# Patient Record
Sex: FEMALE | Race: WHITE | NOT HISPANIC OR LATINO | Employment: OTHER | URBAN - NONMETROPOLITAN AREA
[De-identification: names, ages, dates, MRNs, and addresses within clinical notes are randomized per-mention and may not be internally consistent; named-entity substitution may affect disease eponyms.]

---

## 2018-10-17 ENCOUNTER — TRANSCRIBE ORDERS (OUTPATIENT)
Dept: ADMINISTRATIVE | Facility: HOSPITAL | Age: 53
End: 2018-10-17

## 2018-10-17 ENCOUNTER — LAB REQUISITION (OUTPATIENT)
Dept: LAB | Facility: HOSPITAL | Age: 53
End: 2018-10-17

## 2018-10-17 ENCOUNTER — APPOINTMENT (OUTPATIENT)
Dept: LAB | Facility: HOSPITAL | Age: 53
End: 2018-10-17
Attending: OBSTETRICS & GYNECOLOGY

## 2018-10-17 DIAGNOSIS — Z00.00 ENCOUNTER FOR GENERAL ADULT MEDICAL EXAMINATION WITHOUT ABNORMAL FINDINGS: ICD-10-CM

## 2018-10-17 DIAGNOSIS — Z78.0 POST-MENOPAUSAL: Primary | ICD-10-CM

## 2018-10-17 DIAGNOSIS — N83.209 CYST OF OVARY, UNSPECIFIED LATERALITY: ICD-10-CM

## 2018-10-17 LAB
ESTRADIOL SERPL HS-MCNC: 63.3 PG/ML
FSH SERPL-ACNC: 2.5 MIU/ML
LH SERPL-ACNC: 1.26 MIU/ML

## 2018-10-17 PROCEDURE — 81500 ONCO (OVAR) TWO PROTEINS: CPT | Performed by: OBSTETRICS & GYNECOLOGY

## 2018-10-17 PROCEDURE — 36415 COLL VENOUS BLD VENIPUNCTURE: CPT

## 2018-10-17 PROCEDURE — 82670 ASSAY OF TOTAL ESTRADIOL: CPT | Performed by: OBSTETRICS & GYNECOLOGY

## 2018-10-17 PROCEDURE — 83002 ASSAY OF GONADOTROPIN (LH): CPT | Performed by: OBSTETRICS & GYNECOLOGY

## 2018-10-17 PROCEDURE — 86304 IMMUNOASSAY TUMOR CA 125: CPT | Performed by: OBSTETRICS & GYNECOLOGY

## 2018-10-17 PROCEDURE — 88305 TISSUE EXAM BY PATHOLOGIST: CPT | Performed by: OBSTETRICS & GYNECOLOGY

## 2018-10-17 PROCEDURE — 83001 ASSAY OF GONADOTROPIN (FSH): CPT | Performed by: OBSTETRICS & GYNECOLOGY

## 2018-10-17 PROCEDURE — 86305 HUMAN EPIDIDYMIS PROTEIN 4: CPT | Performed by: OBSTETRICS & GYNECOLOGY

## 2018-10-17 PROCEDURE — 86301 IMMUNOASSAY TUMOR CA 19-9: CPT | Performed by: OBSTETRICS & GYNECOLOGY

## 2018-10-18 LAB
CANCER AG125 SERPL-ACNC: 18.4 U/ML (ref 0–38.1)
CANCER AG19-9 SERPL-ACNC: 28 U/ML (ref 0–35)
CONV COMMENT: ABNORMAL
HE4 SERPL-SCNC: 62.5 PMOL/L (ref 0–105.2)
POSTMENOPAUSAL INTERP: LOW: NORMAL
POSTMENOPAUSAL ROMA: 1.6
PREMENOPAUSAL INTERP: HIGH: NORMAL
PREMENOPAUSAL ROMA: 1.22

## 2018-10-19 LAB
CYTO UR: NORMAL
LAB AP CASE REPORT: NORMAL
LAB AP CLINICAL INFORMATION: NORMAL
PATH REPORT.FINAL DX SPEC: NORMAL
PATH REPORT.GROSS SPEC: NORMAL

## 2018-10-22 ENCOUNTER — TRANSCRIBE ORDERS (OUTPATIENT)
Dept: ADMINISTRATIVE | Facility: HOSPITAL | Age: 53
End: 2018-10-22

## 2018-10-22 DIAGNOSIS — C56.2 MALIGNANT NEOPLASM OF LEFT OVARY (HCC): Primary | ICD-10-CM

## 2018-10-24 ENCOUNTER — HOSPITAL ENCOUNTER (OUTPATIENT)
Dept: ULTRASOUND IMAGING | Facility: HOSPITAL | Age: 53
Discharge: HOME | End: 2018-10-24
Attending: OBSTETRICS & GYNECOLOGY | Admitting: OBSTETRICS & GYNECOLOGY

## 2018-10-24 DIAGNOSIS — C56.2 MALIGNANT NEOPLASM OF LEFT OVARY (HCC): ICD-10-CM

## 2018-10-24 PROCEDURE — 76830 TRANSVAGINAL US NON-OB: CPT

## 2019-06-23 ENCOUNTER — HOSPITAL ENCOUNTER (OUTPATIENT)
Facility: HOSPITAL | Age: 54
Setting detail: OBSERVATION
Discharge: HOME | End: 2019-06-24
Attending: INTERNAL MEDICINE | Admitting: INTERNAL MEDICINE

## 2019-06-23 ENCOUNTER — APPOINTMENT (OUTPATIENT)
Dept: GENERAL RADIOLOGY | Facility: HOSPITAL | Age: 54
End: 2019-06-23

## 2019-06-23 DIAGNOSIS — R07.9 CHEST PAIN, UNSPECIFIED TYPE: Primary | ICD-10-CM

## 2019-06-23 DIAGNOSIS — E87.6 HYPOKALEMIA: ICD-10-CM

## 2019-06-23 DIAGNOSIS — D72.829 LEUKOCYTOSIS, UNSPECIFIED TYPE: ICD-10-CM

## 2019-06-23 LAB
ALBUMIN SERPL-MCNC: 4.5 G/DL (ref 3.5–5)
ALBUMIN/GLOB SERPL: 1.3 G/DL (ref 1.1–2.5)
ALP SERPL-CCNC: 108 U/L (ref 24–120)
ALT SERPL W P-5'-P-CCNC: 28 U/L (ref 0–54)
ANION GAP SERPL CALCULATED.3IONS-SCNC: 14 MMOL/L (ref 4–13)
AST SERPL-CCNC: 30 U/L (ref 7–45)
BASOPHILS # BLD MANUAL: 0.1 10*3/MM3 (ref 0–0.2)
BASOPHILS NFR BLD AUTO: 0.8 % (ref 0–2)
BILIRUB SERPL-MCNC: 1 MG/DL (ref 0.1–1)
BUN BLD-MCNC: 9 MG/DL (ref 5–21)
BUN/CREAT SERPL: 13 (ref 7–25)
CALCIUM SPEC-SCNC: 9.7 MG/DL (ref 8.4–10.4)
CHLORIDE SERPL-SCNC: 98 MMOL/L (ref 98–110)
CO2 SERPL-SCNC: 30 MMOL/L (ref 24–31)
CREAT BLD-MCNC: 0.69 MG/DL (ref 0.5–1.4)
DEPRECATED RDW RBC AUTO: 40.3 FL (ref 40–54)
EOSINOPHIL # BLD MANUAL: 0.2 10*3/MM3 (ref 0–0.7)
EOSINOPHIL NFR BLD MANUAL: 1.6 % (ref 0–4)
ERYTHROCYTE [DISTWIDTH] IN BLOOD BY AUTOMATED COUNT: 13.3 % (ref 12–15)
GFR SERPL CREATININE-BSD FRML MDRD: 89 ML/MIN/1.73
GLOBULIN UR ELPH-MCNC: 3.5 GM/DL
GLUCOSE BLD-MCNC: 126 MG/DL (ref 70–100)
HCT VFR BLD AUTO: 37.8 % (ref 37–47)
HGB BLD-MCNC: 12.8 G/DL (ref 12–16)
HOLD SPECIMEN: NORMAL
HOLD SPECIMEN: NORMAL
LYMPHOCYTES # BLD MANUAL: 4.01 10*3/MM3 (ref 0.72–4.86)
LYMPHOCYTES NFR BLD MANUAL: 32.8 % (ref 15–45)
LYMPHOCYTES NFR BLD MANUAL: 6.4 % (ref 4–12)
MAGNESIUM SERPL-MCNC: 1.7 MG/DL (ref 1.4–2.2)
MCH RBC QN AUTO: 28.4 PG (ref 28–32)
MCHC RBC AUTO-ENTMCNC: 33.9 G/DL (ref 33–36)
MCV RBC AUTO: 84 FL (ref 82–98)
MONOCYTES # BLD AUTO: 0.78 10*3/MM3 (ref 0.19–1.3)
NEUTROPHILS # BLD AUTO: 6.95 10*3/MM3 (ref 1.87–8.4)
NEUTROPHILS NFR BLD MANUAL: 56.8 % (ref 39–78)
NT-PROBNP SERPL-MCNC: 17.4 PG/ML (ref 0–900)
PLAT MORPH BLD: NORMAL
PLATELET # BLD AUTO: 372 10*3/MM3 (ref 130–400)
PMV BLD AUTO: 10.5 FL (ref 6–12)
POTASSIUM BLD-SCNC: 3.4 MMOL/L (ref 3.5–5.3)
PROT SERPL-MCNC: 8 G/DL (ref 6.3–8.7)
RBC # BLD AUTO: 4.5 10*6/MM3 (ref 4.2–5.4)
RBC MORPH BLD: NORMAL
SODIUM BLD-SCNC: 142 MMOL/L (ref 135–145)
TROPONIN I SERPL-MCNC: <0.012 NG/ML (ref 0–0.03)
VARIANT LYMPHS NFR BLD MANUAL: 1.6 % (ref 0–5)
WBC MORPH BLD: NORMAL
WBC NRBC COR # BLD: 12.24 10*3/MM3 (ref 4.8–10.8)
WHOLE BLOOD HOLD SPECIMEN: NORMAL
WHOLE BLOOD HOLD SPECIMEN: NORMAL

## 2019-06-23 PROCEDURE — 84443 ASSAY THYROID STIM HORMONE: CPT | Performed by: INTERNAL MEDICINE

## 2019-06-23 PROCEDURE — 93005 ELECTROCARDIOGRAM TRACING: CPT | Performed by: EMERGENCY MEDICINE

## 2019-06-23 PROCEDURE — 93005 ELECTROCARDIOGRAM TRACING: CPT | Performed by: INTERNAL MEDICINE

## 2019-06-23 PROCEDURE — 85025 COMPLETE CBC W/AUTO DIFF WBC: CPT | Performed by: PHYSICIAN ASSISTANT

## 2019-06-23 PROCEDURE — 93010 ELECTROCARDIOGRAM REPORT: CPT | Performed by: INTERNAL MEDICINE

## 2019-06-23 PROCEDURE — G0378 HOSPITAL OBSERVATION PER HR: HCPCS

## 2019-06-23 PROCEDURE — 84484 ASSAY OF TROPONIN QUANT: CPT | Performed by: EMERGENCY MEDICINE

## 2019-06-23 PROCEDURE — 85007 BL SMEAR W/DIFF WBC COUNT: CPT | Performed by: PHYSICIAN ASSISTANT

## 2019-06-23 PROCEDURE — 83880 ASSAY OF NATRIURETIC PEPTIDE: CPT | Performed by: PHYSICIAN ASSISTANT

## 2019-06-23 PROCEDURE — 83735 ASSAY OF MAGNESIUM: CPT | Performed by: PHYSICIAN ASSISTANT

## 2019-06-23 PROCEDURE — 99284 EMERGENCY DEPT VISIT MOD MDM: CPT

## 2019-06-23 PROCEDURE — 71046 X-RAY EXAM CHEST 2 VIEWS: CPT

## 2019-06-23 PROCEDURE — 80053 COMPREHEN METABOLIC PANEL: CPT | Performed by: PHYSICIAN ASSISTANT

## 2019-06-23 PROCEDURE — 84439 ASSAY OF FREE THYROXINE: CPT | Performed by: INTERNAL MEDICINE

## 2019-06-23 RX ORDER — CARVEDILOL 3.12 MG/1
3.12 TABLET ORAL 2 TIMES DAILY WITH MEALS
COMMUNITY
End: 2023-01-11 | Stop reason: SDUPTHER

## 2019-06-23 RX ORDER — POTASSIUM CHLORIDE 750 MG/1
20 CAPSULE, EXTENDED RELEASE ORAL DAILY
Status: DISCONTINUED | OUTPATIENT
Start: 2019-06-23 | End: 2019-06-24 | Stop reason: HOSPADM

## 2019-06-23 RX ORDER — LEVOTHYROXINE SODIUM 175 UG/1
175 TABLET ORAL DAILY
COMMUNITY
End: 2022-09-30 | Stop reason: CLARIF

## 2019-06-23 RX ORDER — FUROSEMIDE 40 MG/1
40 TABLET ORAL DAILY
COMMUNITY

## 2019-06-23 RX ORDER — ACETAMINOPHEN 500 MG
1000 TABLET ORAL ONCE
Status: COMPLETED | OUTPATIENT
Start: 2019-06-23 | End: 2019-06-23

## 2019-06-23 RX ORDER — BUTALBITAL, ACETAMINOPHEN AND CAFFEINE 50; 325; 40 MG/1; MG/1; MG/1
1 TABLET ORAL EVERY 4 HOURS PRN
COMMUNITY
End: 2020-05-26

## 2019-06-23 RX ORDER — ALBUTEROL SULFATE 90 UG/1
2 AEROSOL, METERED RESPIRATORY (INHALATION) EVERY 4 HOURS PRN
COMMUNITY
End: 2023-01-09 | Stop reason: SDUPTHER

## 2019-06-23 RX ORDER — LISINOPRIL AND HYDROCHLOROTHIAZIDE 20; 12.5 MG/1; MG/1
1 TABLET ORAL DAILY
COMMUNITY
End: 2023-01-11 | Stop reason: SDUPTHER

## 2019-06-23 RX ORDER — ATORVASTATIN CALCIUM 20 MG/1
20 TABLET, FILM COATED ORAL DAILY
COMMUNITY
End: 2023-01-11 | Stop reason: SDUPTHER

## 2019-06-23 RX ORDER — TRAZODONE HYDROCHLORIDE 50 MG/1
50 TABLET ORAL NIGHTLY
COMMUNITY
End: 2020-05-26

## 2019-06-23 RX ORDER — CETIRIZINE HYDROCHLORIDE 10 MG/1
10 TABLET ORAL DAILY
COMMUNITY
End: 2022-09-30 | Stop reason: SDUPTHER

## 2019-06-23 RX ORDER — POTASSIUM CHLORIDE 750 MG/1
20 CAPSULE, EXTENDED RELEASE ORAL DAILY
Status: DISCONTINUED | OUTPATIENT
Start: 2019-06-24 | End: 2019-06-23 | Stop reason: SDUPTHER

## 2019-06-23 RX ADMIN — ACETAMINOPHEN 1000 MG: 500 TABLET, FILM COATED ORAL at 22:02

## 2019-06-23 RX ADMIN — POTASSIUM CHLORIDE 20 MEQ: 750 CAPSULE, EXTENDED RELEASE ORAL at 23:18

## 2019-06-24 ENCOUNTER — APPOINTMENT (OUTPATIENT)
Dept: CARDIOLOGY | Facility: HOSPITAL | Age: 54
End: 2019-06-24

## 2019-06-24 VITALS
TEMPERATURE: 97.4 F | OXYGEN SATURATION: 94 % | DIASTOLIC BLOOD PRESSURE: 62 MMHG | BODY MASS INDEX: 39.68 KG/M2 | HEIGHT: 72 IN | HEART RATE: 90 BPM | RESPIRATION RATE: 20 BRPM | WEIGHT: 293 LBS | SYSTOLIC BLOOD PRESSURE: 114 MMHG

## 2019-06-24 PROBLEM — E78.5 HYPERLIPIDEMIA: Chronic | Status: ACTIVE | Noted: 2019-06-24

## 2019-06-24 PROBLEM — I10 HYPERTENSION: Chronic | Status: ACTIVE | Noted: 2019-06-24

## 2019-06-24 PROBLEM — E66.01 OBESITY, CLASS III, BMI 40-49.9 (MORBID OBESITY): Status: ACTIVE | Noted: 2019-06-24

## 2019-06-24 PROBLEM — E11.9 DIABETES MELLITUS (HCC): Chronic | Status: ACTIVE | Noted: 2019-06-24

## 2019-06-24 PROBLEM — J44.9 COPD (CHRONIC OBSTRUCTIVE PULMONARY DISEASE): Chronic | Status: ACTIVE | Noted: 2019-06-24

## 2019-06-24 PROBLEM — G43.011 INTRACTABLE MIGRAINE WITHOUT AURA AND WITH STATUS MIGRAINOSUS: Chronic | Status: ACTIVE | Noted: 2019-06-24

## 2019-06-24 PROBLEM — G43.909 MIGRAINE: Status: ACTIVE | Noted: 2019-06-24

## 2019-06-24 LAB
ANION GAP SERPL CALCULATED.3IONS-SCNC: 11 MMOL/L (ref 4–13)
ARTICHOKE IGE QN: 98 MG/DL (ref 0–99)
BH CV STRESS BP STAGE 1: NORMAL
BH CV STRESS BP STAGE 2: NORMAL
BH CV STRESS BP STAGE 3: NORMAL
BH CV STRESS DOB - ATROPINE STAGE 3: 0.3
BH CV STRESS DOSE DOBUTAMINE STAGE 1: 10
BH CV STRESS DOSE DOBUTAMINE STAGE 2: 20
BH CV STRESS DOSE DOBUTAMINE STAGE 3: 30
BH CV STRESS DURATION MIN STAGE 1: 3
BH CV STRESS DURATION MIN STAGE 2: 3
BH CV STRESS DURATION MIN STAGE 3: 3
BH CV STRESS DURATION SEC STAGE 1: 0
BH CV STRESS DURATION SEC STAGE 2: 0
BH CV STRESS DURATION SEC STAGE 3: 23
BH CV STRESS HR STAGE 1: 63
BH CV STRESS HR STAGE 2: 139
BH CV STRESS HR STAGE 3: 153
BH CV STRESS PROTOCOL 1: NORMAL
BH CV STRESS RECOVERY BP: NORMAL MMHG
BH CV STRESS RECOVERY HR: 101 BPM
BH CV STRESS STAGE 1: 1
BH CV STRESS STAGE 2: 2
BH CV STRESS STAGE 3: 3
BUN BLD-MCNC: 11 MG/DL (ref 5–21)
BUN/CREAT SERPL: 16.9 (ref 7–25)
CALCIUM SPEC-SCNC: 9.6 MG/DL (ref 8.4–10.4)
CHLORIDE SERPL-SCNC: 99 MMOL/L (ref 98–110)
CHOLEST SERPL-MCNC: 153 MG/DL (ref 130–200)
CO2 SERPL-SCNC: 29 MMOL/L (ref 24–31)
CREAT BLD-MCNC: 0.65 MG/DL (ref 0.5–1.4)
GFR SERPL CREATININE-BSD FRML MDRD: 95 ML/MIN/1.73
GLUCOSE BLD-MCNC: 104 MG/DL (ref 70–100)
GLUCOSE BLDC GLUCOMTR-MCNC: 121 MG/DL (ref 70–130)
HBA1C MFR BLD: 6 %
HDLC SERPL-MCNC: 32 MG/DL
LDLC/HDLC SERPL: 2.73 {RATIO}
MAXIMAL PREDICTED HEART RATE: 166 BPM
PERCENT MAX PREDICTED HR: 92.17 %
POTASSIUM BLD-SCNC: 3.9 MMOL/L (ref 3.5–5.3)
SODIUM BLD-SCNC: 139 MMOL/L (ref 135–145)
STRESS BASELINE BP: NORMAL MMHG
STRESS BASELINE HR: 76 BPM
STRESS PERCENT HR: 108 %
STRESS POST EXERCISE DUR MIN: 9 MIN
STRESS POST EXERCISE DUR SEC: 23 SEC
STRESS POST PEAK BP: NORMAL MMHG
STRESS POST PEAK HR: 153 BPM
STRESS TARGET HR: 141 BPM
T4 FREE SERPL-MCNC: 1.78 NG/DL (ref 0.78–2.19)
TRIGL SERPL-MCNC: 168 MG/DL (ref 0–149)
TROPONIN I SERPL-MCNC: <0.012 NG/ML (ref 0–0.03)
TROPONIN I SERPL-MCNC: <0.012 NG/ML (ref 0–0.03)
TSH SERPL DL<=0.05 MIU/L-ACNC: <0.02 MIU/ML (ref 0.47–4.68)

## 2019-06-24 PROCEDURE — 96361 HYDRATE IV INFUSION ADD-ON: CPT

## 2019-06-24 PROCEDURE — 83036 HEMOGLOBIN GLYCOSYLATED A1C: CPT | Performed by: INTERNAL MEDICINE

## 2019-06-24 PROCEDURE — 93018 CV STRESS TEST I&R ONLY: CPT | Performed by: INTERNAL MEDICINE

## 2019-06-24 PROCEDURE — 80048 BASIC METABOLIC PNL TOTAL CA: CPT | Performed by: INTERNAL MEDICINE

## 2019-06-24 PROCEDURE — 25010000002 PERFLUTREN 6.52 MG/ML SUSPENSION: Performed by: INTERNAL MEDICINE

## 2019-06-24 PROCEDURE — 80061 LIPID PANEL: CPT | Performed by: INTERNAL MEDICINE

## 2019-06-24 PROCEDURE — 93350 STRESS TTE ONLY: CPT | Performed by: INTERNAL MEDICINE

## 2019-06-24 PROCEDURE — 96372 THER/PROPH/DIAG INJ SC/IM: CPT

## 2019-06-24 PROCEDURE — 84484 ASSAY OF TROPONIN QUANT: CPT | Performed by: INTERNAL MEDICINE

## 2019-06-24 PROCEDURE — G0378 HOSPITAL OBSERVATION PER HR: HCPCS

## 2019-06-24 PROCEDURE — 93017 CV STRESS TEST TRACING ONLY: CPT

## 2019-06-24 PROCEDURE — 25010000003 DOBUTAMINE PER 250 MG: Performed by: INTERNAL MEDICINE

## 2019-06-24 PROCEDURE — 93010 ELECTROCARDIOGRAM REPORT: CPT | Performed by: INTERNAL MEDICINE

## 2019-06-24 PROCEDURE — 93005 ELECTROCARDIOGRAM TRACING: CPT | Performed by: INTERNAL MEDICINE

## 2019-06-24 PROCEDURE — 82962 GLUCOSE BLOOD TEST: CPT

## 2019-06-24 PROCEDURE — 25810000003 SODIUM CHLORIDE 0.9 % WITH KCL 20 MEQ 20-0.9 MEQ/L-% SOLUTION: Performed by: INTERNAL MEDICINE

## 2019-06-24 PROCEDURE — 93352 ADMIN ECG CONTRAST AGENT: CPT | Performed by: INTERNAL MEDICINE

## 2019-06-24 PROCEDURE — 96360 HYDRATION IV INFUSION INIT: CPT

## 2019-06-24 PROCEDURE — 93350 STRESS TTE ONLY: CPT

## 2019-06-24 PROCEDURE — 25010000002 ENOXAPARIN PER 10 MG: Performed by: INTERNAL MEDICINE

## 2019-06-24 RX ORDER — ONDANSETRON 4 MG/1
4 TABLET, FILM COATED ORAL EVERY 6 HOURS PRN
Status: DISCONTINUED | OUTPATIENT
Start: 2019-06-24 | End: 2019-06-24 | Stop reason: HOSPADM

## 2019-06-24 RX ORDER — ATORVASTATIN CALCIUM 10 MG/1
20 TABLET, FILM COATED ORAL DAILY
Status: DISCONTINUED | OUTPATIENT
Start: 2019-06-24 | End: 2019-06-24 | Stop reason: HOSPADM

## 2019-06-24 RX ORDER — CHOLECALCIFEROL (VITAMIN D3) 125 MCG
500 CAPSULE ORAL DAILY
COMMUNITY
End: 2022-09-30

## 2019-06-24 RX ORDER — ONDANSETRON 2 MG/ML
4 INJECTION INTRAMUSCULAR; INTRAVENOUS EVERY 6 HOURS PRN
Status: DISCONTINUED | OUTPATIENT
Start: 2019-06-24 | End: 2019-06-24 | Stop reason: HOSPADM

## 2019-06-24 RX ORDER — NICOTINE POLACRILEX 4 MG
15 LOZENGE BUCCAL
Status: DISCONTINUED | OUTPATIENT
Start: 2019-06-24 | End: 2019-06-24 | Stop reason: HOSPADM

## 2019-06-24 RX ORDER — ASPIRIN 81 MG/1
81 TABLET ORAL DAILY
Status: DISCONTINUED | OUTPATIENT
Start: 2019-06-25 | End: 2019-06-24 | Stop reason: HOSPADM

## 2019-06-24 RX ORDER — MELATONIN
2000 DAILY
COMMUNITY
End: 2023-01-11 | Stop reason: SDUPTHER

## 2019-06-24 RX ORDER — LEVOTHYROXINE SODIUM 175 UG/1
175 TABLET ORAL
Status: DISCONTINUED | OUTPATIENT
Start: 2019-06-24 | End: 2019-06-24

## 2019-06-24 RX ORDER — SODIUM CHLORIDE AND POTASSIUM CHLORIDE 150; 900 MG/100ML; MG/100ML
100 INJECTION, SOLUTION INTRAVENOUS CONTINUOUS
Status: DISCONTINUED | OUTPATIENT
Start: 2019-06-24 | End: 2019-06-24 | Stop reason: HOSPADM

## 2019-06-24 RX ORDER — ASCORBIC ACID 500 MG
500 TABLET ORAL DAILY
COMMUNITY
End: 2022-09-30

## 2019-06-24 RX ORDER — BUTALBITAL, ACETAMINOPHEN AND CAFFEINE 50; 325; 40 MG/1; MG/1; MG/1
1 TABLET ORAL EVERY 4 HOURS PRN
Status: DISCONTINUED | OUTPATIENT
Start: 2019-06-24 | End: 2019-06-24 | Stop reason: HOSPADM

## 2019-06-24 RX ORDER — ALBUTEROL SULFATE 2.5 MG/3ML
2.5 SOLUTION RESPIRATORY (INHALATION) EVERY 4 HOURS PRN
Status: DISCONTINUED | OUTPATIENT
Start: 2019-06-24 | End: 2019-06-24 | Stop reason: HOSPADM

## 2019-06-24 RX ORDER — FLUTICASONE PROPIONATE 50 MCG
2 SPRAY, SUSPENSION (ML) NASAL DAILY
COMMUNITY
End: 2023-03-27 | Stop reason: SDUPTHER

## 2019-06-24 RX ORDER — SODIUM CHLORIDE 0.9 % (FLUSH) 0.9 %
3 SYRINGE (ML) INJECTION EVERY 12 HOURS SCHEDULED
Status: DISCONTINUED | OUTPATIENT
Start: 2019-06-24 | End: 2019-06-24 | Stop reason: HOSPADM

## 2019-06-24 RX ORDER — MELATONIN
2000 DAILY
Status: DISCONTINUED | OUTPATIENT
Start: 2019-06-24 | End: 2019-06-24 | Stop reason: HOSPADM

## 2019-06-24 RX ORDER — NITROGLYCERIN 20 MG/100ML
10-50 INJECTION INTRAVENOUS
Status: DISCONTINUED | OUTPATIENT
Start: 2019-06-24 | End: 2019-06-24

## 2019-06-24 RX ORDER — SODIUM CHLORIDE 0.9 % (FLUSH) 0.9 %
3-10 SYRINGE (ML) INJECTION AS NEEDED
Status: DISCONTINUED | OUTPATIENT
Start: 2019-06-24 | End: 2019-06-24 | Stop reason: HOSPADM

## 2019-06-24 RX ORDER — TRAZODONE HYDROCHLORIDE 50 MG/1
50 TABLET ORAL NIGHTLY
Status: DISCONTINUED | OUTPATIENT
Start: 2019-06-24 | End: 2019-06-24 | Stop reason: HOSPADM

## 2019-06-24 RX ORDER — CROMOLYN SODIUM 40 MG/ML
1 SOLUTION/ DROPS OPHTHALMIC DAILY
COMMUNITY

## 2019-06-24 RX ORDER — DEXTROSE MONOHYDRATE 25 G/50ML
25 INJECTION, SOLUTION INTRAVENOUS
Status: DISCONTINUED | OUTPATIENT
Start: 2019-06-24 | End: 2019-06-24 | Stop reason: HOSPADM

## 2019-06-24 RX ORDER — DOBUTAMINE HYDROCHLORIDE 100 MG/100ML
10-50 INJECTION INTRAVENOUS CONTINUOUS
Status: DISCONTINUED | OUTPATIENT
Start: 2019-06-24 | End: 2019-06-24 | Stop reason: HOSPADM

## 2019-06-24 RX ORDER — CETIRIZINE HYDROCHLORIDE 10 MG/1
10 TABLET ORAL DAILY
Status: DISCONTINUED | OUTPATIENT
Start: 2019-06-24 | End: 2019-06-24 | Stop reason: HOSPADM

## 2019-06-24 RX ORDER — LEVOTHYROXINE SODIUM 0.15 MG/1
150 TABLET ORAL
Status: DISCONTINUED | OUTPATIENT
Start: 2019-06-25 | End: 2019-06-24 | Stop reason: HOSPADM

## 2019-06-24 RX ORDER — FUROSEMIDE 40 MG/1
40 TABLET ORAL DAILY
Status: DISCONTINUED | OUTPATIENT
Start: 2019-06-24 | End: 2019-06-24 | Stop reason: HOSPADM

## 2019-06-24 RX ORDER — POTASSIUM CHLORIDE 750 MG/1
40 CAPSULE, EXTENDED RELEASE ORAL ONCE
Status: COMPLETED | OUTPATIENT
Start: 2019-06-24 | End: 2019-06-24

## 2019-06-24 RX ORDER — ASPIRIN 81 MG/1
81 TABLET, CHEWABLE ORAL ONCE
Status: COMPLETED | OUTPATIENT
Start: 2019-06-24 | End: 2019-06-24

## 2019-06-24 RX ORDER — CARVEDILOL 3.12 MG/1
3.12 TABLET ORAL 2 TIMES DAILY WITH MEALS
Status: DISCONTINUED | OUTPATIENT
Start: 2019-06-24 | End: 2019-06-24 | Stop reason: HOSPADM

## 2019-06-24 RX ADMIN — SODIUM CHLORIDE, PRESERVATIVE FREE 3 ML: 5 INJECTION INTRAVENOUS at 01:36

## 2019-06-24 RX ADMIN — CETIRIZINE HYDROCHLORIDE 10 MG: 10 TABLET, FILM COATED ORAL at 10:00

## 2019-06-24 RX ADMIN — Medication 10 MCG/KG/MIN: at 14:47

## 2019-06-24 RX ADMIN — LEVOTHYROXINE SODIUM 175 MCG: 175 TABLET ORAL at 05:30

## 2019-06-24 RX ADMIN — LISINOPRIL: 10 TABLET ORAL at 10:01

## 2019-06-24 RX ADMIN — PERFLUTREN 8.48 MG: 6.52 INJECTION, SUSPENSION INTRAVENOUS at 14:46

## 2019-06-24 RX ADMIN — ATROPINE SULFATE 0.3 MG: 0.1 INJECTION PARENTERAL at 15:13

## 2019-06-24 RX ADMIN — CARVEDILOL 3.12 MG: 3.12 TABLET, FILM COATED ORAL at 10:00

## 2019-06-24 RX ADMIN — FUROSEMIDE 40 MG: 40 TABLET ORAL at 10:00

## 2019-06-24 RX ADMIN — ASPIRIN 81 MG 81 MG: 81 TABLET ORAL at 01:42

## 2019-06-24 RX ADMIN — CHOLECALCIFEROL (VITAMIN D3) 25 MCG (1,000 UNIT) TABLET 2000 UNITS: TABLET at 10:00

## 2019-06-24 RX ADMIN — POTASSIUM CHLORIDE 40 MEQ: 750 CAPSULE, EXTENDED RELEASE ORAL at 01:45

## 2019-06-24 RX ADMIN — ENOXAPARIN SODIUM 40 MG: 40 INJECTION SUBCUTANEOUS at 09:58

## 2019-06-24 RX ADMIN — TRAZODONE HYDROCHLORIDE 50 MG: 50 TABLET ORAL at 01:38

## 2019-06-24 RX ADMIN — SODIUM CHLORIDE, PRESERVATIVE FREE 3 ML: 5 INJECTION INTRAVENOUS at 09:59

## 2019-06-24 RX ADMIN — ATORVASTATIN CALCIUM 20 MG: 10 TABLET, FILM COATED ORAL at 10:01

## 2019-06-24 RX ADMIN — POTASSIUM CHLORIDE AND SODIUM CHLORIDE 100 ML/HR: 900; 150 INJECTION, SOLUTION INTRAVENOUS at 01:36

## 2019-06-24 NOTE — DISCHARGE SUMMARY
[facility] Medicine Services  DISCHARGE SUMMARY       Date of Admission: 6/23/2019  Date of Discharge:  6/24/2019  Primary Care Physician: Louisa Meneses APRN    Presenting Problem/History of Present Illness:  Chest pain, unspecified type [R07.9]     Final Discharge Diagnoses:  Active Hospital Problems    Diagnosis   • Diabetes mellitus (CMS/[facility])   • Hyperlipidemia   • Hypertension   • COPD (chronic obstructive pulmonary disease) (CMS/[facility])   • Migraine   • Obesity, Class III, BMI 40-49.9 (morbid obesity) (CMS/[facility])   • Chest pain     Cardiac workup negative         Procedures Performed:   Imaging Results (last 7 days)     Procedure Component Value Units Date/Time    XR Chest 2 View [[id number]] Collected:  06/24/19 0659     Updated:  06/24/19 0704    Narrative:       EXAMINATION: Chest 2 views 06/23/2019     HISTORY: Chest pain.     FINDINGS: Upright frontal and lateral projection of the chest  demonstrate no evidence of acute cardiopulmonary disease. There is no  effusion or free air present. There is spondylosis the mid and lower  thoracic spine. Calcific tendinosis is noted of the supraspinatus tendon  of the right shoulder.       Impression:       . No acute disease.  This report was finalized on 06/24/2019 07:01 by Dr. Andrei Regalado MD.        Interpretation Summary of stress testing    · Low risk for ischemia.  · All left ventricular wall segments demonstrate appropriate increase in contractility with dobutamine infusion.          Pertinent Test Results:   Lab Results (last 48 hours)     Procedure Component Value Units Date/Time    Troponin [[id number]]  (Normal) Collected:  06/24/19 0907    Specimen:  Blood Updated:  06/24/19 0940     Troponin I <0.012 ng/mL     Hemoglobin A1c [[id number]] Collected:  06/24/19 0328    Specimen:  Blood Updated:  06/24/19 0822     Hemoglobin A1C 6.0 %     Narrative:       Less than 6.0           Non-Diabetic Range  6.0-7.0                  ADA Therapeutic Target  Greater than 7.0        Action Suggested    Troponin [[id number]]  (Normal) Collected:  06/24/19 0328    Specimen:  Blood Updated:  06/24/19 0459     Troponin I <0.012 ng/mL     Lipid Panel [[id number]]  (Abnormal) Collected:  06/24/19 0328    Specimen:  Blood Updated:  06/24/19 0458     Total Cholesterol 153 mg/dL      Triglycerides 168 mg/dL      HDL Cholesterol 32 mg/dL      LDL Cholesterol  98 mg/dL      LDL/HDL Ratio 2.73    Basic Metabolic Panel [[id number]]  (Abnormal) Collected:  06/24/19 0328    Specimen:  Blood Updated:  06/24/19 0446     Glucose 104 mg/dL      BUN 11 mg/dL      Creatinine 0.65 mg/dL      Sodium 139 mmol/L      Potassium 3.9 mmol/L      Chloride 99 mmol/L      CO2 29.0 mmol/L      Calcium 9.6 mg/dL      eGFR Non African Amer 95 mL/min/1.73      BUN/Creatinine Ratio 16.9     Anion Gap 11.0 mmol/L     Narrative:       GFR Normal >60  Chronic Kidney Disease <60  Kidney Failure <15    POC Glucose Once [[id number]]  (Normal) Collected:  06/24/19 0201    Specimen:  Blood Updated:  06/24/19 0215     Glucose 121 mg/dL      Comment: : [id number] Jere LisaMeter ID: [id number]       TSH [[id number]]  (Abnormal) Collected:  06/23/19 2126    Specimen:  Blood Updated:  06/24/19 0141     TSH <0.020 mIU/mL     T4, Free [[id number]]  (Normal) Collected:  06/23/19 2126    Specimen:  Blood Updated:  06/24/19 0127     Free T4 1.78 ng/dL     [place] Draw [[id number]] Collected:  06/23/19 2126    Specimen:  Blood Updated:  06/23/19 2230    Narrative:       The following orders were created for panel order [place] Draw.  Procedure                               Abnormality         Status                     ---------                               -----------         ------                     Light Blue Top[[id number]]                                   Final result               Green Top (Gel)[[id number]]                                  Final result               Lavender Top[[id number]]                                      Final result               Red Top[[id number]]                                          Final result                 Please view results for these tests on the individual orders.    Light Blue Top [[id number]] Collected:  06/23/19 2126    Specimen:  Blood Updated:  06/23/19 2230     Extra Tube hold for add-on     Comment: Auto resulted       Green Top (Gel) [[id number]] Collected:  06/23/19 2126    Specimen:  Blood Updated:  06/23/19 2230     Extra Tube Hold for add-ons.     Comment: Auto resulted.       Lavender Top [[id number]] Collected:  06/23/19 2126    Specimen:  Blood Updated:  06/23/19 2230     Extra Tube hold for add-on     Comment: Auto resulted       Red Top [[id number]] Collected:  06/23/19 2126    Specimen:  Blood Updated:  06/23/19 2230     Extra Tube Hold for add-ons.     Comment: Auto resulted.       CBC & Differential [[id number]] Collected:  06/23/19 2126    Specimen:  Blood Updated:  06/23/19 2228    Narrative:       The following orders were created for panel order CBC & Differential.  Procedure                               Abnormality         Status                     ---------                               -----------         ------                     CBC Auto Differential[[id number]]        Abnormal            Final result                 Please view results for these tests on the individual orders.    CBC Auto Differential [[id number]]  (Abnormal) Collected:  06/23/19 2126    Specimen:  Blood Updated:  06/23/19 2228     WBC 12.24 10*3/mm3      RBC 4.50 10*6/mm3      Hemoglobin 12.8 g/dL      Hematocrit 37.8 %      MCV 84.0 fL      MCH 28.4 pg      MCHC 33.9 g/dL      RDW 13.3 %      RDW-SD 40.3 fl      MPV 10.5 fL      Platelets 372 10*3/mm3     Manual Differential [[id number]]  (Normal) Collected:  06/23/19 2126    Specimen:  Blood Updated:  06/23/19 2228     Neutrophil % 56.8 %      Lymphocyte % 32.8 %      Monocyte % 6.4 %      Eosinophil % 1.6 %      Basophil %  0.8 %      Atypical Lymphocyte % 1.6 %      Neutrophils Absolute 6.95 10*3/mm3      Lymphocytes Absolute 4.01 10*3/mm3      Monocytes Absolute 0.78 10*3/mm3      Eosinophils Absolute 0.20 10*3/mm3      Basophils Absolute 0.10 10*3/mm3      RBC Morphology Normal     WBC Morphology Normal     Platelet Morphology Normal    Magnesium [[id number]]  (Normal) Collected:  06/23/19 2126    Specimen:  Blood Updated:  06/23/19 2223     Magnesium 1.7 mg/dL     BNP [[id number]]  (Normal) Collected:  06/23/19 2126    Specimen:  Blood Updated:  06/23/19 2216     proBNP 17.4 pg/mL     Comprehensive Metabolic Panel [[id number]]  (Abnormal) Collected:  06/23/19 2126    Specimen:  Blood Updated:  06/23/19 2212     Glucose 126 mg/dL      BUN 9 mg/dL      Creatinine 0.69 mg/dL      Sodium 142 mmol/L      Potassium 3.4 mmol/L      Chloride 98 mmol/L      CO2 30.0 mmol/L      Calcium 9.7 mg/dL      Total Protein 8.0 g/dL      Albumin 4.50 g/dL      ALT (SGPT) 28 U/L      AST (SGOT) 30 U/L      Alkaline Phosphatase 108 U/L      Total Bilirubin 1.0 mg/dL      eGFR Non African Amer 89 mL/min/1.73      Globulin 3.5 gm/dL      A/G Ratio 1.3 g/dL      BUN/Creatinine Ratio 13.0     Anion Gap 14.0 mmol/L     Narrative:       GFR Normal >60  Chronic Kidney Disease <60  Kidney Failure <15    Troponin [[id number]]  (Normal) Collected:  06/23/19 2126    Specimen:  Blood Updated:  06/23/19 2156     Troponin I <0.012 ng/mL           Chief Complaint on Day of Discharge: No current chest pain    Hospital Course:  The patient is a 53 y.o. female who presented to [facility] with a chief complaint of chest pain.  Patient reports that she recently had an echocardiogram performed on an outpatient basis by a Cardiologist out of [place] named Dr. Hahn.  This was performed giving some ongoing symptoms that she has been experiencing including shortness of breath and occasional dyspnea on exertion.  We were able to get a faxed report of her  echocardiogram results from this provider's office, revealing preserved ejection fractions, and no other acute LV or RV abnormalities were seen; no valvular abnormalities were identified as well.    Patient was placed in observation, cardiac enzymes were obtained which were negative, patient was maintained on continuous telemetry, and EKGs were also performed.  No acute findings were noted on EKG.  Patient was taken for dobutamine stress echocardiogram today, and the results of the study were read as low risk for ischemia with no evidence of any wall motion abnormalities.    I would like patient to follow-up closely with her primary care provider within the next week.  She is experiencing no chest pain at this time, and we discussed that the etiology of her chest discomfort has not been definitively established at this time.  She was reassured that her cardiac work-up has looked very good, including EKG, cardiac enzymes, echocardiogram, and stress testing.  Again, both LV and RV revealed no acute abnormalities.  She reports no pleuritic discomfort.  We discussed that musculoskeletal, GI causes, etc. can all mimic chest pain as well.  For that reason, patient is agreeable with close outpatient follow-up with her primary care provider.    Her TSH level was found to be low at less than 0.02 with a free T4 of 1.78 which is in the normal range.  She currently is on 175 mcg of Synthroid, and this dose can likely be decreased given her TSH finding.  Patient will follow-up with her primary care provider regarding this issue.    Repeat potassium level is stable at 3.9.  Her hemoglobin A1c is 6.0.  She will resume her outpatient diabetic medication.  Lifestyle modification including diet, exercise, and weight loss will be important for her moving forward.    All in all, patient is doing well this afternoon, experiencing no chest pain symptoms at this time, vital signs remained stable, and it is okay for us to transition to  "the outpatient setting for primary care follow-up within the next week.        Condition on Discharge:  Medically stable    Physical Exam on Discharge:  /62 (BP Location: Right arm, Patient Position: Lying)   Pulse 90   Temp 97.4 °F (36.3 °C) (Oral)   Resp 20   Ht 182.9 cm (72.01\")   Wt (!) 144 kg (317 lb 7.4 oz)   SpO2 94%   BMI 43.05 kg/m²   Physical Exam  No acute distress, resting comfortably in bed, requiring no supplemental oxygen, regular rate and rhythm with normal S1 and S2, no significant murmurs heard, no wheezing, rhonchi, rales, no pain with deep inspiration, obesity noted, alert and oriented, good historian, and pleasant to talk with.    Discharge Disposition:  Home or Self Care    Discharge Medications:     Discharge Medications      Continue These Medications      Instructions Start Date   albuterol sulfate  (90 Base) MCG/ACT inhaler  Commonly known as:  PROVENTIL HFA;VENTOLIN HFA;PROAIR HFA   2 puffs, Inhalation, Every 4 Hours PRN      ANORO ELLIPTA 62.5-25 MCG/INH aerosol powder  inhaler  Generic drug:  umeclidinium-vilanterol   1 puff, Inhalation, Daily - RT      atorvastatin 20 MG tablet  Commonly known as:  LIPITOR   20 mg, Oral, Daily      butalbital-acetaminophen-caffeine -40 MG per tablet  Commonly known as:  FIORICET, ESGIC   1 tablet, Oral, Every 4 Hours PRN      carvedilol 3.125 MG tablet  Commonly known as:  COREG   3.125 mg, Oral, 2 Times Daily With Meals      cetirizine 10 MG tablet  Commonly known as:  zyrTEC   10 mg, Oral, Daily      cholecalciferol 1000 units tablet  Commonly known as:  VITAMIN D3   2,000 Units, Oral, Daily      cromolyn 4 % ophthalmic solution  Commonly known as:  OPTICROM   1 drop, Both Eyes, Daily      fluticasone 50 MCG/ACT nasal spray  Commonly known as:  FLONASE   2 sprays, Nasal, Daily      furosemide 40 MG tablet  Commonly known as:  LASIX   40 mg, Oral, Daily      levothyroxine 175 MCG tablet  Commonly known as:  SYNTHROID, " LEVOTHROID   175 mcg, Oral, Daily      Liraglutide 18 MG/3ML solution pen-injector injection  Commonly known as:  VICTOZA   1.2 mg, Subcutaneous, Daily      lisinopril-hydrochlorothiazide 20-12.5 MG per tablet  Commonly known as:  PRINZIDE,ZESTORETIC   1 tablet, Oral, Daily      metFORMIN 1000 MG tablet  Commonly known as:  GLUCOPHAGE   1,000 mg, Oral, 2 Times Daily With Meals      traZODone 50 MG tablet  Commonly known as:  DESYREL   50 mg, Oral, Nightly      vitamin B-12 500 MCG tablet  Commonly known as:  CYANOCOBALAMIN   500 mcg, Oral, Daily      vitamin C 500 MG tablet  Commonly known as:  ASCORBIC ACID   500 mg, Oral, Daily             Discharge Diet: diabetic    Activity at Discharge: as tolerated      Follow-up Appointments:   Follow-up with primary care provider in 1 week for posthospitalization assessment regarding the above-mentioned issues.      Marco Bueno MD  06/24/19  5:01 PM    Time: 25 min

## 2019-06-24 NOTE — PLAN OF CARE
Problem: Patient Care Overview  Goal: Plan of Care Review  Outcome: Ongoing (interventions implemented as appropriate)   06/24/19 0632   Coping/Psychosocial   Plan of Care Reviewed With patient   Plan of Care Review   Progress no change   OTHER   Outcome Summary VSS, c/o chronic headache that has been going on for months and medication does not work, no c/o chest pain, Sinus 79-90 on telemetry NPO for stress echo today       Problem: Pain, Chronic (Adult)  Goal: Identify Related Risk Factors and Signs and Symptoms  Outcome: Ongoing (interventions implemented as appropriate)    Goal: Acceptable Pain/Comfort Level and Functional Ability  Outcome: Ongoing (interventions implemented as appropriate)      Problem: Cardiac: ACS (Acute Coronary Syndrome) (Adult)  Goal: Signs and Symptoms of Listed Potential Problems Will be Absent, Minimized or Managed (Cardiac: ACS)  Outcome: Ongoing (interventions implemented as appropriate)

## 2019-06-24 NOTE — H&P
[facility]  HISTORY AND PHYSICAL    Date of Admission: 6/23/2019  Primary Care Physician: Louisa Meneses APRN    Subjective     Chief Complaint: Chest pain    History of Present Illness  Patient is a 53-year-old white female past medical history of type 2 diabetes hypertension hypothyroidism hyperlipidemia and CHF.  She has a strong family history of coronary artery disease congestive heart failure both parents.  She presents the emergency room with about a one-month history of chest pain.  She states it been going on off and on for about a month progressively getting worse.  She has been having an outpatient work-up that included so far an echo 2D which she does not know the results of yet.  She is also scheduled to have an outpatient stress test on Tuesday.  Earlier today and last night she started having more severe chest pain.  She describes it as a roller coaster ride and that it is up and down to consist very quickly and more intense.  Substernal anterior pain.  It comes and it does not last any more than about 20 minutes and then goes.  She is also been having some swelling in her lower extremities at times.  She called 911 EMS gave her aspirin and a nitroglycerin and took her to [facility] the closest hospital.  Apparently the nitro she states did help some but it did not completely get rid of the pain.  She was transferred here for cardiology evaluation.  She is also noted to have an elevated white count without a source.  She also had a low potassium of 3.2 it is 3.4 here.  Her magnesium is normal her cardiac markers and EKG's have been unremarkable.  She is being admitted for further evaluation and management.  She also of note has had a migraine headache for greater than a month which is also being worked up.  She also has a history of obstructive sleep apnea but is not using BiPAP.        Review of Systems   14 point review of systems negative  except for as per HPI    Otherwise complete ROS reviewed and negative except as mentioned in the HPI.    Past Medical History:   Past Medical History:   Diagnosis Date   • Asthma    • CHF (congestive heart failure) (CMS/Formerly [facility])    • COPD (chronic obstructive pulmonary disease) (CMS/Formerly [facility])    • Diabetes mellitus (CMS/[facility]ly [facility])    • Disease of thyroid gland    • Elevated cholesterol    • Hyperlipidemia    • Hypertension      Past Surgical History:  Past Surgical History:   Procedure Laterality Date   • CHOLECYSTECTOMY     • CHOLECYSTECTOMY     • HYSTERECTOMY     • KIDNEY STONE SURGERY     • TIBIA FRACTURE SURGERY     • TUBAL ABDOMINAL LIGATION       Social History:  reports that she has never smoked. She does not have any smokeless tobacco history on file. She reports that she does not drink alcohol or use drugs.    Family History:  Positive for cardiovascular disease and CHF in both parents    Allergies:  Allergies   Allergen Reactions   • Penicillins Rash     Medications:  Prior to Admission medications    Medication Sig Start Date End Date Taking? Authorizing Provider   albuterol sulfate  (90 Base) MCG/ACT inhaler Inhale 2 puffs Every 4 (Four) Hours As Needed for Wheezing.   Yes Bill Tran MD   atorvastatin (LIPITOR) 20 MG tablet Take 20 mg by mouth Daily.   Yes Bill Tran MD   butalbital-acetaminophen-caffeine (FIORICET, ESGIC) -40 MG per tablet Take 1 tablet by mouth Every 4 (Four) Hours As Needed for Headache.   Yes Bill Tran MD   carvedilol (COREG) 3.125 MG tablet Take 3.125 mg by mouth 2 (Two) Times a Day With Meals.   Yes Bill Tran MD   cetirizine (zyrTEC) 10 MG tablet Take 10 mg by mouth Daily.   Yes Bill Tran MD   cholecalciferol (VITAMIN D3) 1000 units tablet Take 2,000 Units by mouth Daily.   Yes Bill Tran MD   furosemide (LASIX) 40 MG tablet Take 40 mg by mouth Daily.   Yes Bill Tran MD   levothyroxine (SYNTHROID,  "LEVOTHROID) 175 MCG tablet Take 175 mcg by mouth Daily.   Yes ProviderBill MD   Liraglutide (VICTOZA) 18 MG/3ML solution pen-injector injection Inject 1.2 mg under the skin into the appropriate area as directed Daily.   Yes Bill Tran MD   lisinopril-hydrochlorothiazide (PRINZIDE,ZESTORETIC) 20-12.5 MG per tablet Take 1 tablet by mouth Daily.   Yes Bill Tran MD   metFORMIN (GLUCOPHAGE) 1000 MG tablet Take 1,000 mg by mouth 2 (Two) Times a Day With Meals.   Yes Bill Tran MD   traZODone (DESYREL) 50 MG tablet Take 50 mg by mouth Every Night.   Yes Bill Tran MD     Objective     Vital Signs: /65 (BP Location: Left arm, Patient Position: Lying)   Pulse 88   Temp 98.4 °F (36.9 °C) (Oral)   Resp 20   Ht 182.9 cm (72\")   Wt (!) 144 kg (317 lb 5 oz)   SpO2 95%   BMI 43.04 kg/m²   Physical Exam  Gen.:  Well-nourished well-developed obese white female in no acute distress  HEENT: Atraumatic, normocephalic.  Pupils equal, round, and reactive to light. Extraocular movements intact.  Sclerae anicteric.  TM's negative.  Mucous membranes moist.  Neck is supple without lymphadenopathy.  No JVD is noted.  No carotid bruits are auscultated.  Oropharynx is without erythema or exudate.   Chest: Clear to auscultation and percussion.  CV: Regular rate and rhythm.  Normal S1-S2.  No gallops, murmurs. or rubs.  Abdomen: Soft, non tender, non distended.  Active bowel sounds,  No hepatosplenomegaly.  No masses.  No hernias.  Extremities: No clubbing, edema, or cyanosis.  Capillary refill is normal.  Pulses are 2+ and symmetric at radial and dorsalis pedis.  Posterior tibial pulses are intact and 2+ palpable.  Neuro: Patient is awake, alert, and oriented ×3.  Cranial nerves II through XII are grossly intact.  Motor is 5 out of 5 bilaterally.  DTR's are 2+ and symmetric bilaterally. Sensory exam is non focal  Skin: Warm, dry, and intact.  No evidence of breakdown.  Sensorium: " Normal thought and affect    Nursing notes and vital signs reviewed        Results Reviewed:  Lab Results (last 24 hours)     Procedure Component Value Units Date/Time    [place] Draw [[id number]] Collected:  06/23/19 2126    Specimen:  Blood Updated:  06/23/19 2230    Narrative:       The following orders were created for panel order [place] Draw.  Procedure                               Abnormality         Status                     ---------                               -----------         ------                     Light Blue Top[[id number]]                                   Final result               Green Top (Gel)[[id number]]                                  Final result               Lavender Top[[id number]]                                     Final result               Red Top[[id number]]                                          Final result                 Please view results for these tests on the individual orders.    Light Blue Top [[id number]] Collected:  06/23/19 2126    Specimen:  Blood Updated:  06/23/19 2230     Extra Tube hold for add-on     Comment: Auto resulted       Green Top (Gel) [[id number]] Collected:  06/23/19 2126    Specimen:  Blood Updated:  06/23/19 2230     Extra Tube Hold for add-ons.     Comment: Auto resulted.       Lavender Top [[id number]] Collected:  06/23/19 2126    Specimen:  Blood Updated:  06/23/19 2230     Extra Tube hold for add-on     Comment: Auto resulted       Red Top [[id number]] Collected:  06/23/19 2126    Specimen:  Blood Updated:  06/23/19 2230     Extra Tube Hold for add-ons.     Comment: Auto resulted.       CBC & Differential [[id number]] Collected:  06/23/19 2126    Specimen:  Blood Updated:  06/23/19 2228    Narrative:       The following orders were created for panel order CBC & Differential.  Procedure                               Abnormality         Status                     ---------                               -----------         ------                      CBC Auto Differential[[id number]]        Abnormal            Final result                 Please view results for these tests on the individual orders.    CBC Auto Differential [[id number]]  (Abnormal) Collected:  06/23/19 2126    Specimen:  Blood Updated:  06/23/19 2228     WBC 12.24 10*3/mm3      RBC 4.50 10*6/mm3      Hemoglobin 12.8 g/dL      Hematocrit 37.8 %      MCV 84.0 fL      MCH 28.4 pg      MCHC 33.9 g/dL      RDW 13.3 %      RDW-SD 40.3 fl      MPV 10.5 fL      Platelets 372 10*3/mm3     Manual Differential [[id number]]  (Normal) Collected:  06/23/19 2126    Specimen:  Blood Updated:  06/23/19 2228     Neutrophil % 56.8 %      Lymphocyte % 32.8 %      Monocyte % 6.4 %      Eosinophil % 1.6 %      Basophil % 0.8 %      Atypical Lymphocyte % 1.6 %      Neutrophils Absolute 6.95 10*3/mm3      Lymphocytes Absolute 4.01 10*3/mm3      Monocytes Absolute 0.78 10*3/mm3      Eosinophils Absolute 0.20 10*3/mm3      Basophils Absolute 0.10 10*3/mm3      RBC Morphology Normal     WBC Morphology Normal     Platelet Morphology Normal    Magnesium [[id number]]  (Normal) Collected:  06/23/19 2126    Specimen:  Blood Updated:  06/23/19 2223     Magnesium 1.7 mg/dL     BNP [[id number]]  (Normal) Collected:  06/23/19 2126    Specimen:  Blood Updated:  06/23/19 2216     proBNP 17.4 pg/mL     Comprehensive Metabolic Panel [[id number]]  (Abnormal) Collected:  06/23/19 2126    Specimen:  Blood Updated:  06/23/19 2212     Glucose 126 mg/dL      BUN 9 mg/dL      Creatinine 0.69 mg/dL      Sodium 142 mmol/L      Potassium 3.4 mmol/L      Chloride 98 mmol/L      CO2 30.0 mmol/L      Calcium 9.7 mg/dL      Total Protein 8.0 g/dL      Albumin 4.50 g/dL      ALT (SGPT) 28 U/L      AST (SGOT) 30 U/L      Alkaline Phosphatase 108 U/L      Total Bilirubin 1.0 mg/dL      eGFR Non African Amer 89 mL/min/1.73      Globulin 3.5 gm/dL      A/G Ratio 1.3 g/dL      BUN/Creatinine Ratio 13.0     Anion Gap 14.0 mmol/L     Narrative:       GFR  Normal >60  Chronic Kidney Disease <60  Kidney Failure <15    Troponin [[id number]]  (Normal) Collected:  06/23/19 2126    Specimen:  Blood Updated:  06/23/19 2156     Troponin I <0.012 ng/mL         Imaging Results (last 24 hours)     Procedure Component Value Units Date/Time    XR Chest 2 View [[id number]] Updated:  06/23/19 2206        I have personally reviewed and interpreted the radiology studies and ECG obtained at time of admission.     Assessment / Plan     Assessment:   Active Hospital Problems    Diagnosis   • Diabetes mellitus (CMS/HCC)   • Hyperlipidemia   • Hypertension   • COPD (chronic obstructive pulmonary disease) (CMS/HCC)   • Intractable migraine without aura and with status migrainosus   • Chest pain   1.  Chest pain with multiple risk factors plan is to admit patient continue ruling out for myocardial infarction with serial enzymes and EKG's.  Will monitor on telemetry.   Aspirin is been initiated will be continued.  Continue statin therapy.  Will place on a nitro drip since she is still having some pain despite being given nitro before but it did help.  If EKG's and cardiac markers are negative will obtain stress echo dobutamine stress echo in the morning.  If any of these are abnormal will consult cardiology.  2.  Hyperlipidemia continue statin therapy check lipid panel in a.m.  3.  Type 2 diabetes questionable control we do not have it toes on formulary we will hold it continue Metformin Accu-Chek sliding scale insulin as needed check hemoglobin A1c.  4.  Hypertension stable continue current medications monitor BP.  5.  Obstructive sleep apnea and probable obesity hypoventilation syndrome we will do continuous pulse oximetry her O2 sats in the low  90's in the ER.    6.  Hypokalemia which may be the cause of her pain will replace potassium monitor.  Patient seen prior to midnight      Code Status: Full     I discussed the patient's findings and my recommendations with the patient her cousin is  her healthcare power surrogate should she not be able to speak for herself.    Estimated length of stay 1 day    Liang Pinto MD   06/24/19   12:55 AM

## 2019-06-24 NOTE — ED PROVIDER NOTES
"Subjective   This is a 53-year-old  female with past medical history of CHF, diabetes, hyperlipidemia, hypertension, obesity presenting to the ER with chest pain over the last 3 months occurring intermittently.  She describes left anterior chest pain more significance over the last 1 day with pressure-like sensations lasting no more than 20 minutes at a time.  She does have outpatient nuclear stress testing as an outpatient on Tuesday in [place].  She received 324 of baby aspirin and 1 nitroglycerin which has completely resolved her symptoms.  She comes from [facility].  Her first set of cardiac enzymes there were negative and she had a sinus tachycardic EKG otherwise unremarkable.  She does have a white count of 14,000 with a chest x-ray that shows mild probable left base atelectasis or less likely infiltrate.  Clinically correlation as needed.  She denies any fevers, chills, nausea, vomiting.  She quotes states I am a heart attack waiting to happen\".  There is significant labs at prior hospital show a potassium of 3.2.  The patient states that she did have a cardiac echocardiogram at Dr. jordan's office from [place] but she has not not been informed of the results.        History provided by:  Patient   used: No        Review of Systems   Constitutional: Negative for chills and fever.   HENT: Negative for congestion and sore throat.    Respiratory: Negative for cough and shortness of breath.    Cardiovascular: Positive for chest pain. Negative for palpitations.   Gastrointestinal: Negative for abdominal pain, nausea and vomiting.   Genitourinary: Negative for dysuria and hematuria.   Musculoskeletal: Negative for arthralgias and neck pain.   Skin: Negative for rash and wound.   Neurological: Negative for dizziness, weakness and headaches.   Hematological: Negative for adenopathy. Does not bruise/bleed easily.       Past Medical History:   Diagnosis Date   • CHF " (congestive heart failure) (CMS/Formerly [facility])    • Diabetes mellitus (CMS/Formerly [facility])    • Hyperlipidemia    • Hypertension        Allergies   Allergen Reactions   • Penicillins Rash       Past Surgical History:   Procedure Laterality Date   • CHOLECYSTECTOMY     • HYSTERECTOMY     • TUBAL ABDOMINAL LIGATION         History reviewed. No pertinent family history.    Social History     Socioeconomic History   • Marital status:      Spouse name: Not on file   • Number of children: Not on file   • Years of education: Not on file   • Highest education level: Not on file   Tobacco Use   • Smoking status: Never Smoker   Substance and Sexual Activity   • Alcohol use: No     Frequency: Never   • Drug use: No       Lab Results (last 24 hours)     Procedure Component Value Units Date/Time    Troponin [[id number]]  (Normal) Collected:  06/23/19 2126    Specimen:  Blood Updated:  06/23/19 2156     Troponin I <0.012 ng/mL     BNP [[id number]]  (Normal) Collected:  06/23/19 2126    Specimen:  Blood Updated:  06/23/19 2216     proBNP 17.4 pg/mL     CBC & Differential [[id number]] Collected:  06/23/19 2126    Specimen:  Blood Updated:  06/23/19 2228    Narrative:       The following orders were created for panel order CBC & Differential.  Procedure                               Abnormality         Status                     ---------                               -----------         ------                     CBC Auto Differential[[id number]]        Abnormal            Final result                 Please view results for these tests on the individual orders.    Comprehensive Metabolic Panel [[id number]]  (Abnormal) Collected:  06/23/19 2126    Specimen:  Blood Updated:  06/23/19 2212     Glucose 126 mg/dL      BUN 9 mg/dL      Creatinine 0.69 mg/dL      Sodium 142 mmol/L      Potassium 3.4 mmol/L      Chloride 98 mmol/L      CO2 30.0 mmol/L      Calcium 9.7 mg/dL      Total Protein 8.0 g/dL      Albumin 4.50 g/dL      ALT (SGPT) 28 U/L      AST  (SGOT) 30 U/L      Alkaline Phosphatase 108 U/L      Total Bilirubin 1.0 mg/dL      eGFR Non African Amer 89 mL/min/1.73      Globulin 3.5 gm/dL      A/G Ratio 1.3 g/dL      BUN/Creatinine Ratio 13.0     Anion Gap 14.0 mmol/L     Narrative:       GFR Normal >60  Chronic Kidney Disease <60  Kidney Failure <15    CBC Auto Differential [[id number]]  (Abnormal) Collected:  06/23/19 2126    Specimen:  Blood Updated:  06/23/19 2228     WBC 12.24 10*3/mm3      RBC 4.50 10*6/mm3      Hemoglobin 12.8 g/dL      Hematocrit 37.8 %      MCV 84.0 fL      MCH 28.4 pg      MCHC 33.9 g/dL      RDW 13.3 %      RDW-SD 40.3 fl      MPV 10.5 fL      Platelets 372 10*3/mm3     Manual Differential [[id number]]  (Normal) Collected:  06/23/19 2126    Specimen:  Blood Updated:  06/23/19 2228     Neutrophil % 56.8 %      Lymphocyte % 32.8 %      Monocyte % 6.4 %      Eosinophil % 1.6 %      Basophil % 0.8 %      Atypical Lymphocyte % 1.6 %      Neutrophils Absolute 6.95 10*3/mm3      Lymphocytes Absolute 4.01 10*3/mm3      Monocytes Absolute 0.78 10*3/mm3      Eosinophils Absolute 0.20 10*3/mm3      Basophils Absolute 0.10 10*3/mm3      RBC Morphology Normal     WBC Morphology Normal     Platelet Morphology Normal    Magnesium [[id number]]  (Normal) Collected:  06/23/19 2126    Specimen:  Blood Updated:  06/23/19 2223     Magnesium 1.7 mg/dL           Objective   Physical Exam   Constitutional: She is oriented to person, place, and time. She appears well-developed and well-nourished. No distress.   HENT:   Head: Normocephalic and atraumatic.   Right Ear: External ear normal.   Left Ear: External ear normal.   Mouth/Throat: Oropharynx is clear and moist.   Eyes: Conjunctivae and EOM are normal. Pupils are equal, round, and reactive to light.   Neck: Normal range of motion.   Cardiovascular: Normal rate, regular rhythm, normal heart sounds and intact distal pulses. Exam reveals no friction rub.   No murmur heard.  Pulmonary/Chest: Effort normal  "and breath sounds normal. No respiratory distress. She has no wheezes. She has no rales. She exhibits no tenderness.   Abdominal: Soft. Bowel sounds are normal. She exhibits no distension and no mass. There is no tenderness. There is no rebound and no guarding.   Musculoskeletal: Normal range of motion.   Neurological: She is alert and oriented to person, place, and time.   Skin: Skin is warm and dry. Capillary refill takes less than 2 seconds. She is not diaphoretic.   Psychiatric: She has a normal mood and affect. Her behavior is normal.   Nursing note and vitals reviewed.      Procedures         XR Chest 2 View    (Results Pending)       /66 (BP Location: Right arm, Patient Position: Sitting)   Pulse 95   Temp 98.2 °F (36.8 °C) (Oral)   Resp 16   Ht 182.9 cm (72\")   Wt (!) 145 kg (320 lb)   SpO2 98%   BMI 43.40 kg/m²     ED Course         Medications   potassium chloride (MICRO-K) CR capsule 20 mEq (not administered)   acetaminophen (TYLENOL) tablet 1,000 mg (1,000 mg Oral Given 6/23/19 2202)           HEART Score (for prediction of 6-week risk of major adverse cardiac event) reviewed and/or performed as part of the patient evaluation and treatment planning process.  The result associated with this review/performance is: 4      MDM  Number of Diagnoses or Management Options  Chest pain, unspecified type:   Hypokalemia:   Leukocytosis, unspecified type:   Diagnosis management comments: This is a 53-year-old  female with past medical history significant for obesity, hypertension, hyperlipidemia, diabetes mellitus, heart failure who arrived to our emergency department as a transfer from [facility] with chest pain.  Transferred here as they do not have cardiology services.  She states she has worsening intermittent chest pain over the last 3 to 4 months significant worsening last 24 hours.  He does not state this is associated with any type of exertion.  Heart score is a 4 due to " comorbidities, age, moderate suspicion.  Patient does have an outpatient stress test scheduled for Tuesday and [place].  I did offer the patient to wait until then however she is requesting to be admitted at this time.  Dr. Jordan also saw this patient and he took transfer report.  Discussed with Dr. Pinto who accepts admission.  She does have mild leukocytosis and hypokalemia.  Chest x-ray appears to be clear.  Hypokalemia was repleted.       Amount and/or Complexity of Data Reviewed  Clinical lab tests: reviewed and ordered  Tests in the radiology section of CPT®: reviewed and ordered  Tests in the medicine section of CPT®: reviewed  Review and summarize past medical records: yes  Discuss the patient with other providers: yes  Independent visualization of images, tracings, or specimens: yes    Risk of Complications, Morbidity, and/or Mortality  Presenting problems: moderate  Diagnostic procedures: moderate  Management options: moderate    Patient Progress  Patient progress: improved      Final diagnoses:   Chest pain, unspecified type   Hypokalemia   Leukocytosis, unspecified type          Sloan Molina PA-C  06/23/19 2624

## 2019-06-24 NOTE — PROGRESS NOTES
"Pharmacy Dosing Service  Anticoagulant  Enoxaparin    Assessment/Action/Plan:  Lovenox 40 mg SQ every 24 hours for VTE prophylaxis has been adjusted to 40 mg SQ every 12 hours for patient with BMI > 40. Pharmacy will continue to monitor renal function and adjust dose accordingly.       Subjective:  Janette Chadwick is a 53 y.o. female on Enoxaparin 40 mg SQ every 12 hours for indication of VTE prophylaxis.  Objective:  [Ht: 182.9 cm (72\"); Wt: (!) 144 kg (317 lb 5 oz); BMI: Body mass index is 43.04 kg/m².]  Estimated Creatinine Clearance: 148.6 mL/min (by C-G formula based on SCr of 0.69 mg/dL). No results found for: INR, PROTIME   Lab Results   Component Value Date    HGB 12.8 06/23/2019      Lab Results   Component Value Date     06/23/2019       James Sandoval, PharmD  06/24/19 1:07 AM     "

## 2019-06-24 NOTE — ED NOTES
Spoke with 4B nurse. The room assigned is not ready yet. Engineering is having to come hang blinds.      Ariadna Yanez RN  06/23/19 5081

## 2019-06-25 NOTE — PROGRESS NOTES
Case Management Discharge Note    Final Note: REC'D CALL WHILE ON CALL (20:00) THAT PT'S RIDE FELL THROUGH AND SHE HAS NO OTHER OPTIONS FOR TRANSPORT HOME.  SET UP TAXI VOUCHER WITH [employer] TO TRANSPORT PT. HOME IN [place] (APPROX. $180).      Destination      No service has been selected for the patient.      Durable Medical Equipment      No service has been selected for the patient.      Dialysis/Infusion      No service has been selected for the patient.      Home Medical Care      No service has been selected for the patient.      Therapy      No service has been selected for the patient.      Community Resources      No service has been selected for the patient.             Final Discharge Disposition Code: 01 - home or self-care

## 2019-10-09 RX ORDER — ATORVASTATIN CALCIUM 20 MG/1
20 TABLET, FILM COATED ORAL DAILY
COMMUNITY

## 2019-10-09 RX ORDER — FUROSEMIDE 40 MG/1
40 TABLET ORAL DAILY
COMMUNITY

## 2019-10-09 RX ORDER — CARVEDILOL 3.12 MG/1
3.12 TABLET ORAL 2 TIMES DAILY WITH MEALS
COMMUNITY

## 2019-10-09 RX ORDER — TRAZODONE HYDROCHLORIDE 50 MG/1
50 TABLET ORAL NIGHTLY
COMMUNITY
End: 2020-01-14

## 2019-10-09 RX ORDER — LEVOTHYROXINE SODIUM 175 UG/1
175 TABLET ORAL DAILY
COMMUNITY
End: 2020-01-14 | Stop reason: DRUGHIGH

## 2019-10-09 RX ORDER — BUTALBITAL, ACETAMINOPHEN AND CAFFEINE 50; 325; 40 MG/1; MG/1; MG/1
1 TABLET ORAL EVERY 4 HOURS PRN
COMMUNITY
End: 2020-01-14

## 2019-10-09 RX ORDER — CETIRIZINE HYDROCHLORIDE 10 MG/1
10 TABLET ORAL DAILY
COMMUNITY

## 2019-10-09 RX ORDER — ALBUTEROL SULFATE 90 UG/1
2 AEROSOL, METERED RESPIRATORY (INHALATION) EVERY 4 HOURS PRN
COMMUNITY

## 2019-10-09 RX ORDER — LISINOPRIL AND HYDROCHLOROTHIAZIDE 20; 12.5 MG/1; MG/1
1 TABLET ORAL DAILY
COMMUNITY

## 2019-11-04 ENCOUNTER — TELEPHONE (OUTPATIENT)
Dept: CARDIOLOGY | Age: 54
End: 2019-11-04

## 2020-01-10 ENCOUNTER — TELEPHONE (OUTPATIENT)
Dept: CARDIOLOGY | Age: 55
End: 2020-01-10

## 2020-01-10 NOTE — TELEPHONE ENCOUNTER
Tried to call patient about her medical records-voice mail not set up-tried to call her contact Juancho Gallagher to have her call me and had to L/M to have patient return my call. [phone] [facility]

## 2020-01-13 ENCOUNTER — TELEPHONE (OUTPATIENT)
Dept: CARDIOLOGY | Age: 55
End: 2020-01-13

## 2020-01-14 ENCOUNTER — OFFICE VISIT (OUTPATIENT)
Dept: CARDIOLOGY | Age: 55
End: 2020-01-14
Payer: MEDICAID

## 2020-01-14 ENCOUNTER — HOSPITAL ENCOUNTER (OUTPATIENT)
Dept: NON INVASIVE DIAGNOSTICS | Age: 55
Discharge: HOME | End: 2020-01-14
Payer: MEDICAID

## 2020-01-14 VITALS
SYSTOLIC BLOOD PRESSURE: 106 MMHG | HEIGHT: 72 IN | BODY MASS INDEX: 39.68 KG/M2 | DIASTOLIC BLOOD PRESSURE: 60 MMHG | HEART RATE: 96 BPM | WEIGHT: 293 LBS

## 2020-01-14 DIAGNOSIS — R06.02 SOB (SHORTNESS OF BREATH): ICD-10-CM

## 2020-01-14 DIAGNOSIS — Z86.79 HISTORY OF CHF (CONGESTIVE HEART FAILURE): ICD-10-CM

## 2020-01-14 DIAGNOSIS — I10 ESSENTIAL HYPERTENSION: ICD-10-CM

## 2020-01-14 PROBLEM — E78.2 MIXED HYPERLIPIDEMIA: Status: ACTIVE | Noted: 2020-01-14

## 2020-01-14 LAB
ANION GAP SERPL CALCULATED.3IONS-SCNC: 16 MMOL/L (ref 7–19)
BUN BLDV-MCNC: 19 MG/DL (ref 6–20)
CALCIUM SERPL-MCNC: 10.1 MG/DL (ref 8.6–10)
CHLORIDE BLD-SCNC: 94 MMOL/L (ref 98–111)
CO2: 29 MMOL/L (ref 22–29)
CREAT SERPL-MCNC: 0.7 MG/DL (ref 0.5–0.9)
GFR NON-AFRICAN AMERICAN: >60
GLUCOSE BLD-MCNC: 99 MG/DL (ref 74–109)
HCT VFR BLD CALC: 44.5 % (ref 37–47)
HEMOGLOBIN: 14.2 G/DL (ref 12–16)
LV EF: 58 %
LVEF MODALITY: NORMAL
MCH RBC QN AUTO: 27.8 PG (ref 27–31)
MCHC RBC AUTO-ENTMCNC: 31.9 G/DL (ref 33–37)
MCV RBC AUTO: 87.3 FL (ref 81–99)
PDW BLD-RTO: 13.8 % (ref 11.5–14.5)
PLATELET # BLD: 400 K/UL (ref 130–400)
PMV BLD AUTO: 10.8 FL (ref 9.4–12.3)
POTASSIUM SERPL-SCNC: 4.1 MMOL/L (ref 3.5–5)
PRO-BNP: 13 PG/ML (ref 0–900)
RBC # BLD: 5.1 M/UL (ref 4.2–5.4)
SODIUM BLD-SCNC: 139 MMOL/L (ref 136–145)
WBC # BLD: 12.5 K/UL (ref 4.8–10.8)

## 2020-01-14 PROCEDURE — 99203 OFFICE O/P NEW LOW 30 MIN: CPT | Performed by: NURSE PRACTITIONER

## 2020-01-14 PROCEDURE — 93306 TTE W/DOPPLER COMPLETE: CPT

## 2020-01-14 PROCEDURE — 93000 ELECTROCARDIOGRAM COMPLETE: CPT | Performed by: NURSE PRACTITIONER

## 2020-01-14 RX ORDER — FOLIC ACID 0.8 MG
TABLET ORAL DAILY
COMMUNITY

## 2020-01-14 RX ORDER — ASCORBIC ACID 500 MG
500 TABLET ORAL DAILY
COMMUNITY

## 2020-01-14 RX ORDER — OMEPRAZOLE 40 MG/1
40 CAPSULE, DELAYED RELEASE ORAL DAILY
COMMUNITY

## 2020-01-14 RX ORDER — LEVOTHYROXINE SODIUM 0.15 MG/1
150 TABLET ORAL EVERY MORNING
COMMUNITY

## 2020-01-14 RX ORDER — FLUTICASONE PROPIONATE 50 MCG
2 SPRAY, SUSPENSION (ML) NASAL NIGHTLY
COMMUNITY

## 2020-01-14 RX ORDER — NAPROXEN 500 MG/1
500 TABLET ORAL 2 TIMES DAILY PRN
COMMUNITY

## 2020-01-14 RX ORDER — GLUCOSAM/CHON-MSM1/C/MANG/BOSW 500-416.6
1 TABLET ORAL DAILY
COMMUNITY

## 2020-01-14 RX ORDER — LEVOTHYROXINE SODIUM 0.15 MG/1
150 TABLET ORAL DAILY
Qty: 30 TABLET | Refills: 5 | Status: SHIPPED | OUTPATIENT
Start: 2020-01-14 | End: 2020-06-15

## 2020-01-14 RX ORDER — DOCUSATE SODIUM 100 MG/1
100 CAPSULE, LIQUID FILLED ORAL DAILY PRN
COMMUNITY

## 2020-01-14 RX ORDER — METFORMIN HYDROCHLORIDE 500 MG/1
2000 TABLET, EXTENDED RELEASE ORAL DAILY
COMMUNITY
End: 2020-01-14 | Stop reason: DRUGHIGH

## 2020-01-14 RX ORDER — LINACLOTIDE 72 UG/1
72 CAPSULE, GELATIN COATED ORAL
COMMUNITY
Start: 2020-01-06

## 2020-01-14 RX ORDER — POTASSIUM CHLORIDE 750 MG/1
10 CAPSULE, EXTENDED RELEASE ORAL DAILY
COMMUNITY
End: 2020-06-15

## 2020-01-14 RX ORDER — LANOLIN ALCOHOL/MO/W.PET/CERES
1000 CREAM (GRAM) TOPICAL DAILY
COMMUNITY

## 2020-01-14 RX ORDER — METFORMIN HYDROCHLORIDE 500 MG/1
1000 TABLET, EXTENDED RELEASE ORAL 2 TIMES DAILY
Status: SHIPPED | COMMUNITY
Start: 2020-01-14 | End: 2020-01-14

## 2020-01-14 RX ORDER — CROMOLYN SODIUM 40 MG/ML
1 SOLUTION/ DROPS OPHTHALMIC 4 TIMES DAILY
COMMUNITY

## 2020-01-14 RX ORDER — LORATADINE 10 MG/1
10 TABLET ORAL DAILY
COMMUNITY
End: 2020-06-15

## 2020-01-14 RX ORDER — POTASSIUM CHLORIDE 750 MG/1
TABLET, FILM COATED, EXTENDED RELEASE ORAL
COMMUNITY

## 2020-01-14 RX ORDER — CHOLECALCIFEROL (VITAMIN D3) 50 MCG
2000 TABLET ORAL DAILY
COMMUNITY
Start: 2019-12-19

## 2020-01-14 RX ORDER — BUTALBITAL, ACETAMINOPHEN AND CAFFEINE 300; 40; 50 MG/1; MG/1; MG/1
1 CAPSULE ORAL EVERY 4 HOURS PRN
COMMUNITY
End: 2020-01-14

## 2020-01-14 RX ORDER — GABAPENTIN 800 MG/1
800 TABLET ORAL 3 TIMES DAILY
COMMUNITY
End: 2020-01-14

## 2020-01-14 RX ORDER — AMITRIPTYLINE HYDROCHLORIDE 100 MG/1
100 TABLET, FILM COATED ORAL NIGHTLY
COMMUNITY

## 2020-01-14 SDOH — HEALTH STABILITY: MENTAL HEALTH: HOW OFTEN DO YOU HAVE A DRINK CONTAINING ALCOHOL?: NEVER

## 2020-01-14 NOTE — PROGRESS NOTES
disease     with heart failure    Hypokalemia     Hypomagnesemia     Hyponatremia     Hypothyroidism     Insomnia     Kidney stone     Leg cramping     Mass     6.5 x 2.4 complex mass left adnexal region    Palpitations     Pneumonia     Recurrent UTI     Sepsis (HCC)     SOB (shortness of breath) on exertion     Thyroid disease     Vitamin D deficiency      Past Surgical History:   Procedure Laterality Date     SECTION      x2    CHOLECYSTECTOMY      HYSTERECTOMY      KIDNEY STONE SURGERY      TIBIA FRACTURE SURGERY      TUBAL LIGATION       Family History   Problem Relation Age of Onset    Kidney Disease Mother     Heart Disease Mother     Diabetes Mother     Hypertension Mother     Heart Disease Father      Social History     Tobacco Use    Smoking status: Never Smoker    Smokeless tobacco: Never Used   Substance Use Topics    Alcohol use: Never     Frequency: Never      Current Outpatient Medications   Medication Sig Dispense Refill    fluticasone (FLONASE) 50 MCG/ACT nasal spray 2 sprays by Each Nostril route nightly      blood glucose test strips (TRUE METRIX BLOOD GLUCOSE TEST) strip 1 each daily      TRUEPLUS LANCETS 28G MISC 1 each by Does not apply route daily      umeclidinium-vilanterol (ANORO ELLIPTA) 62.5-25 MCG/INH AEPB inhaler Inhale 1 puff into the lungs daily Indications: powder breath activated      levomilnacipran (FETZIMA) 40 MG CP24 extended release capsule Take 40 mg by mouth daily      cromolyn (OPTICROM) 4 % ophthalmic solution 1 drop 4 times daily      Magnesium 500 MG CAPS Take by mouth daily      omeprazole (PRILOSEC) 40 MG delayed release capsule Take 40 mg by mouth daily      vitamin B-12 (CYANOCOBALAMIN) 1000 MCG tablet Take 1,000 mcg by mouth daily      amitriptyline (ELAVIL) 100 MG tablet Take 100 mg by mouth nightly      vitamin C (ASCORBIC ACID) 500 MG tablet Take 500 mg by mouth daily      loratadine (CLARITIN) 10 MG tablet Take 10 ptosis. No discharge, edema or subconjunctival hemorrhage. Neck - Symmetrical without apparent mass or lymphadenopathy. Respiratory - Normal respiratory effort without use of accessory muscles. Ausculatation reveals vesicular breath sounds without crackles, wheezes, rub or rhonchi. Cardiovascular - No jugular venous distention. Auscultation reveals regular rate and rhythm. No audible clicks, gallop or rub. No murmur. No lower extremity varicosities. No carotid bruits. Abdominal -  No visible distention, mass or pulsations. Extremities - No clubbing or cyanosis. No statis dermatitis or ulcers. No edema. Musculoskeletal -   No Osler's nodes. No kyphosis or scoliosis. Gait is even and regular without limp or shuffle. Ambulates without assistance. Skin -  Warm and dry; no rash or pallor. No new surgical wound. Neurological - No focal neurological deficits. Thought processes coherent. No apparent tremor. Oriented to person, place and time. Psychiatric -  Appropriate affect and mood. Assessment:     Diagnosis Orders   1. SOB (shortness of breath)  ECHO Complete 2D W Doppler W Color    Basic Metabolic Panel    Brain Natriuretic Peptide    CBC   2. Essential hypertension  EKG 12 lead    Basic Metabolic Panel    Brain Natriuretic Peptide    CBC   3. Mixed hyperlipidemia     4. History of CHF (congestive heart failure)  ECHO Complete 2D W Doppler W Color    Basic Metabolic Panel    Brain Natriuretic Peptide     Data reviewed:  1/15/20 echo   Summary   Structurally normal.   Normal mitral valve leaflet mobility. No evidence of mitral regurgitation. Aortic valve appears to be tricuspid. Structurally normal aortic valve. No significant aortic regurgitation or stenosis is noted. Tricuspid valve is structurally normal.   No evidence of tricuspid regurgitation. Normal left ventricular size with preserved LV function and an estimated   ejection fraction of approximately 55-60%.    No regional wall motion abnormalities identified. Grade I diastolic dysfunction. No evidence of left ventricular mass or thrombus noted. Signature    ----------------------------------------------------------------   Electronically signed by Mikey Mccartney MD(Interpreting   physician) on 01/15/2020 09:18 AM    EKG reviewed:  NSR 96 bpm;  No acute ischemic changes or ectopy. 6/24/19 [facility]  Result Narrative   · Low risk for ischemia. · All left ventricular wall segments demonstrate appropriate increase in   contractility with dobutamine infusion. ARMENTA with minimal activity - check 2D echo today and labs:  CBC, BNP and BMP. Reports hx of CHF. Multifactorial - partially related to obesity. May need to consider cath in the future. HTN - good BP control on current regimen. Hyperlipidemia - followed by PCP on Lipitor. Patient is compliant with medication regimen. BP Readings from Last 3 Encounters:   01/14/20 106/60    Pulse Readings from Last 3 Encounters:   01/14/20 96        Wt Readings from Last 3 Encounters:   01/14/20 (!) 332 lb (150.6 kg)     Plan  Previous cardiac history and records reviewed. Continue current medical management. Check 2D echo. Labs today - CBC, BMP and BNP. Continue other current medications as directed. Continue to follow up with primary care provider for non cardiac medical problems. Call the office with any problems, questions or concerns at [phone]. Cardiology follow up: 2 months. Educational included in patient instructions. Heart health.      Bruno Ramos, APRN

## 2020-01-14 NOTE — PATIENT INSTRUCTIONS
having heart disease. You can do lots of things to keep your heart healthy. It may not be easy, but you can change your diet, exercise more, and quit smoking. These steps really work to lower your chance of heart disease. Follow-up care is a key part of your treatment and safety. Be sure to make and go to all appointments, and call your doctor if you are having problems. It's also a good idea to know your test results and keep a list of the medicines you take. How can you care for yourself at home? Diet    · Use less salt when you cook and eat. This helps lower your blood pressure. Taste food before salting. Add only a little salt when you think you need it. With time, your taste buds will adjust to less salt.     · Eat fewer snack items, fast foods, canned soups, and other high-salt, high-fat, processed foods.     · Read food labels and try to avoid saturated and trans fats. They increase your risk of heart disease by raising cholesterol levels.     · Limit the amount of solid fat-butter, margarine, and shortening-you eat. Use olive, peanut, or canola oil when you cook. Bake, broil, and steam foods instead of frying them.     · Eat a variety of fruit and vegetables every day. Dark green, deep orange, red, or yellow fruits and vegetables are especially good for you. Examples include spinach, carrots, peaches, and berries.     · Foods high in fiber can reduce your cholesterol and provide important vitamins and minerals. High-fiber foods include whole-grain cereals and breads, oatmeal, beans, brown rice, citrus fruits, and apples.     · Eat lean proteins. Heart-healthy proteins include seafood, lean meats and poultry, eggs, beans, peas, nuts, seeds, and soy products.     · Limit drinks and foods with added sugar. These include candy, desserts, and soda pop.    Lifestyle changes    · If your doctor recommends it, get more exercise. Walking is a good choice. Bit by bit, increase the amount you walk every day.  Try for at least 30 minutes on most days of the week. You also may want to swim, bike, or do other activities.     · Do not smoke. If you need help quitting, talk to your doctor about stop-smoking programs and medicines. These can increase your chances of quitting for good. Quitting smoking may be the most important step you can take to protect your heart. It is never too late to quit. You will get health benefits right away.     · Limit alcohol to 2 drinks a day for men and 1 drink a day for women. Too much alcohol can cause health problems. Medicines    · Take your medicines exactly as prescribed. Call your doctor if you think you are having a problem with your medicine.     · If your doctor recommends aspirin, take the amount directed each day. Make sure you take aspirin and not another kind of pain reliever, such as acetaminophen (Tylenol). If you take ibuprofen (such as Advil or Motrin) for other problems, take aspirin at least 2 hours before taking ibuprofen. When should you call for help? Call 911 if you have symptoms of a heart attack. These may include:    · Chest pain or pressure, or a strange feeling in the chest.     · Sweating.     · Shortness of breath.     · Pain, pressure, or a strange feeling in the back, neck, jaw, or upper belly or in one or both shoulders or arms.     · Lightheadedness or sudden weakness.     · A fast or irregular heartbeat.    After you call  911, the  may tell you to chew 1 adult-strength or 2 to 4 low-dose aspirin. Wait for an ambulance. Do not try to drive yourself.   Watch closely for changes in your health, and be sure to contact your doctor if you have any problems. Where can you learn more? Go to https://Inven[employer]ensemellyBeiBei.[employer]. org and sign in to your "[employer]" account. Enter U151 in the [employer] box to learn more about \"A Healthy Heart: Care Instructions. \"     If you do not have an account, please click on the \"Sign Up Now\" link.   Current as of: April 9, 2019  Content Version: 12.3  © 2190-4796 Healthwise, Incorporated. Care instructions adapted under license by [facility]). If you have questions about a medical condition or this instruction, always ask your healthcare professional. [address] any warranty or liability for your use of this information.

## 2020-03-12 ENCOUNTER — OFFICE VISIT (OUTPATIENT)
Dept: CARDIOLOGY | Age: 55
End: 2020-03-12
Payer: MEDICAID

## 2020-03-12 VITALS
HEART RATE: 101 BPM | RESPIRATION RATE: 20 BRPM | HEIGHT: 72 IN | WEIGHT: 293 LBS | DIASTOLIC BLOOD PRESSURE: 64 MMHG | OXYGEN SATURATION: 96 % | BODY MASS INDEX: 39.68 KG/M2 | SYSTOLIC BLOOD PRESSURE: 118 MMHG

## 2020-03-12 PROBLEM — E66.01 CLASS 3 SEVERE OBESITY DUE TO EXCESS CALORIES WITHOUT SERIOUS COMORBIDITY WITH BODY MASS INDEX (BMI) OF 40.0 TO 44.9 IN ADULT (HCC): Status: ACTIVE | Noted: 2020-03-12

## 2020-03-12 PROBLEM — Z86.69 HISTORY OF SLEEP APNEA: Status: ACTIVE | Noted: 2020-03-12

## 2020-03-12 PROBLEM — I51.89 GRADE I DIASTOLIC DYSFUNCTION: Status: ACTIVE | Noted: 2020-03-12

## 2020-03-12 PROBLEM — Z86.39 HISTORY OF DIABETES MELLITUS: Status: ACTIVE | Noted: 2020-03-12

## 2020-03-12 PROCEDURE — 99214 OFFICE O/P EST MOD 30 MIN: CPT | Performed by: NURSE PRACTITIONER

## 2020-03-12 NOTE — PATIENT INSTRUCTIONS
life is one of the most rewarding gifts you can give yourself and those you love. Simply put, daily physical activity increases your length and quality of life. Strive to exercise 15 minutes most days of the week. 5)  Eat better - A healthy diet is one of your best weapons for fighting cardiovascular disease. When you eat a heart healthy diet, you improve your chances for feeling good and staying healthy for life. 6)  Lose weight - when you shed extra fat an unnecessary pounds, you reduce the burden on your hear, lungs, blood vessels and skeleton. You give yourself the gift of active living, you lower your blood pressure and help yourself feel better. 7) Stop smoking - cigarette smokers have a higher risk of developing cardiovascular disease. If  You smoke, quitting is the best thing you can do for your health. Check American Heart Association on line for more information on Life's Simple 7 and tips for healthy living. Patient Education        A Healthy Heart: Care Instructions  Your Care Instructions    Heart disease occurs when a substance called plaque builds up in the vessels that supply oxygen-rich blood to your heart. This can narrow the blood vessels and reduce blood flow. A heart attack happens when blood flow is completely blocked. A high-fat diet, smoking, and other factors increase the risk of heart disease. Your doctor has found that you have a chance of having heart disease. You can do lots of things to keep your heart healthy. It may not be easy, but you can change your diet, exercise more, and quit smoking. These steps really work to lower your chance of heart disease. Follow-up care is a key part of your treatment and safety. Be sure to make and go to all appointments, and call your doctor if you are having problems. It's also a good idea to know your test results and keep a list of the medicines you take. How can you care for yourself at home?   Diet    · Use less salt when you cook and eat. This helps lower your blood pressure. Taste food before salting. Add only a little salt when you think you need it. With time, your taste buds will adjust to less salt.     · Eat fewer snack items, fast foods, canned soups, and other high-salt, high-fat, processed foods.     · Read food labels and try to avoid saturated and trans fats. They increase your risk of heart disease by raising cholesterol levels.     · Limit the amount of solid fat-butter, margarine, and shortening-you eat. Use olive, peanut, or canola oil when you cook. Bake, broil, and steam foods instead of frying them.     · Eat a variety of fruit and vegetables every day. Dark green, deep orange, red, or yellow fruits and vegetables are especially good for you. Examples include spinach, carrots, peaches, and berries.     · Foods high in fiber can reduce your cholesterol and provide important vitamins and minerals. High-fiber foods include whole-grain cereals and breads, oatmeal, beans, brown rice, citrus fruits, and apples.     · Eat lean proteins. Heart-healthy proteins include seafood, lean meats and poultry, eggs, beans, peas, nuts, seeds, and soy products.     · Limit drinks and foods with added sugar. These include candy, desserts, and soda pop.    Lifestyle changes    · If your doctor recommends it, get more exercise. Walking is a good choice. Bit by bit, increase the amount you walk every day. Try for at least 30 minutes on most days of the week. You also may want to swim, bike, or do other activities.     · Do not smoke. If you need help quitting, talk to your doctor about stop-smoking programs and medicines. These can increase your chances of quitting for good. Quitting smoking may be the most important step you can take to protect your heart. It is never too late to quit. You will get health benefits right away.     · Limit alcohol to 2 drinks a day for men and 1 drink a day for women. Too much alcohol can cause health problems. Medicines    · Take your medicines exactly as prescribed. Call your doctor if you think you are having a problem with your medicine.     · If your doctor recommends aspirin, take the amount directed each day. Make sure you take aspirin and not another kind of pain reliever, such as acetaminophen (Tylenol). If you take ibuprofen (such as Advil or Motrin) for other problems, take aspirin at least 2 hours before taking ibuprofen. When should you call for help? Call 911 if you have symptoms of a heart attack. These may include:    · Chest pain or pressure, or a strange feeling in the chest.     · Sweating.     · Shortness of breath.     · Pain, pressure, or a strange feeling in the back, neck, jaw, or upper belly or in one or both shoulders or arms.     · Lightheadedness or sudden weakness.     · A fast or irregular heartbeat.    After you call  911, the  may tell you to chew 1 adult-strength or 2 to 4 low-dose aspirin. Wait for an ambulance. Do not try to drive yourself.   Watch closely for changes in your health, and be sure to contact your doctor if you have any problems. Where can you learn more? Go to https://[employer]. org and sign in to your [employer] account. Enter X166 in the [facility] box to learn more about \"A Healthy Heart: Care Instructions. \"     If you do not have an account, please click on the \"Sign Up Now\" link. Current as of: April 9, 2019  Content Version: 12.3  © 2117-4804 Healthwise, Incorporated. Care instructions adapted under license by [facility] [employer] [facility] If you have questions about a medical condition or this instruction, always ask your healthcare professional. [address] any warranty or liability for your use of this information.

## 2020-03-12 NOTE — PROGRESS NOTES
[facility], [place]. [address] [facility]  [phone] office  [phone] fax      OFFICE VISIT:  3/12/2020    Laurence Prader - : 1965  Reason For Visit:  Gina Rossi is a 53 y.o. female who is here for Follow-up (2 mth f/u. Pt states she is still SOB as always but no other cardiac symptoms. )    History:   Diagnosis Orders   1. Grade I diastolic dysfunction     2. Essential hypertension     3. Mixed hyperlipidemia     4. History of diabetes mellitus     5. Class 3 severe obesity due to excess calories without serious comorbidity with body mass index (BMI) of 40.0 to 44.9 in adult ([facility] [address]     6. History of sleep apnea       The patient presents today as a new patient follow up after original encounter on 20. The patient was referred for ARMENTA. The patient reports ARMENTA past 2-3 years which has not worsened. Not a new symptom or worsening. Has sleep apnea - but cant use CPAP. Sees pulmonologist Priyank Kebede. No chest pain. The patient had a stress test this past  which was negative for ischemia. A recent 2D echo showed normal EF with grade I diastolic dysfunction. The patient denies symptoms to suggest myocardial ischemia, heart failure or arrhythmia. BP is well controlled on current regimen. The patient's PCP monitors cholesterol. Family hx of CAD in parents. Patient trying to limit sodium intake. Indira Boehringer denies exertional chest pain,  orthopnea, paroxysmal nocturnal dyspnea, syncope, presyncope, sensed arrhythmia, edema and fatigue. The patient denies numbness or weakness to suggest cerebrovascular accident or transient ischemic attack. + stable ARMENTA past few years.     Laurence Prader has the following history as recorded in [facility]:  Patient Active Problem List   Diagnosis Code    Mixed hyperlipidemia E78.2    Essential hypertension I10    History of CHF (congestive heart failure) Z86.79 by mouth daily      cromolyn (OPTICROM) 4 % ophthalmic solution 1 drop 4 times daily      Magnesium 500 MG CAPS Take by mouth daily      omeprazole (PRILOSEC) 40 MG delayed release capsule Take 40 mg by mouth daily      vitamin B-12 (CYANOCOBALAMIN) 1000 MCG tablet Take 1,000 mcg by mouth daily      amitriptyline (ELAVIL) 100 MG tablet Take 100 mg by mouth nightly      vitamin C (ASCORBIC ACID) 500 MG tablet Take 500 mg by mouth daily      loratadine (CLARITIN) 10 MG tablet Take 10 mg by mouth daily      docusate sodium (COLACE) 100 MG capsule Take 100 mg by mouth daily as needed for Constipation      levothyroxine (SYNTHROID) 150 MCG tablet Take 150 mcg by mouth every morning      potassium chloride (MICRO-K) 10 MEQ extended release capsule Take 10 mEq by mouth daily      naproxen (NAPROSYN) 500 MG tablet Take 500 mg by mouth 2 times daily as needed for Pain      potassium chloride (KLOR-CON) 10 MEQ extended release tablet potassium   daily      Cholecalciferol (VITAMIN D) 50 MCG (2000 UT) TABS tablet 2,000 Units daily       LINZESS 72 MCG CAPS capsule 72 mcg every morning (before breakfast)       levothyroxine (SYNTHROID) 150 MCG tablet Take 1 tablet by mouth daily 30 tablet 5    albuterol sulfate  (90 Base) MCG/ACT inhaler Inhale 2 puffs into the lungs every 4 hours as needed for Wheezing      atorvastatin (LIPITOR) 20 MG tablet Take 20 mg by mouth daily      carvedilol (COREG) 3.125 MG tablet Take 3.125 mg by mouth 2 times daily (with meals)      cetirizine (ZYRTEC) 10 MG tablet Take 10 mg by mouth daily      vitamin D (CHOLECALCIFEROL) 1000 units TABS tablet Take 2,000 Units by mouth daily      furosemide (LASIX) 40 MG tablet Take 40 mg by mouth daily      Liraglutide (VICTOZA) 18 MG/3ML SOPN SC injection Inject 1.8 mg into the skin daily       lisinopril-hydrochlorothiazide (PRINZIDE;ZESTORETIC) 20-12.5 MG per tablet Take 1 tablet by mouth daily      metFORMIN (GLUCOPHAGE) 1000 MG tablet Take 1,000 mg by mouth 2 times daily (with meals)       No current facility-administered medications for this visit. Allergies: Penicillins and Tape [adhesive tape]    Review of Systems  Constitutional - no appetite change, or unexpected weight change. No fever, chills or diaphoresis. No significant change in activity level or new onset of fatigue. HEENT - no significant rhinorrhea or epistaxis. No tinnitus or significant hearing loss. Eyes - no sudden vision change or amaurosis. No corneal arcus, xantholasma, subconjunctival hemorrhage or discharge. Respiratory - no significant wheezing, stridor, apnea or cough. + stable ARMENTA past few years. Cardiovascular - no exertional chest pain to suggest myocardial ischemia. No orthopnea or PND. No sensation of sustained arrythmia. No occurrence of slow heart rate. No palpitations. No claudication. Gastrointestinal - no abdominal swelling or pain. No blood in stool. No severe constipation, diarrhea, nausea, or vomiting. Genitourinary - no dysuria, frequency, or urgency. No flank pain or hematuria. Musculoskeletal - no back pain or myalgia. No problems with gait. Extremities - no clubbing, cyanosis or extremity edema. Skin - no color change or rash. No pallor. No new surgical incision. Neurologic - no speech difficulty, facial asymmetry or lateralizing weakness. No seizures, presyncope or syncope. No significant dizziness. Hematologic - no easy bruising or excessive bleeding. Psychiatric - no severe anxiety or insomnia. No confusion. All other review of systems are negative. Objective  Vital Signs - /64   Pulse 101   Resp 20   Ht 6' 1\" (1.854 m)   Wt (!) 338 lb (153.3 kg)   SpO2 96%   BMI 44.59 kg/m²   General - [facility] alert, cooperative, and pleasant. Well groomed. No acute distress. Body habitus - Body mass index is 44.59 kg/m². HEENT - Head is normocephalic. No circumoral cyanosis.   Dentition is problems. Call the office with any problems, questions or concerns at [phone]. Cardiology follow up: 3 months Dr. Lauren Granda. Educational included in patient instructions. Heart health.      CELIA Elder

## 2020-05-07 ENCOUNTER — TELEPHONE (OUTPATIENT)
Dept: VASCULAR SURGERY | Facility: CLINIC | Age: 55
End: 2020-05-07

## 2020-05-22 ENCOUNTER — TELEPHONE (OUTPATIENT)
Dept: VASCULAR SURGERY | Facility: CLINIC | Age: 55
End: 2020-05-22

## 2020-05-22 NOTE — TELEPHONE ENCOUNTER
Spoke with Ms Chadwick reminding her of her appointment for Tuesday, May 26th, 2020 at 915 am with Dr Fuller. Ms Chadwick confirmed she would be here.

## 2020-05-26 ENCOUNTER — OFFICE VISIT (OUTPATIENT)
Dept: VASCULAR SURGERY | Facility: CLINIC | Age: 55
End: 2020-05-26

## 2020-05-26 VITALS
BODY MASS INDEX: 39.68 KG/M2 | DIASTOLIC BLOOD PRESSURE: 86 MMHG | HEIGHT: 72 IN | WEIGHT: 293 LBS | OXYGEN SATURATION: 98 % | HEART RATE: 100 BPM | SYSTOLIC BLOOD PRESSURE: 132 MMHG

## 2020-05-26 DIAGNOSIS — E78.5 HYPERLIPIDEMIA, UNSPECIFIED HYPERLIPIDEMIA TYPE: Chronic | ICD-10-CM

## 2020-05-26 DIAGNOSIS — I10 ESSENTIAL HYPERTENSION: Chronic | ICD-10-CM

## 2020-05-26 DIAGNOSIS — I73.9 PAD (PERIPHERAL ARTERY DISEASE) (HCC): Primary | ICD-10-CM

## 2020-05-26 PROCEDURE — 99204 OFFICE O/P NEW MOD 45 MIN: CPT | Performed by: NURSE PRACTITIONER

## 2020-05-26 RX ORDER — OMEPRAZOLE 40 MG/1
40 CAPSULE, DELAYED RELEASE ORAL DAILY
COMMUNITY
End: 2022-09-30

## 2020-05-26 RX ORDER — NAPROXEN 500 MG/1
500 TABLET ORAL AS NEEDED
COMMUNITY

## 2020-05-26 RX ORDER — LINACLOTIDE 72 UG/1
72 CAPSULE, GELATIN COATED ORAL DAILY
COMMUNITY
Start: 2020-05-07 | End: 2022-11-30 | Stop reason: SDUPTHER

## 2020-05-26 RX ORDER — BACLOFEN 20 MG
500 TABLET ORAL DAILY
COMMUNITY
Start: 2020-04-27 | End: 2022-11-01 | Stop reason: SDUPTHER

## 2020-05-26 RX ORDER — POTASSIUM CHLORIDE 750 MG/1
10 TABLET, FILM COATED, EXTENDED RELEASE ORAL DAILY
COMMUNITY
End: 2022-11-03 | Stop reason: SDUPTHER

## 2020-05-26 RX ORDER — GLUCOSAM/CHON-MSM1/C/MANG/BOSW 500-416.6
TABLET ORAL
COMMUNITY
Start: 2020-05-07 | End: 2022-11-30 | Stop reason: SDUPTHER

## 2020-05-26 NOTE — PROGRESS NOTES
05/26/2020      Louisa Meneses APRN  [address] [place] [address]    Janette Chadwick  1965    Chief Complaint   Patient presents with   • Establish Care     Referred over by Louisa CHACON for Work-up of Lower Extremity. Patient denies any stroke like symptoms.    • other     Patient states she doesnt have any circulation in her feet. Patient states her feet are very cold all the time, and feels like she is standing on pins and needles.        Dear INES Stevens:      HPI  I had the pleasure of seeing your patient Janette Chadwick in the office today.  Thank you kindly for this consultation.  As you recall, Janette Chadwick is a 54 y.o.  female who you are currently following for routine health maintenance.  She states she does not have any circulation in her feet.  She states her feet are cold all the time.  She reports they feel like she is standing on pins-and-needles.  She does have some complaints of cramping at times not especially specific to while walking.  She states she cannot walk a long distance but this is mostly due to breathing.  She does have swelling to bilateral lower extremities.  She denies previously wearing compression stockings.  She is diabetic.  She is maintained on Lipitor.    Past Medical History:   Diagnosis Date   • Asthma    • Carpal tunnel syndrome 2020   • CHF (congestive heart failure) (CMS/[facility])    • COPD (chronic obstructive pulmonary disease) (CMS/HCC)    • Diabetes mellitus (CMS/HCC)    • Disease of thyroid gland    • Elevated cholesterol    • Hyperlipidemia    • Hypertension        Past Surgical History:   Procedure Laterality Date   • CHOLECYSTECTOMY     • CHOLECYSTECTOMY     • HYSTERECTOMY     • KIDNEY STONE SURGERY     • TIBIA FRACTURE SURGERY     • TUBAL ABDOMINAL LIGATION         History reviewed. No pertinent family history.    Social History     Socioeconomic History   • Marital status:      Spouse name: Not on file   • Number of children: Not on file    • Years of education: Not on file   • Highest education level: Not on file   Tobacco Use   • Smoking status: Never Smoker   Substance and Sexual Activity   • Alcohol use: No     Frequency: Never   • Drug use: No       Allergies   Allergen Reactions   • Penicillins Rash         Current Outpatient Medications:   •  albuterol sulfate  (90 Base) MCG/ACT inhaler, Inhale 2 puffs Every 4 (Four) Hours As Needed for Wheezing., Disp: , Rfl:   •  atorvastatin (LIPITOR) 20 MG tablet, Take 20 mg by mouth Daily., Disp: , Rfl:   •  carvedilol (COREG) 3.125 MG tablet, Take 3.125 mg by mouth 2 (Two) Times a Day With Meals., Disp: , Rfl:   •  cetirizine (zyrTEC) 10 MG tablet, Take 10 mg by mouth Daily., Disp: , Rfl:   •  cholecalciferol (VITAMIN D3) 1000 units tablet, Take 2,000 Units by mouth Daily., Disp: , Rfl:   •  cromolyn (OPTICROM) 4 % ophthalmic solution, Administer 1 drop to both eyes Daily., Disp: , Rfl:   •  fluticasone (FLONASE) 50 MCG/ACT nasal spray, 2 sprays into the nostril(s) as directed by provider Daily., Disp: , Rfl:   •  furosemide (LASIX) 40 MG tablet, Take 40 mg by mouth Daily., Disp: , Rfl:   •  levothyroxine (SYNTHROID, LEVOTHROID) 175 MCG tablet, Take 175 mcg by mouth Daily., Disp: , Rfl:   •  LINZESS 72 MCG capsule capsule, Take 72 mcg by mouth Daily., Disp: , Rfl:   •  Liraglutide (VICTOZA) 18 MG/3ML solution pen-injector injection, Inject 1.2 mg under the skin into the appropriate area as directed Daily., Disp: , Rfl:   •  lisinopril-hydrochlorothiazide (PRINZIDE,ZESTORETIC) 20-12.5 MG per tablet, Take 1 tablet by mouth Daily., Disp: , Rfl:   •  Magnesium Oxide 500 MG tablet, Take 500 mg by mouth Daily., Disp: , Rfl:   •  metFORMIN (GLUCOPHAGE) 1000 MG tablet, Take 1,000 mg by mouth 2 (Two) Times a Day With Meals., Disp: , Rfl:   •  naproxen (NAPROSYN) 500 MG tablet, Take 500 mg by mouth As Needed., Disp: , Rfl:   •  omeprazole (priLOSEC) 40 MG capsule, Take 40 mg by mouth Daily., Disp: , Rfl:  "  •  potassium chloride (K-DUR) 10 MEQ CR tablet, Take 10 mEq by mouth Daily., Disp: , Rfl:   •  TRUEplus Lancets 28G misc, , Disp: , Rfl:   •  umeclidinium-vilanterol (ANORO ELLIPTA) 62.5-25 MCG/INH aerosol powder  inhaler, Inhale 1 puff Daily., Disp: , Rfl:   •  vitamin B-12 (CYANOCOBALAMIN) 500 MCG tablet, Take 500 mcg by mouth Daily., Disp: , Rfl:   •  vitamin C (ASCORBIC ACID) 500 MG tablet, Take 500 mg by mouth Daily., Disp: , Rfl:     Review of Systems   HENT: Negative.    Eyes: Negative.    Respiratory: Negative.    Cardiovascular: Positive for leg swelling (Bilateral lower extremities, right greater than left).        Leg cramping   Gastrointestinal: Negative.    Endocrine: Negative.    Genitourinary: Negative.    Musculoskeletal: Negative.    Skin: Negative.  Negative for color change and wound.   Allergic/Immunologic: Negative.    Neurological: Positive for weakness.        Feels pins-and-needles to her feet   Hematological: Negative.    Psychiatric/Behavioral: Negative.        /86 (BP Location: Right arm, Patient Position: Sitting, Cuff Size: Adult)   Pulse 100   Ht 182.9 cm (72\")   Wt (!) 155 kg (342 lb)   SpO2 98%   BMI 46.38 kg/m²   Physical Exam   Constitutional: She is oriented to person, place, and time. She appears well-developed and well-nourished. No distress.   Obese   HENT:   Head: Normocephalic and atraumatic.   Mouth/Throat: Oropharynx is clear and moist.   Eyes: Pupils are equal, round, and reactive to light. No scleral icterus.   Neck: Normal range of motion. Neck supple. No JVD present. Carotid bruit is not present. No thyromegaly present.   Cardiovascular: Normal rate, regular rhythm, S2 normal and normal heart sounds. Exam reveals no gallop and no friction rub.   No murmur heard.  Pulses:       Dorsalis pedis pulses are 2+ on the right side, and 2+ on the left side.        Posterior tibial pulses are 0 on the right side, and 0 on the left side.   Strong biphasic Doppler PT " bilaterally   Pulmonary/Chest: Effort normal and breath sounds normal.   Abdominal: Soft. Normal aorta and bowel sounds are normal. There is no hepatosplenomegaly.   Obese   Musculoskeletal: Normal range of motion.   Neurological: She is alert and oriented to person, place, and time. No cranial nerve deficit.   Skin: Skin is warm and dry. She is not diaphoretic.   Psychiatric: She has a normal mood and affect. Her behavior is normal. Judgment and thought content normal.   Nursing note and vitals reviewed.      No results found.    Patient Active Problem List   Diagnosis   • Chest pain   • Diabetes mellitus (CMS/[facility])   • Hyperlipidemia   • Hypertension   • COPD (chronic obstructive pulmonary disease) (CMS/[facility])   • Intractable migraine without aura and with status migrainosus   • Migraine   • Obesity, Class III, BMI 40-49.9 (morbid obesity) (CMS/[facility])         ICD-10-CM ICD-9-CM   1. PAD (peripheral artery disease) (CMS/[facility]) I73.9 443.9   2. Hyperlipidemia, unspecified hyperlipidemia type E78.5 272.4   3. Essential hypertension I10 401.9           Plan: After thoroughly evaluating Janette Chadwick, I believe the best course of action is to proceed with ABIs.  By exam her vascular status appears to be intact.  It sounds like most of her symptoms are related to neuropathy however she has previously taken Neurontin she states for migraines and did not notice this helped.  Her feet feel nice and warm and palpable DP and biphasic Doppler signals.  She does have swelling to bilateral lower extremities.  I did give her a prescription for compression stockings in the 20 to 30 mmHg and instructed her on how to wear these on a daily basis.  I would like her to keep her legs elevated when she is not on them.  I think that most of her problems likely stem from a low back problem.  I did discuss vascular risk factors as they pertain to the progression of vascular disease including controlling her hypertension, hyperlipidemia, and  diabetes mellitus.  These risk factors appear stable on her current medication regimen.  Body mass index is 46.38 kg/m². Patient's Body mass index is 46.38 kg/m². BMI is above normal parameters. Recommendations include: educational material and referral to primary care. The patient can continue taking their current medication regimen as previously planned.  This was all discussed in full with complete understanding.    Thank you for allowing me to participate in the care of your patient.  Please do not hesitate with any questions or concerns.  I will keep you aware of any further encounters with Janette Chadwick.        Sincerely yours,         INES Chaves

## 2020-05-26 NOTE — PATIENT INSTRUCTIONS

## 2020-06-08 ENCOUNTER — TELEPHONE (OUTPATIENT)
Dept: VASCULAR SURGERY | Facility: CLINIC | Age: 55
End: 2020-06-08

## 2020-06-08 NOTE — TELEPHONE ENCOUNTER
Spoke with Mrs Chadwick reminding her of her appointments for Tuesday, June 9th, 2020. Rem[facility] and follow up afterwards at 1045 am with Dr Fuller. Mrs Chadwick confirmed she would be here.

## 2020-06-09 ENCOUNTER — OFFICE VISIT (OUTPATIENT)
Dept: VASCULAR SURGERY | Facility: CLINIC | Age: 55
End: 2020-06-09

## 2020-06-09 ENCOUNTER — HOSPITAL ENCOUNTER (OUTPATIENT)
Dept: ULTRASOUND IMAGING | Facility: HOSPITAL | Age: 55
Discharge: HOME | End: 2020-06-09
Admitting: NURSE PRACTITIONER

## 2020-06-09 VITALS
HEIGHT: 72 IN | BODY MASS INDEX: 39.68 KG/M2 | SYSTOLIC BLOOD PRESSURE: 136 MMHG | DIASTOLIC BLOOD PRESSURE: 90 MMHG | OXYGEN SATURATION: 97 % | WEIGHT: 293 LBS | HEART RATE: 107 BPM

## 2020-06-09 DIAGNOSIS — E66.01 OBESITY, CLASS III, BMI 40-49.9 (MORBID OBESITY) (HCC): ICD-10-CM

## 2020-06-09 DIAGNOSIS — E78.2 MIXED HYPERLIPIDEMIA: Chronic | ICD-10-CM

## 2020-06-09 DIAGNOSIS — I73.9 PAD (PERIPHERAL ARTERY DISEASE) (HCC): Primary | ICD-10-CM

## 2020-06-09 DIAGNOSIS — I73.9 PAD (PERIPHERAL ARTERY DISEASE) (HCC): ICD-10-CM

## 2020-06-09 DIAGNOSIS — I10 ESSENTIAL HYPERTENSION: Chronic | ICD-10-CM

## 2020-06-09 DIAGNOSIS — G62.9 NEUROPATHY: ICD-10-CM

## 2020-06-09 PROCEDURE — 93923 UPR/LXTR ART STDY 3+ LVLS: CPT

## 2020-06-09 PROCEDURE — 93923 UPR/LXTR ART STDY 3+ LVLS: CPT | Performed by: SURGERY

## 2020-06-09 PROCEDURE — 99214 OFFICE O/P EST MOD 30 MIN: CPT | Performed by: SURGERY

## 2020-06-09 NOTE — PROGRESS NOTES
"6/9/2020       Louisa Meneses, APRN  [address] [facility] [address]    Janette Chadwick  1965    Chief Complaint   Patient presents with   • Follow-up     2 wk f/u with ABIs.         Dear Louisa Meneses, INES       HPI  I had the pleasure of seeing your patient Janette Chadwick in the office today.    As you recall, Janette Chadwick is a 54 y.o.  female who you are currently following for routine health maintenance.  She states she does not have any circulation in her feet.  She states her feet are cold all the time.  She reports they feel like she is standing on pins-and-needles.  She does have some complaints of cramping at times not especially specific to while walking.  She states she cannot walk a long distance but this is mostly due to breathing.  She does have swelling to bilateral lower extremities.  She denies previously wearing compression stockings.  She is diabetic.  She is maintained on Lipitor.  She did have noninvasive testing performed today,which I did review in office.     Review of Systems   HENT: Negative.    Eyes: Negative.    Respiratory: Negative.    Cardiovascular: Positive for leg swelling (Bilateral lower extremities, right greater than left).        Leg cramping   Gastrointestinal: Negative.    Endocrine: Negative.    Genitourinary: Negative.    Musculoskeletal: Negative.    Skin: Negative.  Negative for color change and wound.   Allergic/Immunologic: Negative.    Neurological: Positive for weakness.        Feels pins-and-needles to her feet   Hematological: Negative.    Psychiatric/Behavioral: Negative.        /90   Pulse 107   Ht 182.9 cm (72\")   Wt (!) 155 kg (342 lb)   SpO2 97%   BMI 46.38 kg/m²   Physical Exam   Constitutional: She is oriented to person, place, and time. She appears well-developed and well-nourished. No distress.   Obese   HENT:   Head: Normocephalic and atraumatic.   Mouth/Throat: Oropharynx is clear and moist.   Eyes: Pupils are equal, round, and reactive " to light. No scleral icterus.   Neck: Normal range of motion. Neck supple. No JVD present. Carotid bruit is not present. No thyromegaly present.   Cardiovascular: Normal rate, regular rhythm, S2 normal and normal heart sounds. Exam reveals no gallop and no friction rub.   No murmur heard.  Pulses:       Dorsalis pedis pulses are 2+ on the right side, and 2+ on the left side.        Posterior tibial pulses are 0 on the right side, and 0 on the left side.   Strong biphasic Doppler PT bilaterally   Pulmonary/Chest: Effort normal and breath sounds normal.   Abdominal: Soft. Normal aorta and bowel sounds are normal. There is no hepatosplenomegaly.   Obese   Musculoskeletal: Normal range of motion.   Neurological: She is alert and oriented to person, place, and time. No cranial nerve deficit.   Skin: Skin is warm and dry. She is not diaphoretic.   Psychiatric: She has a normal mood and affect. Her behavior is normal. Judgment and thought content normal.   Nursing note and vitals reviewed.    Diagnostic data:  Noninvasive testing including ABIs show right MARCO ANTONIO 1.11 and left MARCO ANTONIO 1.23      Patient Active Problem List   Diagnosis   • Chest pain   • Diabetes mellitus (CMS/[facility])   • Hyperlipidemia   • Hypertension   • COPD (chronic obstructive pulmonary disease) (CMS/[facility])   • Intractable migraine without aura and with status migrainosus   • Migraine   • Obesity, Class III, BMI 40-49.9 (morbid obesity) (CMS/[facility])         ICD-10-CM ICD-9-CM   1. PAD (peripheral artery disease) (CMS/[facility]) I73.9 443.9   2. Mixed hyperlipidemia E78.2 272.2   3. Essential hypertension I10 401.9   4. Obesity, Class III, BMI 40-49.9 (morbid obesity) (CMS/[facility]) E66.01 278.01   5. Neuropathy G62.9 355.9           Plan: After thoroughly evaluating Janette Chadwick, I believe the best course of action is to remain conservative and a vascular surgery standpoint.  I carefully reviewed her ABIs which are within normal limits.  Most of her symptomatology appears to  be from a peripheral neuropathy.  She does have diabetes as well as low back problems. She has previously taken Neurontin she states for migraines and did not notice this helped.  Her feet feel nice and warm and palpable DP and biphasic Doppler signals present.  She does have swelling to bilateral lower extremities.  I encouraged her to wear compression stockings in the 20 to 30 mmHg and instructed her on how to wear these on a daily basis.  I would like her to keep her legs elevated when she is not on them.  I think that most of her problems likely stem from a low back problem.  I did discuss vascular risk factors as they pertain to the progression of vascular disease including controlling her hypertension, hyperlipidemia, and diabetes mellitus.  These risk factors appear stable on her current medication regimen.  Body mass index is 46.38 kg/m². Patient's Body mass index is 46.38 kg/m². BMI is above normal parameters. Recommendations include: educational material and referral to primary care. The patient can continue taking their current medication regimen as previously planned.  This was all discussed in full with complete understanding.  She can follow-up on a as needed basis.    Thank you for allowing me to participate in the care of your patient.  Please do not hesitate with any questions or concerns.  I will keep you aware of any further encounters with Janette Chadwick.        Sincerely yours,         Blane Fuller, DO

## 2020-06-15 ENCOUNTER — OFFICE VISIT (OUTPATIENT)
Dept: CARDIOLOGY | Age: 55
End: 2020-06-15
Payer: MEDICAID

## 2020-06-15 VITALS
HEIGHT: 72 IN | SYSTOLIC BLOOD PRESSURE: 110 MMHG | HEART RATE: 64 BPM | BODY MASS INDEX: 39.68 KG/M2 | DIASTOLIC BLOOD PRESSURE: 70 MMHG | WEIGHT: 293 LBS

## 2020-06-15 PROBLEM — R06.09 DYSPNEA ON EFFORT: Status: ACTIVE | Noted: 2020-06-15

## 2020-06-15 PROCEDURE — 99214 OFFICE O/P EST MOD 30 MIN: CPT | Performed by: INTERNAL MEDICINE

## 2020-06-15 ASSESSMENT — ENCOUNTER SYMPTOMS
EYES NEGATIVE: 1
RESPIRATORY NEGATIVE: 1
SHORTNESS OF BREATH: 0
NAUSEA: 0
VOMITING: 0
GASTROINTESTINAL NEGATIVE: 1
DIARRHEA: 0

## 2020-06-15 NOTE — PROGRESS NOTES
Occupational History    Not on file   Social Needs    Financial resource strain: Not on file    Food insecurity     Worry: Not on file     Inability: Not on file    Transportation needs     Medical: Not on file     Non-medical: Not on file   Tobacco Use    Smoking status: Never Smoker    Smokeless tobacco: Never Used   Substance and Sexual Activity    Alcohol use: Never     Frequency: Never    Drug use: Not on file    Sexual activity: Not on file   Lifestyle    Physical activity     Days per week: Not on file     Minutes per session: Not on file    Stress: Not on file   Relationships    Social connections     Talks on phone: Not on file     Gets together: Not on file     Attends Confucianism service: Not on file     Active member of club or organization: Not on file     Attends meetings of clubs or organizations: Not on file     Relationship status: Not on file    Intimate partner violence     Fear of current or ex partner: Not on file     Emotionally abused: Not on file     Physically abused: Not on file     Forced sexual activity: Not on file   Other Topics Concern    Not on file   Social History Narrative    Not on file       Physical Examination:  /70   Pulse 64   Ht 6' (1.829 m)   Wt (!) 346 lb (156.9 kg)   BMI 46.93 kg/m²   Physical Exam  Vitals signs reviewed. Constitutional:       Appearance: She is well-developed. Neck:      Vascular: No carotid bruit or JVD. Cardiovascular:      Rate and Rhythm: Normal rate and regular rhythm. Heart sounds: Normal heart sounds. No murmur. No friction rub. No gallop. Pulmonary:      Effort: Pulmonary effort is normal. No respiratory distress. Breath sounds: Normal breath sounds. No wheezing or rales. Abdominal:      General: There is no distension. Tenderness: There is no abdominal tenderness. Lymphadenopathy:      Cervical: No cervical adenopathy. Skin:     General: Skin is warm and dry.              ASSESSMENT: Diagnosis Orders   1. Mixed hyperlipidemia     2. Essential hypertension     3. Dyspnea on effort  NM MYOCARDIAL SPECT REST EXERCISE OR RX       PLAN:  Orders Placed This Encounter   Procedures    NM MYOCARDIAL SPECT REST EXERCISE OR RX     No orders of the defined types were placed in this encounter. 1. Continue present medications  2. Recommend pharmacologic Lexiscan stress test 2-day protocol suggested  3. Encourage regular exercise if possible  4. Recommend follow-up assessment in 3 months    Return in about 3 months (around 9/15/2020) for return to Dr. Nickolas Clayton only. Brandi Best MD 6/15/2020 1:58 PM CDT    [facility]      Thisdictation was generated by voice recognition computer software. Although all attempts are made to edit the dictation for accuracy, there may be errors in the transcription that are not intended.

## 2020-07-24 ENCOUNTER — HOSPITAL ENCOUNTER (OUTPATIENT)
Dept: NUCLEAR MEDICINE | Age: 55
Discharge: HOME | End: 2020-07-26
Payer: MEDICAID

## 2020-07-24 PROCEDURE — 93017 CV STRESS TEST TRACING ONLY: CPT

## 2020-07-24 PROCEDURE — 3430000000 HC RX DIAGNOSTIC RADIOPHARMACEUTICAL: Performed by: INTERNAL MEDICINE

## 2020-07-24 PROCEDURE — A9500 TC99M SESTAMIBI: HCPCS | Performed by: INTERNAL MEDICINE

## 2020-07-24 PROCEDURE — 6360000002 HC RX W HCPCS: Performed by: INTERNAL MEDICINE

## 2020-07-24 RX ADMIN — TETRAKIS(2-METHOXYISOBUTYLISOCYANIDE)COPPER(I) TETRAFLUOROBORATE 10 MILLICURIE: 1 INJECTION, POWDER, LYOPHILIZED, FOR SOLUTION INTRAVENOUS at 11:29

## 2020-07-24 RX ADMIN — REGADENOSON 0.4 MG: 0.08 INJECTION, SOLUTION INTRAVENOUS at 10:38

## 2020-07-24 RX ADMIN — TETRAKIS(2-METHOXYISOBUTYLISOCYANIDE)COPPER(I) TETRAFLUOROBORATE 30 MILLICURIE: 1 INJECTION, POWDER, LYOPHILIZED, FOR SOLUTION INTRAVENOUS at 11:29

## 2020-07-27 LAB
LV EF: 51 %
LVEF MODALITY: NORMAL

## 2020-07-28 ENCOUNTER — TELEPHONE (OUTPATIENT)
Dept: CARDIOLOGY | Age: 55
End: 2020-07-28

## 2020-07-28 NOTE — TELEPHONE ENCOUNTER
Left voicemail for patient regarding stress test results, need to evaluate if symptomatic and if so schedule for heart cath with Mitchell.

## 2020-07-29 ENCOUNTER — TELEPHONE (OUTPATIENT)
Dept: CARDIOLOGY | Age: 55
End: 2020-07-29

## 2020-08-04 NOTE — TELEPHONE ENCOUNTER
Patient called to be set up for a [facility]. Patient is scheduled for 08/14/20 @ 7:30 with a 6:00 arrival time, patient aware. Patient is aware they need to have COVID testing done prior to procedure and will come Monday before noon to have that testing completed. Patient aware not to eat or drink after midnight and will take morning medications with a small sip of water.

## 2020-08-10 ENCOUNTER — OFFICE VISIT (OUTPATIENT)
Age: 55
End: 2020-08-10

## 2020-08-10 VITALS — TEMPERATURE: 97.7 F | OXYGEN SATURATION: 97 % | HEART RATE: 110 BPM

## 2020-08-12 ENCOUNTER — TELEPHONE (OUTPATIENT)
Age: 55
End: 2020-08-12

## 2020-08-12 LAB — SARS-COV-2, NAA: NOT DETECTED

## 2020-08-13 RX ORDER — ASPIRIN 81 MG/1
81 TABLET ORAL ONCE
Status: CANCELLED | OUTPATIENT
Start: 2020-08-14

## 2020-08-13 RX ORDER — SODIUM CHLORIDE 9 MG/ML
INJECTION, SOLUTION INTRAVENOUS CONTINUOUS
Status: CANCELLED | OUTPATIENT
Start: 2020-08-14

## 2020-08-13 RX ORDER — ONDANSETRON 2 MG/ML
4 INJECTION INTRAMUSCULAR; INTRAVENOUS EVERY 6 HOURS PRN
Status: CANCELLED | OUTPATIENT
Start: 2020-08-14

## 2020-08-14 ENCOUNTER — HOSPITAL ENCOUNTER (OUTPATIENT)
Dept: CARDIAC CATH/INVASIVE PROCEDURES | Age: 55
Discharge: HOME | End: 2020-08-14
Payer: MEDICAID

## 2020-08-17 ENCOUNTER — HOSPITAL ENCOUNTER (OUTPATIENT)
Dept: CARDIAC CATH/INVASIVE PROCEDURES | Age: 55
Discharge: HOME | End: 2020-08-17
Attending: INTERNAL MEDICINE | Admitting: INTERNAL MEDICINE
Payer: MEDICAID

## 2020-08-17 ENCOUNTER — APPOINTMENT (OUTPATIENT)
Dept: GENERAL RADIOLOGY | Age: 55
End: 2020-08-17
Attending: INTERNAL MEDICINE
Payer: MEDICAID

## 2020-08-17 VITALS
BODY MASS INDEX: 39.68 KG/M2 | OXYGEN SATURATION: 97 % | DIASTOLIC BLOOD PRESSURE: 89 MMHG | TEMPERATURE: 97.4 F | HEIGHT: 72 IN | RESPIRATION RATE: 18 BRPM | WEIGHT: 293 LBS | SYSTOLIC BLOOD PRESSURE: 125 MMHG | HEART RATE: 102 BPM

## 2020-08-17 LAB
ALBUMIN SERPL-MCNC: 4.4 G/DL (ref 3.5–5.2)
ALP BLD-CCNC: 113 U/L (ref 35–104)
ALT SERPL-CCNC: 20 U/L (ref 5–33)
ANION GAP SERPL CALCULATED.3IONS-SCNC: 8 MMOL/L (ref 7–19)
AST SERPL-CCNC: 18 U/L (ref 5–32)
BILIRUB SERPL-MCNC: 1 MG/DL (ref 0.2–1.2)
BUN BLDV-MCNC: 17 MG/DL (ref 6–20)
CALCIUM SERPL-MCNC: 9.7 MG/DL (ref 8.6–10)
CHLORIDE BLD-SCNC: 99 MMOL/L (ref 98–111)
CHOLESTEROL, TOTAL: 154 MG/DL (ref 160–199)
CO2: 33 MMOL/L (ref 22–29)
CREAT SERPL-MCNC: 0.8 MG/DL (ref 0.5–0.9)
GFR AFRICAN AMERICAN: >59
GFR NON-AFRICAN AMERICAN: >60
GLUCOSE BLD-MCNC: 186 MG/DL (ref 74–109)
HCT VFR BLD CALC: 43.3 % (ref 37–47)
HDLC SERPL-MCNC: 38 MG/DL (ref 65–121)
HEMOGLOBIN: 13.9 G/DL (ref 12–16)
LDL CHOLESTEROL CALCULATED: 80 MG/DL
MCH RBC QN AUTO: 28.1 PG (ref 27–31)
MCHC RBC AUTO-ENTMCNC: 32.1 G/DL (ref 33–37)
MCV RBC AUTO: 87.7 FL (ref 81–99)
PDW BLD-RTO: 14.3 % (ref 11.5–14.5)
PLATELET # BLD: 349 K/UL (ref 130–400)
PMV BLD AUTO: 10.4 FL (ref 9.4–12.3)
POTASSIUM REFLEX MAGNESIUM: 4.5 MMOL/L (ref 3.5–5)
RBC # BLD: 4.94 M/UL (ref 4.2–5.4)
SODIUM BLD-SCNC: 140 MMOL/L (ref 136–145)
TOTAL PROTEIN: 8.1 G/DL (ref 6.6–8.7)
TRIGL SERPL-MCNC: 181 MG/DL (ref 0–149)
WBC # BLD: 12.1 K/UL (ref 4.8–10.8)

## 2020-08-17 PROCEDURE — 71046 X-RAY EXAM CHEST 2 VIEWS: CPT

## 2020-08-17 PROCEDURE — 80053 COMPREHEN METABOLIC PANEL: CPT

## 2020-08-17 PROCEDURE — 99152 MOD SED SAME PHYS/QHP 5/>YRS: CPT | Performed by: INTERNAL MEDICINE

## 2020-08-17 PROCEDURE — 2500000003 HC RX 250 WO HCPCS

## 2020-08-17 PROCEDURE — 6370000000 HC RX 637 (ALT 250 FOR IP): Performed by: INTERNAL MEDICINE

## 2020-08-17 PROCEDURE — C1894 INTRO/SHEATH, NON-LASER: HCPCS

## 2020-08-17 PROCEDURE — C1769 GUIDE WIRE: HCPCS

## 2020-08-17 PROCEDURE — 36415 COLL VENOUS BLD VENIPUNCTURE: CPT

## 2020-08-17 PROCEDURE — 93458 L HRT ARTERY/VENTRICLE ANGIO: CPT

## 2020-08-17 PROCEDURE — 6360000004 HC RX CONTRAST MEDICATION: Performed by: INTERNAL MEDICINE

## 2020-08-17 PROCEDURE — 80061 LIPID PANEL: CPT

## 2020-08-17 PROCEDURE — 99024 POSTOP FOLLOW-UP VISIT: CPT | Performed by: INTERNAL MEDICINE

## 2020-08-17 PROCEDURE — 2580000003 HC RX 258: Performed by: INTERNAL MEDICINE

## 2020-08-17 PROCEDURE — 6360000002 HC RX W HCPCS

## 2020-08-17 PROCEDURE — 99152 MOD SED SAME PHYS/QHP 5/>YRS: CPT

## 2020-08-17 PROCEDURE — 2709999900 HC NON-CHARGEABLE SUPPLY

## 2020-08-17 PROCEDURE — 85027 COMPLETE CBC AUTOMATED: CPT

## 2020-08-17 PROCEDURE — 93458 L HRT ARTERY/VENTRICLE ANGIO: CPT | Performed by: INTERNAL MEDICINE

## 2020-08-17 RX ORDER — ACETAMINOPHEN 325 MG/1
650 TABLET ORAL EVERY 4 HOURS PRN
Status: DISCONTINUED | OUTPATIENT
Start: 2020-08-17 | End: 2020-08-17 | Stop reason: HOSPADM

## 2020-08-17 RX ORDER — ONDANSETRON 2 MG/ML
4 INJECTION INTRAMUSCULAR; INTRAVENOUS EVERY 6 HOURS PRN
Status: DISCONTINUED | OUTPATIENT
Start: 2020-08-17 | End: 2020-08-17

## 2020-08-17 RX ORDER — SODIUM CHLORIDE 0.9 % (FLUSH) 0.9 %
10 SYRINGE (ML) INJECTION EVERY 12 HOURS SCHEDULED
Status: DISCONTINUED | OUTPATIENT
Start: 2020-08-17 | End: 2020-08-17 | Stop reason: HOSPADM

## 2020-08-17 RX ORDER — HYDRALAZINE HYDROCHLORIDE 20 MG/ML
10 INJECTION INTRAMUSCULAR; INTRAVENOUS EVERY 10 MIN PRN
Status: DISCONTINUED | OUTPATIENT
Start: 2020-08-17 | End: 2020-08-17 | Stop reason: HOSPADM

## 2020-08-17 RX ORDER — SODIUM CHLORIDE 9 MG/ML
INJECTION, SOLUTION INTRAVENOUS CONTINUOUS
Status: DISCONTINUED | OUTPATIENT
Start: 2020-08-17 | End: 2020-08-17 | Stop reason: HOSPADM

## 2020-08-17 RX ORDER — ASPIRIN 81 MG/1
81 TABLET ORAL ONCE
Status: COMPLETED | OUTPATIENT
Start: 2020-08-17 | End: 2020-08-17

## 2020-08-17 RX ORDER — ONDANSETRON 2 MG/ML
4 INJECTION INTRAMUSCULAR; INTRAVENOUS EVERY 6 HOURS PRN
Status: DISCONTINUED | OUTPATIENT
Start: 2020-08-17 | End: 2020-08-17 | Stop reason: HOSPADM

## 2020-08-17 RX ORDER — SODIUM CHLORIDE 0.9 % (FLUSH) 0.9 %
10 SYRINGE (ML) INJECTION PRN
Status: DISCONTINUED | OUTPATIENT
Start: 2020-08-17 | End: 2020-08-17 | Stop reason: HOSPADM

## 2020-08-17 RX ORDER — SODIUM CHLORIDE 9 MG/ML
1000 INJECTION, SOLUTION INTRAVENOUS CONTINUOUS
Status: DISCONTINUED | OUTPATIENT
Start: 2020-08-17 | End: 2020-08-17 | Stop reason: HOSPADM

## 2020-08-17 RX ADMIN — SODIUM CHLORIDE: 9 INJECTION, SOLUTION INTRAVENOUS at 08:53

## 2020-08-17 RX ADMIN — ASPIRIN 81 MG: 81 TABLET, COATED ORAL at 08:53

## 2020-08-17 RX ADMIN — IOPAMIDOL 151 ML: 612 INJECTION, SOLUTION INTRAVENOUS at 10:38

## 2020-08-17 NOTE — PROGRESS NOTES
Patient ambulated in hallway and to bathroom without difficulty. Discharge instructions given and patient verbalized understanding.

## 2020-08-17 NOTE — PROGRESS NOTES
Cardiac catheterization preliminary note right radial artery tolerated well left ventricular function normal coronary angiograms normal

## 2020-08-17 NOTE — H&P
Office Visit     6/15/2020  [facility]   Interventional Cardiology   Mixed hyperlipidemia +2 more   Dx   3 Month Follow-Up     Reason for Visit    Progress Notes     Expand All Collapse All    [facility] Progress Note                              Date:                6/15/2020  Patient:            Raheem Rizvi  Age:                 53 y.o., 1965        Reason for evaluation:            SUBJECTIVE:    Seen today first-time for assessment of shortness of breath history of diastolic congestive heart failure. Gets out of breath to some extent somewhat variable this is actually better than it was a year ago. Does not exercise much her feet killer she states. Wakes up at night several times to go to the bathroom but denies nocturnal dyspnea denies anginal chest pain. History of asthma. Echocardiogram 1/14/2020 normal left ventricular systolic function ejection fraction 55 to 60% impaired left ventricular relaxation pattern. Has never undergone invasive assessment. proBNP level was 13 on 1/14/2020. No recent stress test.     Review of Systems   Constitutional: Negative. Negative for chills, fever and unexpected weight change. HENT: Negative. Eyes: Negative. Respiratory: Negative. Negative for shortness of breath. Cardiovascular: Negative. Negative for chest pain. Gastrointestinal: Negative. Negative for diarrhea, nausea and vomiting. Endocrine: Negative. Genitourinary: Negative. Musculoskeletal: Negative. Skin: Negative. Neurological: Negative. All other systems reviewed and are negative.           OBJECTIVE:     /70   Pulse 64   Ht 6' (1.829 m)   Wt (!) 346 lb (156.9 kg)   BMI 46.93 kg/m²      Labs:   CBC: No results for input(s): WBC, HGB, HCT, PLT in the last 72 hours. BMP:No results for input(s): NA, K, CO2, BUN, CREATININE, LABGLOM, GLUCOSE in the last 72 hours.   BNP: No results for input(s): BNP in the last 72 hours. PT/INR: No results for input(s): PROTIME, INR in the last 72 hours. APTT:No results for input(s): APTT in the last 72 hours. CARDIAC ENZYMES:No results for input(s): CKTOTAL, CKMB, CKMBINDEX, TROPONINI in the last 72 hours.   FASTING LIPID PANEL:No results found for: HDL, LDLDIRECT, LDLCALC, TRIG  LIVER PROFILE:No results for input(s): AST, ALT, LABALBU in the last 72 hours.         Past Medical History        Past Medical History:   Diagnosis Date    Asthma      Asthma      Chest pain     CHF (congestive heart failure) (HCC)      Colon, diverticulosis      COPD (chronic obstructive pulmonary disease) (HCC)      CPAP (continuous positive airway pressure) dependence      Depression      Diabetes mellitus (HCC)      Fatty liver      GERD (gastroesophageal reflux disease)      Hypokalemia      Hypomagnesemia      Hyponatremia      Hypothyroidism      Insomnia      Kidney stone      Leg cramping      Mass       6.5 x 2.4 complex mass left adnexal region    Neuropathy      Palpitations      Pneumonia      Recurrent UTI      Sepsis (HCC)      SOB (shortness of breath) on exertion      Thyroid disease      Vitamin D deficiency          Past Surgical History         Past Surgical History:   Procedure Laterality Date     SECTION         x2    CHOLECYSTECTOMY        HYSTERECTOMY        KIDNEY STONE SURGERY        TIBIA FRACTURE SURGERY        TUBAL LIGATION            Family History         Family History   Problem Relation Age of Onset    Kidney Disease Mother      Heart Disease Mother      Diabetes Mother      Hypertension Mother      Heart Disease Father               Allergies   Allergen Reactions    Penicillins Rash    Tape Myke Dye Tape] Rash     Current Facility-Administered Medications          Current Outpatient Medications   Medication Sig Dispense Refill    fluticasone (FLONASE) 50 MCG/ACT nasal spray 2 sprays by Each Nostril route nightly        blood glucose test strips (TRUE METRIX BLOOD GLUCOSE TEST) strip 1 each daily        TRUEPLUS LANCETS 28G MISC 1 each by Does not apply route daily        umeclidinium-vilanterol (ANORO ELLIPTA) 62.5-25 MCG/INH AEPB inhaler Inhale 1 puff into the lungs daily Indications: powder breath activated        levomilnacipran (FETZIMA) 40 MG CP24 extended release capsule Take 40 mg by mouth daily        cromolyn (OPTICROM) 4 % ophthalmic solution 1 drop 4 times daily        Magnesium 500 MG CAPS Take by mouth daily        omeprazole (PRILOSEC) 40 MG delayed release capsule Take 40 mg by mouth daily        vitamin B-12 (CYANOCOBALAMIN) 1000 MCG tablet Take 1,000 mcg by mouth daily        amitriptyline (ELAVIL) 100 MG tablet Take 100 mg by mouth nightly        vitamin C (ASCORBIC ACID) 500 MG tablet Take 500 mg by mouth daily        docusate sodium (COLACE) 100 MG capsule Take 100 mg by mouth daily as needed for Constipation        levothyroxine (SYNTHROID) 150 MCG tablet Take 150 mcg by mouth every morning        naproxen (NAPROSYN) 500 MG tablet Take 500 mg by mouth 2 times daily as needed for Pain        potassium chloride (KLOR-CON) 10 MEQ extended release tablet potassium   daily        Cholecalciferol (VITAMIN D) 50 MCG (2000 UT) TABS tablet 2,000 Units daily         LINZESS 72 MCG CAPS capsule 72 mcg every morning (before breakfast)         albuterol sulfate  (90 Base) MCG/ACT inhaler Inhale 2 puffs into the lungs every 4 hours as needed for Wheezing        atorvastatin (LIPITOR) 20 MG tablet Take 20 mg by mouth daily        carvedilol (COREG) 3.125 MG tablet Take 3.125 mg by mouth 2 times daily (with meals)        cetirizine (ZYRTEC) 10 MG tablet Take 10 mg by mouth daily        vitamin D (CHOLECALCIFEROL) 1000 units TABS tablet Take 2,000 Units by mouth daily        furosemide (LASIX) 40 MG tablet Take 40 mg by mouth daily        Liraglutide (VICTOZA) 18 MG/3ML SOPN SC injection Inject 1.8 mg into the skin daily         lisinopril-hydrochlorothiazide (PRINZIDE;ZESTORETIC) 20-12.5 MG per tablet Take 1 tablet by mouth daily        metFORMIN (GLUCOPHAGE) 1000 MG tablet Take 1,000 mg by mouth 2 times daily (with meals)          No current facility-administered medications for this visit. Social History               Socioeconomic History    Marital status: Single       Spouse name: Not on file    Number of children: Not on file    Years of education: Not on file    Highest education level: Not on file   Occupational History    Not on file   Social Needs    Financial resource strain: Not on file    Food insecurity       Worry: Not on file       Inability: Not on file    Transportation needs       Medical: Not on file       Non-medical: Not on file   Tobacco Use    Smoking status: Never Smoker    Smokeless tobacco: Never Used   Substance and Sexual Activity    Alcohol use: Never       Frequency: Never    Drug use: Not on file    Sexual activity: Not on file   Lifestyle    Physical activity       Days per week: Not on file       Minutes per session: Not on file    Stress: Not on file   Relationships    Social connections       Talks on phone: Not on file       Gets together: Not on file       Attends Jewish service: Not on file       Active member of club or organization: Not on file       Attends meetings of clubs or organizations: Not on file       Relationship status: Not on file    Intimate partner violence       Fear of current or ex partner: Not on file       Emotionally abused: Not on file       Physically abused: Not on file       Forced sexual activity: Not on file   Other Topics Concern    Not on file   Social History Narrative    Not on file           Physical Examination:  /70   Pulse 64   Ht 6' (1.829 m)   Wt (!) 346 lb (156.9 kg)   BMI 46.93 kg/m²   Physical Exam  Vitals signs reviewed.    Constitutional:       Appearance: She is well-developed. Neck:      Vascular: No carotid bruit or JVD. Cardiovascular:      Rate and Rhythm: Normal rate and regular rhythm. Heart sounds: Normal heart sounds. No murmur. No friction rub. No gallop. Pulmonary:      Effort: Pulmonary effort is normal. No respiratory distress. Breath sounds: Normal breath sounds. No wheezing or rales. Abdominal:      General: There is no distension. Tenderness: There is no abdominal tenderness. Lymphadenopathy:      Cervical: No cervical adenopathy. Skin:     General: Skin is warm and dry.                ASSESSMENT:      Diagnosis Orders   1. Mixed hyperlipidemia      2. Essential hypertension      3. Dyspnea on effort  NM MYOCARDIAL SPECT REST EXERCISE OR RX        PLAN:      Orders Placed This Encounter   Procedures    NM MYOCARDIAL SPECT REST EXERCISE OR RX       Encounter Medications    No orders of the defined types were placed in this encounter.           1. Continue present medications  2. Recommend pharmacologic Lexiscan stress test 2-day protocol suggested  3. Encourage regular exercise if possible  4. Recommend follow-up assessment in 3 months     Return in about 3 months (around 9/15/2020) for return to Dr. Laureano Hood only.        Dorian Chou MD 6/15/2020 1:58 PM CDT     [facility]        Admitted for elective diagnostic cardiac catheterization stress test 7/24/2020 revealed moderate anterior wall ischemia EF 51%. No significant interval history change since above visit. Advised indications alternatives benefits and risk patient agreeable.   I have discussed with the patient regarding indications for the proposed procedure LEFT HEART CATHETERIZATION AND POSSIBLE PERCUTANEOUS INTERVENTION  along with possible alternatives benefits and risks including but not limited to risks of death, myocardial infarction, stroke, contrast induced nephropathy which in some cases may lead to acute kidney failure requiring dialysis, allergic reactions, bleeding requiring blood transfusion,  cardiac arrhthymias, respiratory failure which may require placing the patient on respiratory support such as a ventilator or breathing machine,risk of complications which may require vascular surgery, and if coronary intervention is performed emergency CABG may be required in less than 1% of cases. The patient is awake and alert and understands the issues involved and indicates willingness to proceed as ordered. The patient does not have any contraindications to dual antiplatelet therapy. The patient does not have any known  pending surgical procedures in the next 12 months at this time. The patient is  a reasonable candidate for moderate conscious sedation.     ASA score:  ASA 3 - Patient with moderate systemic disease with functional limitations    Mallampati: I (soft palate, uvula, fauces, tonsillar pillars visible)    Preferred vascular access site will be: right radial artery

## 2020-09-17 ENCOUNTER — OFFICE VISIT (OUTPATIENT)
Dept: CARDIOLOGY | Age: 55
End: 2020-09-17
Payer: MEDICAID

## 2020-09-17 VITALS
HEIGHT: 72 IN | WEIGHT: 293 LBS | BODY MASS INDEX: 39.68 KG/M2 | HEART RATE: 96 BPM | SYSTOLIC BLOOD PRESSURE: 128 MMHG | DIASTOLIC BLOOD PRESSURE: 74 MMHG

## 2020-09-17 PROCEDURE — 99213 OFFICE O/P EST LOW 20 MIN: CPT | Performed by: INTERNAL MEDICINE

## 2020-09-17 ASSESSMENT — ENCOUNTER SYMPTOMS
GASTROINTESTINAL NEGATIVE: 1
NAUSEA: 0
DIARRHEA: 0
SHORTNESS OF BREATH: 0
VOMITING: 0
EYES NEGATIVE: 1
RESPIRATORY NEGATIVE: 1

## 2020-09-17 NOTE — PROGRESS NOTES
[facility] Progress Note                            Date:  9/17/2020  Patient: Lynnette Owens  Age:  53 y.o., 1965      Reason for evaluation:         SUBJECTIVE:    Returns today for follow-up assessment. Underwent cardiac catheterization normal coronary arteries normal left ventricular function. Has mild edema which is controlled on her various medications. Dyspnea about the same no other complaints or issues noted. Echocardiogram was unremarkable except for impaired relaxation whether that normal age-related or secondary to stage I diastolic dysfunction not entirely clear. Review of Systems   Constitutional: Negative. Negative for chills, fever and unexpected weight change. HENT: Negative. Eyes: Negative. Respiratory: Negative. Negative for shortness of breath. Cardiovascular: Negative. Negative for chest pain. Gastrointestinal: Negative. Negative for diarrhea, nausea and vomiting. Endocrine: Negative. Genitourinary: Negative. Musculoskeletal: Negative. Skin: Negative. Neurological: Negative. All other systems reviewed and are negative. OBJECTIVE:     /74   Pulse 96   Ht 6' (1.829 m)   Wt (!) 355 lb (161 kg)   BMI 48.15 kg/m²     Labs:   CBC: No results for input(s): WBC, HGB, HCT, PLT in the last 72 hours. BMP:No results for input(s): NA, K, CO2, BUN, CREATININE, LABGLOM, GLUCOSE in the last 72 hours. BNP: No results for input(s): BNP in the last 72 hours. PT/INR: No results for input(s): PROTIME, INR in the last 72 hours. APTT:No results for input(s): APTT in the last 72 hours. CARDIAC ENZYMES:No results for input(s): CKTOTAL, CKMB, CKMBINDEX, TROPONINI in the last 72 hours. FASTING LIPID PANEL:  Lab Results   Component Value Date    HDL 38 08/17/2020    LDLCALC 80 08/17/2020    TRIG 181 08/17/2020     LIVER PROFILE:No results for input(s): AST, ALT, LABALBU in the last 72 hours.         Past Medical History:   Diagnosis Date    Asthma     Asthma     Chest pain     CHF (congestive heart failure) (HCC)     Colon, diverticulosis     COPD (chronic obstructive pulmonary disease) (HCC)     CPAP (continuous positive airway pressure) dependence     Depression     Diabetes mellitus (HCC)     Fatty liver     GERD (gastroesophageal reflux disease)     Hypokalemia     Hypomagnesemia     Hyponatremia     Hypothyroidism     Insomnia     Kidney stone     Leg cramping     Mass     6.5 x 2.4 complex mass left adnexal region    Neuropathy     Palpitations     Pneumonia     Recurrent UTI     Sepsis (HCC)     SOB (shortness of breath) on exertion     Thyroid disease     Vitamin D deficiency      Past Surgical History:   Procedure Laterality Date     SECTION      x2    CHOLECYSTECTOMY      HYSTERECTOMY      KIDNEY STONE SURGERY      TIBIA FRACTURE SURGERY      TUBAL LIGATION       Family History   Problem Relation Age of Onset    Kidney Disease Mother     Heart Disease Mother     Diabetes Mother     Hypertension Mother     Heart Disease Father      Allergies   Allergen Reactions    Penicillins Rash    Tape Yvonnie Paci Tape] Rash     Current Outpatient Medications   Medication Sig Dispense Refill    metFORMIN (GLUCOPHAGE) 1000 MG tablet Take 1 tablet by mouth 2 times daily (with meals) Hold 48 hours post-cath 60 tablet 3    fluticasone (FLONASE) 50 MCG/ACT nasal spray 2 sprays by Each Nostril route nightly      blood glucose test strips (TRUE METRIX BLOOD GLUCOSE TEST) strip 1 each daily      TRUEPLUS LANCETS 28G MISC 1 each by Does not apply route daily      umeclidinium-vilanterol (ANORO ELLIPTA) 62.5-25 MCG/INH AEPB inhaler Inhale 1 puff into the lungs daily Indications: powder breath activated      levomilnacipran (FETZIMA) 40 MG CP24 extended release capsule Take 40 mg by mouth daily      cromolyn (OPTICROM) 4 % ophthalmic solution 1 drop 4 times daily      Magnesium 500 MG CAPS Take by mouth daily  omeprazole (PRILOSEC) 40 MG delayed release capsule Take 40 mg by mouth daily      vitamin B-12 (CYANOCOBALAMIN) 1000 MCG tablet Take 1,000 mcg by mouth daily      amitriptyline (ELAVIL) 100 MG tablet Take 100 mg by mouth nightly      vitamin C (ASCORBIC ACID) 500 MG tablet Take 500 mg by mouth daily      docusate sodium (COLACE) 100 MG capsule Take 100 mg by mouth daily as needed for Constipation      levothyroxine (SYNTHROID) 150 MCG tablet Take 150 mcg by mouth every morning      naproxen (NAPROSYN) 500 MG tablet Take 500 mg by mouth 2 times daily as needed for Pain      potassium chloride (KLOR-CON) 10 MEQ extended release tablet potassium   daily      Cholecalciferol (VITAMIN D) 50 MCG (2000 UT) TABS tablet 2,000 Units daily       LINZESS 72 MCG CAPS capsule 72 mcg every morning (before breakfast)       albuterol sulfate  (90 Base) MCG/ACT inhaler Inhale 2 puffs into the lungs every 4 hours as needed for Wheezing      atorvastatin (LIPITOR) 20 MG tablet Take 20 mg by mouth daily      carvedilol (COREG) 3.125 MG tablet Take 3.125 mg by mouth 2 times daily (with meals)      cetirizine (ZYRTEC) 10 MG tablet Take 10 mg by mouth daily      furosemide (LASIX) 40 MG tablet Take 40 mg by mouth daily      Liraglutide (VICTOZA) 18 MG/3ML SOPN SC injection Inject 1.8 mg into the skin daily       lisinopril-hydrochlorothiazide (PRINZIDE;ZESTORETIC) 20-12.5 MG per tablet Take 1 tablet by mouth daily       No current facility-administered medications for this visit.       Social History     Socioeconomic History    Marital status: Single     Spouse name: Not on file    Number of children: Not on file    Years of education: Not on file    Highest education level: Not on file   Occupational History    Not on file   Social Needs    Financial resource strain: Not on file    Food insecurity     Worry: Not on file     Inability: Not on file    Transportation needs     Medical: Not on file Non-medical: Not on file   Tobacco Use    Smoking status: Never Smoker    Smokeless tobacco: Never Used   Substance and Sexual Activity    Alcohol use: Never     Frequency: Never    Drug use: Not on file    Sexual activity: Not on file   Lifestyle    Physical activity     Days per week: Not on file     Minutes per session: Not on file    Stress: Not on file   Relationships    Social connections     Talks on phone: Not on file     Gets together: Not on file     Attends Protestant service: Not on file     Active member of club or organization: Not on file     Attends meetings of clubs or organizations: Not on file     Relationship status: Not on file    Intimate partner violence     Fear of current or ex partner: Not on file     Emotionally abused: Not on file     Physically abused: Not on file     Forced sexual activity: Not on file   Other Topics Concern    Not on file   Social History Narrative    Not on file       Physical Examination:  /74   Pulse 96   Ht 6' (1.829 m)   Wt (!) 355 lb (161 kg)   BMI 48.15 kg/m²   Physical Exam  Vitals signs reviewed. Constitutional:       Appearance: She is well-developed. Neck:      Vascular: No carotid bruit or JVD. Cardiovascular:      Rate and Rhythm: Normal rate and regular rhythm. Heart sounds: Normal heart sounds. No murmur. No friction rub. No gallop. Pulmonary:      Effort: Pulmonary effort is normal. No respiratory distress. Breath sounds: Normal breath sounds. No wheezing or rales. Abdominal:      General: There is no distension. Tenderness: There is no abdominal tenderness. Lymphadenopathy:      Cervical: No cervical adenopathy. Skin:     General: Skin is warm and dry. ASSESSMENT:     Diagnosis Orders   1. Mixed hyperlipidemia     2. Essential hypertension         PLAN:  No orders of the defined types were placed in this encounter.     No orders of the defined types were placed in this

## 2021-09-21 ENCOUNTER — TELEPHONE (OUTPATIENT)
Dept: CARDIOLOGY CLINIC | Age: 56
End: 2021-09-21

## 2021-09-21 NOTE — TELEPHONE ENCOUNTER
Sameera Hendrickson requests that office  return their call. The best time to reach her is Anytime. Patient needs to rescheduled her appointment with Dr Marcos Mays call patient. Thank you.

## 2021-10-04 NOTE — TELEPHONE ENCOUNTER
Lucinda Reilly requests that office  return their call. The best time to reach her is Anytime. Patient needs rescheduled her follow up with Dr Gladis Pineda please call @ [phone]    Thank you.

## 2022-02-21 ENCOUNTER — OFFICE VISIT (OUTPATIENT)
Dept: CARDIOLOGY CLINIC | Age: 57
End: 2022-02-21
Payer: MEDICAID

## 2022-02-21 VITALS
BODY MASS INDEX: 39.68 KG/M2 | DIASTOLIC BLOOD PRESSURE: 88 MMHG | HEIGHT: 72 IN | HEART RATE: 109 BPM | WEIGHT: 293 LBS | SYSTOLIC BLOOD PRESSURE: 128 MMHG

## 2022-02-21 DIAGNOSIS — R94.39 ABNORMAL STRESS TEST: ICD-10-CM

## 2022-02-21 DIAGNOSIS — E78.2 MIXED HYPERLIPIDEMIA: ICD-10-CM

## 2022-02-21 DIAGNOSIS — R06.09 DYSPNEA ON EFFORT: ICD-10-CM

## 2022-02-21 DIAGNOSIS — I51.89 GRADE I DIASTOLIC DYSFUNCTION: ICD-10-CM

## 2022-02-21 DIAGNOSIS — I10 ESSENTIAL HYPERTENSION: Primary | ICD-10-CM

## 2022-02-21 PROCEDURE — 99213 OFFICE O/P EST LOW 20 MIN: CPT | Performed by: INTERNAL MEDICINE

## 2022-02-21 PROCEDURE — 93000 ELECTROCARDIOGRAM COMPLETE: CPT | Performed by: INTERNAL MEDICINE

## 2022-02-21 RX ORDER — SEMAGLUTIDE 1.34 MG/ML
1 INJECTION, SOLUTION SUBCUTANEOUS
COMMUNITY

## 2022-02-21 RX ORDER — LEVOTHYROXINE SODIUM 125 UG/1
125 CAPSULE ORAL DAILY
COMMUNITY

## 2022-02-21 ASSESSMENT — ENCOUNTER SYMPTOMS
SHORTNESS OF BREATH: 0
RESPIRATORY NEGATIVE: 1
NAUSEA: 0
GASTROINTESTINAL NEGATIVE: 1
DIARRHEA: 0
EYES NEGATIVE: 1
VOMITING: 0

## 2022-02-21 NOTE — PROGRESS NOTES
[facility] Progress Note                            Date:  2/21/2022  Patient: Areli Tenorio  Age:  61 y.o., 1965      Reason for evaluation:         SUBJECTIVE:    Returns today follow-up assessment hypertension hyperlipidemia obesity. Overall feels tired and fatigued but no specific complaints denies chest pain dyspnea about the same no new complaints or issues reported. Blood pressure 128/88 heart rate 109. Cardiac catheterization 8/17/2020 showed normal left ventricular systolic function normal coronary arteries LVEDP 22 mmHg. Echocardiogram 1/14/2020 normal LV function grade 1 diastolic dysfunction. Review of Systems   Constitutional: Negative. Negative for chills, fever and unexpected weight change. HENT: Negative. Eyes: Negative. Respiratory: Negative. Negative for shortness of breath. Cardiovascular: Negative. Negative for chest pain. Gastrointestinal: Negative. Negative for diarrhea, nausea and vomiting. Endocrine: Negative. Genitourinary: Negative. Musculoskeletal: Negative. Skin: Negative. Neurological: Negative. All other systems reviewed and are negative. OBJECTIVE:     /88   Pulse 109   Ht 6' (1.829 m)   Wt (!) 379 lb (171.9 kg)   BMI 51.40 kg/m²     Labs:   CBC: No results for input(s): WBC, HGB, HCT, PLT in the last 72 hours. BMP:No results for input(s): NA, K, CO2, BUN, CREATININE, LABGLOM, GLUCOSE in the last 72 hours. BNP: No results for input(s): BNP in the last 72 hours. PT/INR: No results for input(s): PROTIME, INR in the last 72 hours. APTT:No results for input(s): APTT in the last 72 hours. CARDIAC ENZYMES:No results for input(s): CKTOTAL, CKMB, CKMBINDEX, TROPONINI in the last 72 hours. FASTING LIPID PANEL:  Lab Results   Component Value Date    HDL 38 08/17/2020    LDLCALC 80 08/17/2020    TRIG 181 08/17/2020     LIVER PROFILE:No results for input(s): AST, ALT, LABALBU in the last 72 hours.         Past Medical History:   Diagnosis Date    Asthma     Asthma     Chest pain     CHF (congestive heart failure) (HCC)     Colon, diverticulosis     COPD (chronic obstructive pulmonary disease) (HCC)     CPAP (continuous positive airway pressure) dependence     Depression     Diabetes mellitus (HCC)     Fatty liver     GERD (gastroesophageal reflux disease)     Hypokalemia     Hypomagnesemia     Hyponatremia     Hypothyroidism     Insomnia     Kidney stone     Leg cramping     Mass     6.5 x 2.4 complex mass left adnexal region    Neuropathy     Palpitations     Pneumonia     Recurrent UTI     Sepsis (HCC)     SOB (shortness of breath) on exertion     Thyroid disease     Vitamin D deficiency      Past Surgical History:   Procedure Laterality Date     SECTION      x2    CHOLECYSTECTOMY      HYSTERECTOMY      KIDNEY STONE SURGERY      TIBIA FRACTURE SURGERY      TUBAL LIGATION       Family History   Problem Relation Age of Onset    Kidney Disease Mother     Heart Disease Mother     Diabetes Mother     Hypertension Mother     Heart Disease Father      Allergies   Allergen Reactions    Penicillins Rash    Tape [place] Tape] Rash     Current Outpatient Medications   Medication Sig Dispense Refill    Levothyroxine Sodium 125 MCG CAPS Take 125 mcg by mouth daily      Semaglutide, 1 MG/DOSE, (OZEMPIC, 1 MG/DOSE,) 2 MG/1.5ML SOPN 1 mg      metFORMIN (GLUCOPHAGE) 1000 MG tablet Take 1 tablet by mouth 2 times daily (with meals) Hold 48 hours post-cath 60 tablet 3    fluticasone (FLONASE) 50 MCG/ACT nasal spray 2 sprays by Each Nostril route nightly      blood glucose test strips (TRUE METRIX BLOOD GLUCOSE TEST) strip 1 each daily      TRUEPLUS LANCETS 28G MISC 1 each by Does not apply route daily      umeclidinium-vilanterol (ANORO ELLIPTA) 62.5-25 MCG/INH AEPB inhaler Inhale 1 puff into the lungs daily Indications: powder breath activated      levomilnacipran (FETZIMA) 40 MG CP24 extended release capsule Take 40 mg by mouth daily      cromolyn (OPTICROM) 4 % ophthalmic solution 1 drop 4 times daily      Magnesium 500 MG CAPS Take by mouth daily      omeprazole (PRILOSEC) 40 MG delayed release capsule Take 40 mg by mouth daily      vitamin B-12 (CYANOCOBALAMIN) 1000 MCG tablet Take 1,000 mcg by mouth daily      amitriptyline (ELAVIL) 100 MG tablet Take 100 mg by mouth nightly      vitamin C (ASCORBIC ACID) 500 MG tablet Take 500 mg by mouth daily      docusate sodium (COLACE) 100 MG capsule Take 100 mg by mouth daily as needed for Constipation      naproxen (NAPROSYN) 500 MG tablet Take 500 mg by mouth 2 times daily as needed for Pain      potassium chloride (KLOR-CON) 10 MEQ extended release tablet potassium   daily      Cholecalciferol (VITAMIN D) 50 MCG (2000 UT) TABS tablet 2,000 Units daily       LINZESS 72 MCG CAPS capsule 72 mcg every morning (before breakfast)       albuterol sulfate  (90 Base) MCG/ACT inhaler Inhale 2 puffs into the lungs every 4 hours as needed for Wheezing      atorvastatin (LIPITOR) 20 MG tablet Take 20 mg by mouth daily      carvedilol (COREG) 3.125 MG tablet Take 3.125 mg by mouth 2 times daily (with meals)      cetirizine (ZYRTEC) 10 MG tablet Take 10 mg by mouth daily      furosemide (LASIX) 40 MG tablet Take 40 mg by mouth daily      lisinopril-hydrochlorothiazide (PRINZIDE;ZESTORETIC) 20-12.5 MG per tablet Take 1 tablet by mouth daily Takes twice a day      levothyroxine (SYNTHROID) 150 MCG tablet Take 150 mcg by mouth every morning (Patient not taking: Reported on 2/21/2022)      Liraglutide (VICTOZA) 18 MG/3ML SOPN SC injection Inject 1.8 mg into the skin daily  (Patient not taking: Reported on 2/21/2022)       No current facility-administered medications for this visit.      Social History     Socioeconomic History    Marital status: Single     Spouse name: Not on file    Number of children: Not on file    Years of education: Not on file    Highest education level: Not on file   Occupational History    Not on file   Tobacco Use    Smoking status: Never Smoker    Smokeless tobacco: Never Used   Substance and Sexual Activity    Alcohol use: Never    Drug use: Not on file    Sexual activity: Not on file   Other Topics Concern    Not on file   Social History Narrative    Not on file     Social Determinants of Health     Financial Resource Strain:     Difficulty of Paying Living Expenses: Not on file   Food Insecurity:     Worried About Running Out of Food in the Last Year: Not on file    Abhi of Food in the Last Year: Not on file   Transportation Needs:     Lack of Transportation (Medical): Not on file    Lack of Transportation (Non-Medical): Not on file   Physical Activity:     Days of Exercise per Week: Not on file    Minutes of Exercise per Session: Not on file   Stress:     Feeling of Stress : Not on file   Social Connections:     Frequency of Communication with Friends and Family: Not on file    Frequency of Social Gatherings with Friends and Family: Not on file    Attends [employer] Services: Not on file    Active Member of [address] [place] [address] or Organizations: Not on file    Attends Club or Organization Meetings: Not on file    Marital Status: Not on file   Intimate Partner Violence:     Fear of Current or Ex-Partner: Not on file    Emotionally Abused: Not on file    Physically Abused: Not on file    Sexually Abused: Not on file   Housing Stability:     Unable to Pay for Housing in the Last Year: Not on file    Number of [place] in the Last Year: Not on file    Unstable Housing in the Last Year: Not on file       Physical Examination:  /88   Pulse 109   Ht 6' (1.829 m)   Wt (!) 379 lb (171.9 kg)   BMI 51.40 kg/m²   Physical Exam  Vitals reviewed. Constitutional:       Appearance: She is well-developed. Neck:      Vascular: No carotid bruit or JVD.    Cardiovascular:      Rate and Rhythm: Normal rate and regular rhythm. Heart sounds: Normal heart sounds. No murmur heard. No friction rub. No gallop. Pulmonary:      Effort: Pulmonary effort is normal. No respiratory distress. Breath sounds: Normal breath sounds. No wheezing or rales. Abdominal:      General: There is no distension. Tenderness: There is no abdominal tenderness. Lymphadenopathy:      Cervical: No cervical adenopathy. Skin:     General: Skin is warm and dry. ASSESSMENT:     Diagnosis Orders   1. Essential hypertension  EKG 12 lead   2. Mixed hyperlipidemia     3. Abnormal stress test     4. Dyspnea on effort     5. Grade I diastolic dysfunction         PLAN:  Orders Placed This Encounter   Procedures    EKG 12 lead     No orders of the defined types were placed in this encounter. 1. Continue present medications  2. Recommend follow-up assessment in 1 year    Return in about 1 year (around 2/21/2023) for return to Dr. Edu Mcgovern only. Carrillo Giraldo MD 2/21/2022 3:29 PM [facility]      Thisdictation was generated by voice recognition computer software. Although all attempts are made to edit the dictation for accuracy, there may be errors in the transcription that are not intended.

## 2022-03-25 ENCOUNTER — TELEPHONE (OUTPATIENT)
Dept: CARDIOLOGY CLINIC | Age: 57
End: 2022-03-25

## 2022-03-25 NOTE — TELEPHONE ENCOUNTER
Date: ASAP    Cardiologist: Paula    Procedure: R shoulder ORIF vs Reverse TSA    Surgeon: Altagracia Gage    Last Office Visit: 2/21/22  Reason for office visit and medical concerns addressed at this office visit: chf, copd, dm, htn, hyperlipidemia,     Testing Performed and Date of Service:  8/17/20 Cath  Normal LV systolic function. Normal coronaries. Elevated LVEDP 22 mm hg    RCRI = 0.9   METs 4    Current Medications: levothyroxine, metformin, fetzima, omeprazole, amitriptyline, linzess, atorvastatin, coreg, zyrtec, victoza, lisinopril/hctz    Is the patient currently taking an anticoagulant?  If so, what is the diagnosis the patient has been given to warrant the need for the anticoagulant? none3    Additional Notes: requesting cardiac clearance prior to procedure

## 2022-03-30 ENCOUNTER — OFFICE VISIT (OUTPATIENT)
Dept: ORTHOPEDIC SURGERY | Facility: CLINIC | Age: 57
End: 2022-03-30

## 2022-03-30 VITALS — HEIGHT: 72 IN | WEIGHT: 293 LBS | BODY MASS INDEX: 39.68 KG/M2

## 2022-03-30 DIAGNOSIS — S42.291A OTHER CLOSED DISPLACED FRACTURE OF PROXIMAL END OF RIGHT HUMERUS, INITIAL ENCOUNTER: ICD-10-CM

## 2022-03-30 DIAGNOSIS — M79.621 PAIN IN RIGHT UPPER ARM: Primary | ICD-10-CM

## 2022-03-30 PROCEDURE — 99214 OFFICE O/P EST MOD 30 MIN: CPT | Performed by: ORTHOPAEDIC SURGERY

## 2022-03-30 RX ORDER — DULAGLUTIDE 1.5 MG/.5ML
INJECTION, SOLUTION SUBCUTANEOUS
COMMUNITY
End: 2022-09-30

## 2022-03-30 RX ORDER — PIOGLITAZONEHYDROCHLORIDE 15 MG/1
TABLET ORAL
COMMUNITY
Start: 2021-12-08 | End: 2023-01-11 | Stop reason: SDUPTHER

## 2022-03-30 RX ORDER — LEVOMILNACIPRAN HYDROCHLORIDE 40 MG/1
CAPSULE, EXTENDED RELEASE ORAL
COMMUNITY
Start: 2022-03-13 | End: 2023-03-27

## 2022-03-30 RX ORDER — TIZANIDINE HYDROCHLORIDE 4 MG/1
4 CAPSULE, GELATIN COATED ORAL NIGHTLY
COMMUNITY
Start: 2022-03-16 | End: 2022-11-03 | Stop reason: SDUPTHER

## 2022-03-30 RX ORDER — AMITRIPTYLINE HYDROCHLORIDE 100 MG/1
TABLET, FILM COATED ORAL
COMMUNITY
End: 2022-12-28

## 2022-03-30 RX ORDER — GALCANEZUMAB 120 MG/ML
INJECTION, SOLUTION SUBCUTANEOUS
COMMUNITY
Start: 2022-03-24 | End: 2023-01-11 | Stop reason: SDUPTHER

## 2022-03-30 NOTE — PROGRESS NOTES
Janette Chadwick is a 56 y.o. female   Primary provider:  Louisa Meneses APRN       Chief Complaint   Patient presents with   • Right Arm - Pain       HISTORY OF PRESENT ILLNESS: Ms. Chadwick is a 56-year-old right-hand-dominant female the presents orthopedic clinic for a second evaluation for right shoulder injury.  The patient injured her shoulder on 21 March 2022 when she tripped and fell to the wall.  She fell directly onto her right shoulder.  She was initially seen by Dr. Rahman who attempted to obtain a CT scan of her proximal right humerus fracture however due to the patient's body habitus she was unable to obtain the CT scan.  A reverse shoulder arthroplasty was discussed as a treatment option.  She is here today for further evaluation.    Prior shoulder treatments:   []  PT   []  HEP   [x]  Activity modification   []  NSAIDS   []  Subacromial space steroid injection     []  Glenohumeral joint steroid injection   []  Acromioclavicular joint steroid injection  []  Long biceps tendon sheath steroid injection    Pain is chronic  and is severe at times and worse with activity.  Difficulty with ADL's.  Patient is not happy with current quality of life and wants to discuss proceeding with possible surgical intervention.    Pain  This is a new problem. The current episode started 1 to 4 weeks ago. The problem occurs constantly. Associated symptoms include arthralgias and joint swelling. The symptoms are aggravated by exertion. She has tried immobilization, oral narcotics and NSAIDs for the symptoms. The treatment provided no relief.        CONCURRENT MEDICAL HISTORY:    Past Medical History:   Diagnosis Date   • Asthma    • Carpal tunnel syndrome 2020   • CHF (congestive heart failure) ([facility])    • COPD (chronic obstructive pulmonary disease) ([facility])    • Diabetes mellitus ([facility])    • Disease of thyroid gland    • Elevated cholesterol    • Hyperlipidemia    • Hypertension        Allergies   Allergen Reactions   •  Penicillins Rash         Current Outpatient Medications:   •  albuterol sulfate  (90 Base) MCG/ACT inhaler, Inhale 2 puffs Every 4 (Four) Hours As Needed for Wheezing., Disp: , Rfl:   •  amitriptyline (ELAVIL) 100 MG tablet, amitriptyline 100 mg tablet  Take 1 tablet every day by oral route., Disp: , Rfl:   •  atorvastatin (LIPITOR) 20 MG tablet, Take 20 mg by mouth Daily., Disp: , Rfl:   •  carvedilol (COREG) 3.125 MG tablet, Take 3.125 mg by mouth 2 (Two) Times a Day With Meals., Disp: , Rfl:   •  cetirizine (zyrTEC) 10 MG tablet, Take 10 mg by mouth Daily., Disp: , Rfl:   •  cholecalciferol (VITAMIN D3) 1000 units tablet, Take 2,000 Units by mouth Daily., Disp: , Rfl:   •  cromolyn (OPTICROM) 4 % ophthalmic solution, Administer 1 drop to both eyes Daily., Disp: , Rfl:   •  Dulaglutide (Trulicity) 1.5 MG/0.5ML solution pen-injector, Inject  under the skin into the appropriate area as directed., Disp: , Rfl:   •  Emgality 120 MG/ML solution prefilled syringe, , Disp: , Rfl:   •  Fetzima 40 MG capsule sustained-release 24 hr, TAKE ONE CAPSULE BY MOUTH ONCE EVERY DAY, Disp: , Rfl:   •  fluticasone (FLONASE) 50 MCG/ACT nasal spray, 2 sprays into the nostril(s) as directed by provider Daily., Disp: , Rfl:   •  furosemide (LASIX) 40 MG tablet, Take 40 mg by mouth Daily., Disp: , Rfl:   •  levothyroxine (SYNTHROID, LEVOTHROID) 175 MCG tablet, Take 175 mcg by mouth Daily., Disp: , Rfl:   •  LINZESS 72 MCG capsule capsule, Take 72 mcg by mouth Daily., Disp: , Rfl:   •  lisinopril-hydrochlorothiazide (PRINZIDE,ZESTORETIC) 20-12.5 MG per tablet, Take 1 tablet by mouth Daily., Disp: , Rfl:   •  Magnesium Oxide 500 MG tablet, Take 500 mg by mouth Daily., Disp: , Rfl:   •  metFORMIN (GLUCOPHAGE) 1000 MG tablet, Take 1,000 mg by mouth 2 (Two) Times a Day With Meals., Disp: , Rfl:   •  naproxen (NAPROSYN) 500 MG tablet, Take 500 mg by mouth As Needed., Disp: , Rfl:   •  omeprazole (priLOSEC) 40 MG capsule, Take 40 mg by  "mouth Daily., Disp: , Rfl:   •  pioglitazone (ACTOS) 15 MG tablet, pioglitazone 15 mg tablet  Take 1 tablet every day by oral route., Disp: , Rfl:   •  potassium chloride (K-DUR) 10 MEQ CR tablet, Take 10 mEq by mouth Daily., Disp: , Rfl:   •  TiZANidine (ZANAFLEX) 4 MG capsule, Take 4 mg by mouth Every Night., Disp: , Rfl:   •  TRUEplus Lancets 28G misc, , Disp: , Rfl:   •  umeclidinium-vilanterol (ANORO ELLIPTA) 62.5-25 MCG/INH aerosol powder  inhaler, Inhale 1 puff Daily., Disp: , Rfl:   •  vitamin B-12 (CYANOCOBALAMIN) 500 MCG tablet, Take 500 mcg by mouth Daily., Disp: , Rfl:   •  vitamin C (ASCORBIC ACID) 500 MG tablet, Take 500 mg by mouth Daily., Disp: , Rfl:     Past Surgical History:   Procedure Laterality Date   • CHOLECYSTECTOMY     • CHOLECYSTECTOMY     • HYSTERECTOMY     • KIDNEY STONE SURGERY     • TIBIA FRACTURE SURGERY     • TUBAL ABDOMINAL LIGATION         No family history on file.     Social History     Socioeconomic History   • Marital status:    Tobacco Use   • Smoking status: Never Smoker   • Smokeless tobacco: Never Used   Substance and Sexual Activity   • Alcohol use: No   • Drug use: No        Review of Systems   Constitutional: Negative.    HENT: Positive for postnasal drip.    Eyes: Negative.    Respiratory: Negative.    Cardiovascular: Negative.    Gastrointestinal: Negative.    Endocrine: Negative.    Genitourinary: Negative.    Musculoskeletal: Positive for arthralgias and joint swelling.   Skin: Negative.    Allergic/Immunologic: Negative.    Neurological: Negative.    Hematological: Bruises/bleeds easily.   Psychiatric/Behavioral: Negative.        PHYSICAL EXAMINATION:       Ht 182.9 cm (72\")   Wt (!) 155 kg (342 lb)   BMI 46.38 kg/m²     Physical Exam  Constitutional:       General: She is awake.   Neurological:      Mental Status: She is alert.   Psychiatric:         Behavior: Behavior is cooperative.         GAIT:     []  Normal  []  Antalgic    Assistive device: []  " None  []  Walker     []  Crutches  []  Cane     [x]  Wheelchair []  Stretcher    Right Shoulder Exam     Tenderness   The patient is experiencing tenderness in the acromion.    Range of Motion   Active abduction: abnormal   Passive abduction: abnormal   Extension: abnormal   External rotation: abnormal   Forward flexion: abnormal   Internal rotation 0 degrees: abnormal   Internal rotation 90 degrees: abnormal     Muscle Strength   Right shoulder normal muscle strength: She is motor intact to the axillary, musculocutaneous, radial, ulnar, PIN, AIN, and median nerve distributions.    Other   Erythema: absent  Sensation: normal  Pulse: present          NOTABLE DIAGNOSTIC FINDINGS: Radiographs of the right shoulder from March 21, 2022 reviewed and interpreted by myself.  These demonstrate a comminuted intra-articular fracture of the proximal humerus with displacement.  The glenohumeral joint appears to be concentric I personally viewed and interpreted the diagnostics.     ASSESSMENT:    Diagnoses and all orders for this visit:    Pain in right upper arm  -     Ambulatory Referral to Orthopedic Surgery    Other closed displaced fracture of proximal end of right humerus, initial encounter    Other orders  -     Dulaglutide (Trulicity) 1.5 MG/0.5ML solution pen-injector; Inject  under the skin into the appropriate area as directed.  -     pioglitazone (ACTOS) 15 MG tablet; pioglitazone 15 mg tablet   Take 1 tablet every day by oral route.  -     Fetzima 40 MG capsule sustained-release 24 hr; TAKE ONE CAPSULE BY MOUTH ONCE EVERY DAY  -     TiZANidine (ZANAFLEX) 4 MG capsule; Take 4 mg by mouth Every Night.  -     Emgality 120 MG/ML solution prefilled syringe  -     amitriptyline (ELAVIL) 100 MG tablet; amitriptyline 100 mg tablet   Take 1 tablet every day by oral route.      PLAN  The patient has a severely comminuted and displaced proximal femur fracture.  I agree with the original assessment and plan with Dr. Rahman that  the patient would likely benefit from a reverse shoulder arthroplasty.  I do not personally perform the surgery however I did place a consult for the patient to be seen at the [facility] for surgical evaluation.  She should wear the sling as needed and takes breaks when not ambulating to allow for elbow extension as to limit the risk of loss of range of motion at the elbow.  She should be nonweightbearing to her upper extremity on the right.  She can follow-up in orthopedic clinic here on a as needed basis.  Return for Recommended follow-up with orthopaedic trauma or shoulder/elbow surgeon at [facility].    Abner Mckeon DO        This document has been electronically signed by Abner Mckeon DO on March 30, 2022 13:50 CDT    Electronically signed by Abner Mckeon DO on 03/30/22 at 13:50 CDT

## 2022-09-20 ENCOUNTER — TRANSCRIBE ORDERS (OUTPATIENT)
Dept: PHYSICAL THERAPY | Facility: HOSPITAL | Age: 57
End: 2022-09-20

## 2022-09-20 DIAGNOSIS — Z96.611 H/O TOTAL SHOULDER REPLACEMENT, RIGHT: Primary | ICD-10-CM

## 2022-09-27 ENCOUNTER — HOSPITAL ENCOUNTER (OUTPATIENT)
Dept: PHYSICAL THERAPY | Facility: HOSPITAL | Age: 57
Setting detail: THERAPIES SERIES
Discharge: HOME | End: 2022-09-27

## 2022-09-27 DIAGNOSIS — M25.511 RIGHT SHOULDER PAIN, UNSPECIFIED CHRONICITY: ICD-10-CM

## 2022-09-27 DIAGNOSIS — Z96.611 STATUS POST REVERSE TOTAL REPLACEMENT OF RIGHT SHOULDER: Primary | ICD-10-CM

## 2022-09-27 PROCEDURE — 97162 PT EVAL MOD COMPLEX 30 MIN: CPT | Performed by: PHYSICAL THERAPIST

## 2022-09-27 NOTE — THERAPY EVALUATION
Outpatient Physical Therapy Ortho Initial Evaluation  [facility]     Patient Name: Janette Chadwick  : 1965  MRN: [id number]  Today's Date: 2022      Visit Date: 2022     Patient seen for 1 PT sessions.  Patient reports N/A% of improvement.  Next MD appt: 10/26/2022.  Recertification: 10/18/2022.    Therapy Diagnosis: S/P R RTSA (DOS: 22)        Patient Active Problem List   Diagnosis   • Chest pain   • Diabetes mellitus (HCC)   • Hyperlipidemia   • Hypertension   • COPD (chronic obstructive pulmonary disease) ([facility])   • Intractable migraine without aura and with status migrainosus   • Migraine   • Obesity, Class III, BMI 40-49.9 (morbid obesity) ([facility])   • Pain in right upper arm        Past Medical History:   Diagnosis Date   • Arthritis    • Asthma    • Carpal tunnel syndrome    • CHF (congestive heart failure) ([facility])    • COPD (chronic obstructive pulmonary disease) ([facility])    • Diabetes mellitus ([facility])    • Disease of thyroid gland    • Elevated cholesterol    • GERD (gastroesophageal reflux disease)    • Hyperlipidemia    • Hypertension    • Sleep apnea         Past Surgical History:   Procedure Laterality Date   • CHOLECYSTECTOMY     • CHOLECYSTECTOMY     • FRACTURE SURGERY     • HYSTERECTOMY     • JOINT REPLACEMENT     • KIDNEY STONE SURGERY     • TIBIA FRACTURE SURGERY     • TUBAL ABDOMINAL LIGATION         Visit Dx:     ICD-10-CM ICD-9-CM   1. Status post reverse total replacement of right shoulder  Z96.611 V43.61   2. Right shoulder pain, unspecified chronicity  M25.511 719.41          Patient History     Row Name 22 0900             History    Date Current Problem(s) Began 22  -AJ      Brief Description of Current Complaint patient reprots she was walking into her apartment and fell against the wall and broke her shoulder. She reprots she knew something was wrogn when she fell because she couldn't get back up. She reprots she called 911 and the ambulance came and  took her to the hospital and confirmed she had a fracture. She reprots she called and went to [facility] and was told that the surgeon didn't feel comfortable with the surgery.  She repts then she had a consult in [place] but it got canceled due to her insurance not covering [place]. She reprorts then she saw the doctor in [place] and the doctor she saw didn't do the surgery but his colleages did but they were on vacation. patient reprots then she went up to [facility] through the ED and was kept overnight but they were short handed so they di the surgery 2 days later and then dismissed her 2 days later. She reprots they put rods and pins in and then has had more falls and the screws came out of place.  So on June 30th they did a RTSA. She reports she had homeh ealth with [facility] until monday. She reports she just finished with them last week.  -AJ      Previous treatment for THIS PROBLEM Surgery  -      Surgery Date: 06/30/22  -AJ      Patient/Caregiver Goals Relieve pain;Return to prior level of function;Improve mobility;Improve strength;Heal wound  -AJ      Current Tobacco Use None  -AJ      Smoking Status Non-smoker  -AJ      Patient's Rating of General Health Poor  -AJ      Hand Dominance left-handed  -AJ      Occupation/sports/leisure activities Occupation: unemployed; Hobbies: Bingo  -AJ      Patient seeing anyone else for problem(s)? Yes, ortho  -AJ      What clinical tests have you had for this problem? X-ray  -AJ              Pain     Pain Location Shoulder  -AJ      Pain at Present 2  -AJ      Pain at Best 1  -AJ      Pain at Worst 4  -AJ      Pain Frequency Constant/continuous  -AJ      Pain Description Aching  -AJ      What Performance Factors Make the Current Problem(s) WORSE? Movement  -AJ      What Performance Factors Make the Current Problem(s) BETTER? meds  -AJ      Is your sleep disturbed? Yes  sometimes  -AJ      Is medication used to assist with sleep? Yes  -AJ              Fall Risk  Assessment    Any falls in the past year: Yes  -AJ      Number of falls reported in the last 12 months 5  -AJ      Factors that contributed to the fall: Didn't use assistive device  first 4 didn't have assistive device  -AJ      Does patient have a fear of falling Yes (comment)  -AJ              Services    Prior Rehab/Home Health Experiences Yes  -AJ      When was the prior experience with Rehab/Home Health Stopped on 9-20-22  -AJ      Where was the prior experience with Rehab/Home Health [facility]  -AJ      Are you currently receiving Home Health services No  -AJ            User Key  (r) = Recorded By, (t) = Taken By, (c) = Cosigned By    Initials Name Provider Type    Amarilis Lee, PT DPT Physical Therapist                 PT Ortho     Row Name 09/27/22 0900       Subjective Comments    Subjective Comments see history  -AJ       Precautions and Contraindications    Precautions R RTSA  -AJ       Subjective Pain    Able to rate subjective pain? yes  -AJ    Pre-Treatment Pain Level 2  -AJ       Posture/Observations    Alignment Options Forward head;Thoracic kyphosis;Rounded shoulders;Scapular winging  -AJ    Forward Head Moderate;Increased  -AJ    Thoracic Kyphosis Moderate;Increased  -AJ    Rounded Shoulders Bilateral:;Moderate;Increased  -AJ    Scapular winging Bilateral:;Moderate;Increased  -AJ    Posture/Observations Comments No distress, accomanied by daughter, very poor overall posture. No assistive device and unsteady, holds daughter's arm to steady with gait.  -AJ       Shoulder Girdle Accessory Motions    Posterior glide of humerus Right:;Left:;WNL  -AJ    Anterior glide of humerus Right:;Hypomobile;Left:;WNL  -AJ    Inferior glide of humerus Right:;Left:;WNL  -AJ       Shoulder Girdle Palpation    Shoulder Girdle Palpation? --  Anterior shoulder mild tenderness along fibrous feeling incision. Well healed incision.  -AJ       Right Upper Ext    Rt Shoulder Abduction AROM 82°  -AJ    Rt Shoulder  Abduction PROM ~95°  -AJ    Rt Shoulder Flexion AROM 94°  -AJ    Rt Shoulder Flexion PROM ~120°  -AJ    Rt Shoulder External Rotation AROM -10° in scapular plane  -AJ    Rt Shoulder Internal Rotation AROM 40° in scapular plane  -AJ       Left Upper Ext    Lt Shoulder Abduction AROM 145°  -AJ    Lt Shoulder Flexion AROM 135°  -AJ    Lt Shoulder External Rotation AROM 80° @ 90° of shoulder abduction  -AJ    Lt Shoulder Internal Rotation AROM 50° @ 90° of shoulder abduction  -AJ       MMT (Manual Muscle Testing)    General MMT Comments R UE grossly 3-/5, except ER 2+/5  -AJ       Sensation    Sensation WNL? WFL  -AJ    Light Touch No apparent deficits  -AJ       Pathomechanics    Upper Extremity Pathomechanics Excessive scapular elevation;Winging of scapula with elevation;Limited scapular upward rotation;Winging of scapula with return to neutral;Limited thoracic extension  R>L  -AJ       Transfers    Bed-Chair [place] (Transfers) moderate assist (50% patient effort)  daughter assisted  -AJ    Chair-Bed [place] (Transfers) minimum assist (75% patient effort)  daughter assisted  -AJ       Gait/Stairs (Locomotion)    [place] Level (Gait) independent  -AJ    Deviations/Abnormal Patterns (Gait) base of support, wide;ciro decreased;festinating/shuffling;gait speed decreased  Absent arm swing B UEs. Keeps R UE held by side  -AJ    Comment, (Gait/Stairs) Discussed with patient the importance of using her assistive device since she reports so many falls, most of which occured when not using assistive device.  -AJ          User Key  (r) = Recorded By, (t) = Taken By, (c) = Cosigned By    Initials Name Provider Type    Amarilis Lee, PT DPT Physical Therapist                            Therapy Education  Education Details: HEP: all exercises given today  Given: HEP, Symptoms/condition management, Pain management, Posture/body mechanics, Fall prevention and home safety, Other (comment) (POC)  Program:  New  How Provided: Verbal, Demonstration, Written  Provided to: Patient  Level of Understanding: Verbalized, Demonstrated, Teach back education performed      PT OP Goals     Row Name 09/27/22 0900          PT Short Term Goals    STG 1 I with HEP and have additions/changes by next re-certification.  -     STG 2 Patient to be more aware of posture and posture correction technique.  -     STG 3 Patient to have WFL AAROM with 3-way pulley's for improved flexibility.  -     STG 4 AROM R Shoulder flexion >= 110°  -     STG 5 AROM R Shoulder abduction >= 100°  -            Long Term Goals    LTG 1 AROM R Shoulder flexion >= 125°  -     LTG 2 AROM R Shoulder abduction >= 115°  -     LTG 3 AROM R shoulder ER >= 45° at 80-90° of shoulder abduction.  -     LTG 4 AROM R shoulder IR >= 55° at 80-90° of shoulder abduction.  -     LTG 5 R UE >= 4/5 in available range.  -     LTG 6 Patient able to perform 2 minutes of OH activities with no increase in pain.  -     LTG 7 I with final HEP.  -            Time Calculation    PT Goal Re-Cert Due Date 10/18/22  -           User Key  (r) = Recorded By, (t) = Taken By, (c) = Cosigned By    Initials Name Provider Type    Amarilis Lee, PT DPT Physical Therapist              Barriers to Rehab: Include significant or possible arthritic/degenerative changes that have occurred within the joint, The chronicity of this issue, The patient's obesity, The patient's generally deconditioned state.      Safety Issues: Fall risk         PT Assessment/Plan     Row Name 09/27/22 1000          PT Assessment    Functional Limitations Limitation in home management;Limitations in community activities;Limitations in functional capacity and performance;Performance in leisure activities;Performance in self-care ADL  -     Impairments Impaired flexibility;Impaired muscle endurance;Impaired muscle length;Impaired muscle power;Range of motion;Posture;Poor body  mechanics;Pain;Muscle strength;Joint mobility;Joint integrity;Impaired postural alignment  -     Assessment Comments Patient is a morbidly obese 58yo female who presents to the clinci today after having a RTSA 06-30-22 and then resulting home health until 9-20-22. She has snigificant loss of overall ROM, strength, posture, scap stab, and difficulty in her ability to perform even basic ADLS/IADLS. Patient is moving to [place] in 1 week, so will transfer locations after first week of PT. Patient's insurance does not allow treatment to begin on the initial evaluation. Small HEP was established.  Skilled PT with address deficits listed.  -AJ     Please refer to paper survey for additional self-reported information No  -AJ     Rehab Potential Fair  -AJ     Patient/caregiver participated in establishment of treatment plan and goals Yes  -AJ     Patient would benefit from skilled therapy intervention Yes  -AJ            PT Plan    PT Frequency 2x/week  -AJ     Predicted Duration of Therapy Intervention (PT) 16-20 visits  -AJ     Planned CPT's? PT EVAL MOD COMPLELITY: 40598;PT RE-EVAL: 88036;PT THER PROC EA 15 MIN: 19985;PT THER ACT EA 15 MIN: 16548;PT MANUAL THERAPY EA 15 MIN: 76996;PT NEUROMUSC RE-EDUCATION EA 15 MIN: 69067;PT SELF CARE/HOME MGMT/TRAIN EA 15: 09857;PT HOT OR COLD PACK TREAT [facility];PT THER SUPP EA 15 MIN  -AJ     PT Plan Comments progress overall strength, ROM, posture, scp stab, endurance, and function. Patient doing 1 week in [place] before moving to [place] and transfering there.  -AJ           User Key  (r) = Recorded By, (t) = Taken By, (c) = Cosigned By    Initials Name Provider Type    Amarilis Lee, PT DPT Physical Therapist            Other therapeutic activities and/or exercises will be prescribed depending on the patient's progress or lack thereof.       Modalities     Row Name 09/27/22 0900             Ice    Patient reports will apply ice at home to involved area Yes  " -AJ            User Key  (r) = Recorded By, (t) = Taken By, (c) = Cosigned By    Initials Name Provider Type    Amarilis Lee PT DPT Physical Therapist               OP Exercises     Row Name 09/27/22 0900             Subjective Comments    Subjective Comments see history  -AJ              Subjective Pain    Able to rate subjective pain? yes  -AJ      Pre-Treatment Pain Level 2  -AJ              Exercise 1    Exercise Name 1 Pulley's 3-way  -AJ      Additional Comments Demonstrated, not performed, patient has pulley's at home.  -AJ              Exercise 2    Exercise Name 2 Supine wand flexion  -AJ      Sets 2 1  -AJ      Reps 2 10  -AJ      Time 2 5\" hold end of range  -AJ              Exercise 3    Exercise Name 3 Supine wand IR/ER  -AJ      Sets 3 1  -AJ      Reps 3 10  -AJ      Time 3 5\" hold end of range  -AJ              Exercise 4    Exercise Name 4 Sitting wand abduction  -AJ      Sets 4 1  -AJ      Reps 4 10  -AJ      Time 4 5\" hold end of range  -AJ              Exercise 5    Exercise Name 5 Scap Squeezes  -AJ      Sets 5 1  -AJ      Reps 5 10  -AJ      Time 5 5\" hold  -AJ      Additional Comments cues to not engage upper traps  -AJ            User Key  (r) = Recorded By, (t) = Taken By, (c) = Cosigned By    Initials Name Provider Type    Amarilis Lee, GERONIMO DPT Physical Therapist            All therapeutic interventions performed today were to address current functional limitations and/or deficits in addressing all physical therapy goals.                    Outcome Measure Options: Quick DASH  Quick DASH  Open a tight or new jar.: Moderate Difficulty  Do heavy household chores (e.g., wash walls, wash floors): Unable  Carry a shopping bag or briefcase: Severe Difficulty  Wash your back: Unable  Use a knife to cut food: Moderate Difficulty  Recreational activities in which you take some force or impact through your arm, should or hand (e.g. golf, hammering, tennis, etc.): " Unable  During the past week, to what extent has your arm, shoulder, or hand problem interfered with your normal social activites with family, friends, neighbors or groups?: Moderately  During the past week, were you limited in your work or other regular daily activities as a result of your arm, shoulder or hand problem?: Unable  Arm, Shoulder, or hand pain: Moderate  Tingling (pins and needles) in your arm, shoulder, or hand: Moderate  During the past week, how much difficulty have you had sleeping because of the pain in your arm, shoulder or hand?: So much Difficulty that I can't sleep  Number of Questions Answered: 11  Quick DASH Score: 75         Time Calculation:     Start Time: 0915  Stop Time: 1008  Time Calculation (min): 53 min     Therapy Charges for Today     Code Description Service Date Service Provider Modifiers Qty    65575704048 HC PT THER SUPP EA 15 MIN 9/27/2022 Amarilis Miranda, PT DPT GP 1    11245160050 HC PT EVAL MOD COMPLEXITY 4 9/27/2022 Amarilis Miranda, PT DPT GP 1          PT G-Codes  Outcome Measure Options: Quick DASH  Quick DASH Score: 75         This document has been electronically signed by Amarilis Miranda PT DPT, [facility] on September 27, 2022 10:28 CDT

## 2022-09-29 ENCOUNTER — HOSPITAL ENCOUNTER (OUTPATIENT)
Dept: PHYSICAL THERAPY | Facility: HOSPITAL | Age: 57
Setting detail: THERAPIES SERIES
Discharge: HOME | End: 2022-09-29

## 2022-09-29 DIAGNOSIS — Z96.611 STATUS POST REVERSE TOTAL REPLACEMENT OF RIGHT SHOULDER: Primary | ICD-10-CM

## 2022-09-29 DIAGNOSIS — M25.511 RIGHT SHOULDER PAIN, UNSPECIFIED CHRONICITY: ICD-10-CM

## 2022-09-29 PROCEDURE — 97110 THERAPEUTIC EXERCISES: CPT | Performed by: PHYSICAL THERAPIST

## 2022-09-29 NOTE — THERAPY TREATMENT NOTE
Outpatient Physical Therapy Ortho Treatment Note  [facility]     Patient Name: Janette Chadwick  : 1965  MRN: [id number]  Today's Date: 2022      Visit Date: 2022     Patient seen for 2 PT sessions.  Patient reports N/A% of improvement.  Next MD appt: 10/26/2022.  Recertification: 10/18/2022.     Therapy Diagnosis: S/P R RTSA (DOS: 22)    Visit Dx:    ICD-10-CM ICD-9-CM   1. Status post reverse total replacement of right shoulder  Z96.611 V43.61   2. Right shoulder pain, unspecified chronicity  M25.511 719.41       Patient Active Problem List   Diagnosis   • Chest pain   • Diabetes mellitus (HCC)   • Hyperlipidemia   • Hypertension   • COPD (chronic obstructive pulmonary disease) ([facility])   • Intractable migraine without aura and with status migrainosus   • Migraine   • Obesity, Class III, BMI 40-49.9 (morbid obesity) ([facility])   • Pain in right upper arm        Past Medical History:   Diagnosis Date   • Arthritis    • Asthma    • Carpal tunnel syndrome    • CHF (congestive heart failure) ([facility])    • COPD (chronic obstructive pulmonary disease) ([facility])    • Diabetes mellitus ([facility])    • Disease of thyroid gland    • Elevated cholesterol    • GERD (gastroesophageal reflux disease)    • Hyperlipidemia    • Hypertension    • Sleep apnea         Past Surgical History:   Procedure Laterality Date   • CHOLECYSTECTOMY     • CHOLECYSTECTOMY     • FRACTURE SURGERY     • HYSTERECTOMY     • JOINT REPLACEMENT     • KIDNEY STONE SURGERY     • TIBIA FRACTURE SURGERY     • TUBAL ABDOMINAL LIGATION                          PT Assessment/Plan     Row Name 22 1100          PT Assessment    Assessment Comments Patient required minor cues for correct form with HEP exercises. Slow with Pro II but able to complete. Patient had difficulty getting the ER AROm with no $ also.  -            PT Plan    PT Frequency 2x/week  -     PT Plan Comments Add supine ER S next session. Add some manual stretching as  "indicated also.  -AJ           User Key  (r) = Recorded By, (t) = Taken By, (c) = Cosigned By    Initials Name Provider Type    Amarilis Lee, PT DPT Physical Therapist                 Modalities     Row Name 09/29/22 1000             Ice    Ice Applied Yes  -AJ      Location R shoulder  -AJ      PT Ice Rx Minutes 10  -AJ      Ice S/P Rx Yes  -AJ            User Key  (r) = Recorded By, (t) = Taken By, (c) = Cosigned By    Initials Name Provider Type    Amarilis Lee, PT DPT Physical Therapist               OP Exercises     Row Name 09/29/22 1000             Subjective Comments    Subjective Comments Patient reports it is sore today.  -AJ              Subjective Pain    Able to rate subjective pain? yes  -AJ      Pre-Treatment Pain Level 3  -AJ      Post-Treatment Pain Level 3  -AJ              Exercise 1    Exercise Name 1 pro II UE F/R- ROM/posturing  -AJ      Time 1 10 min  -AJ      Additional Comments L 0.0  -AJ              Exercise 2    Exercise Name 2 Pulley's 3-way  -AJ      Time 2 3 min each with 5\" hold ed of range  -AJ              Exercise 3    Exercise Name 3 Seated wand AAROM abduction  -AJ      Sets 3 2  -AJ      Reps 3 10  -AJ      Time 3 3-5\" hold when lifted  -AJ              Exercise 4    Exercise Name 4 Scap squeezes  -AJ      Reps 4 20  -AJ      Time 4 5\" hold  -AJ              Exercise 5    Exercise Name 5 No $  -AJ      Sets 5 1  -AJ      Reps 5 10  -AJ      Time 5 5\" hold  -AJ              Exercise 6    Exercise Name 6 Supine wand flwxion  -AJ      Sets 6 2  -AJ      Reps 6 10  -AJ      Time 6 3-5\" hold OH  -AJ      Additional Comments HOB elevated  -AJ              Exercise 7    Exercise Name 7 Supine wand IR/ER  -AJ      Sets 7 2  -AJ      Reps 7 10  -AJ      Additional Comments HOB elevated  -AJ              Exercise 8    Exercise Name 8 Supine AROM 90° flexion  -AJ      Sets 8 1  -AJ      Reps 8 10  -AJ      Additional Comments HOB elevated  -AJ            User Key  " (r) = Recorded By, (t) = Taken By, (c) = Cosigned By    Initials Name Provider Type    Amarilis Lee, PT DPT Physical Therapist            All therapeutic interventions performed today were to address current functional limitations and/or deficits in addressing all physical therapy goals.                    PT OP Goals     Row Name 09/29/22 1000          PT Short Term Goals    STG 1 I with HEP and have additions/changes by next re-certification.  -     STG 1 Progress Ongoing;Progressing  -     STG 2 Patient to be more aware of posture and posture correction technique.  -     STG 2 Progress Ongoing  -     STG 3 Patient to have WFL AAROM with 3-way pulley's for improved flexibility.  -     STG 3 Progress Ongoing  -     STG 4 AROM R Shoulder flexion >= 110°  -     STG 5 AROM R Shoulder abduction >= 100°  -            Long Term Goals    LTG 1 AROM R Shoulder flexion >= 125°  -     LTG 2 AROM R Shoulder abduction >= 115°  -     LTG 3 AROM R shoulder ER >= 45° at 80-90° of shoulder abduction.  -     LTG 4 AROM R shoulder IR >= 55° at 80-90° of shoulder abduction.  -     LTG 5 R UE >= 4/5 in available range.  -     LTG 6 Patient able to perform 2 minutes of OH activities with no increase in pain.  -     LTG 7 I with final HEP.  -            Time Calculation    PT Goal Re-Cert Due Date 10/18/22  -           User Key  (r) = Recorded By, (t) = Taken By, (c) = Cosigned By    Initials Name Provider Type    Amarilis Lee, PT DPT Physical Therapist                               Time Calculation:   Start Time: 1045  Stop Time: 1138  Time Calculation (min): 53 min  PT Non-Billable Time (min): 10 min  Total Timed Code Minutes- PT: 43 minute(s)  Untimed Charges  PT Ice Rx Minutes: 10  Total Minutes  Untimed Charges Total Minutes: 10   Total Minutes: 10  Therapy Charges for Today     Code Description Service Date Service Provider Modifiers Qty    98420516568 HC PT THER SUPP EA 15  MIN 9/29/2022 Amarilis Miranda, PT DPT GP 1    [id number]  PT THER PROC EA 15 MIN 9/29/2022 Amarilis Miranda, PT DPT GP 3                  This document has been electronically signed by Amarilis Miranda, PT DPT, [facility] on September 29, 2022 11:45 CDT

## 2022-09-30 ENCOUNTER — OFFICE VISIT (OUTPATIENT)
Dept: FAMILY MEDICINE CLINIC | Facility: CLINIC | Age: 57
End: 2022-09-30

## 2022-09-30 VITALS
HEIGHT: 72 IN | DIASTOLIC BLOOD PRESSURE: 78 MMHG | SYSTOLIC BLOOD PRESSURE: 124 MMHG | OXYGEN SATURATION: 96 % | BODY MASS INDEX: 39.68 KG/M2 | RESPIRATION RATE: 26 BRPM | HEART RATE: 123 BPM | WEIGHT: 293 LBS

## 2022-09-30 DIAGNOSIS — Z76.89 ENCOUNTER TO ESTABLISH CARE: Primary | ICD-10-CM

## 2022-09-30 DIAGNOSIS — I10 ESSENTIAL HYPERTENSION: ICD-10-CM

## 2022-09-30 DIAGNOSIS — E78.2 MIXED HYPERLIPIDEMIA: ICD-10-CM

## 2022-09-30 DIAGNOSIS — I73.9 PVD (PERIPHERAL VASCULAR DISEASE): ICD-10-CM

## 2022-09-30 DIAGNOSIS — E11.8 DM (DIABETES MELLITUS), TYPE 2 WITH COMPLICATIONS: ICD-10-CM

## 2022-09-30 DIAGNOSIS — F41.8 MIXED ANXIETY AND DEPRESSIVE DISORDER: ICD-10-CM

## 2022-09-30 DIAGNOSIS — J44.9 CHRONIC OBSTRUCTIVE PULMONARY DISEASE, UNSPECIFIED COPD TYPE: ICD-10-CM

## 2022-09-30 DIAGNOSIS — K21.9 GASTRO-ESOPHAGEAL REFLUX DISEASE WITHOUT ESOPHAGITIS: ICD-10-CM

## 2022-09-30 DIAGNOSIS — E03.9 HYPOTHYROIDISM, UNSPECIFIED TYPE: ICD-10-CM

## 2022-09-30 DIAGNOSIS — I50.9 CONGESTIVE HEART FAILURE, UNSPECIFIED HF CHRONICITY, UNSPECIFIED HEART FAILURE TYPE: ICD-10-CM

## 2022-09-30 DIAGNOSIS — E55.9 VITAMIN D DEFICIENCY: ICD-10-CM

## 2022-09-30 PROBLEM — Z86.69 HISTORY OF SLEEP APNEA: Status: ACTIVE | Noted: 2020-03-12

## 2022-09-30 PROBLEM — G47.33 OBSTRUCTIVE SLEEP APNEA SYNDROME: Status: ACTIVE | Noted: 2022-07-07

## 2022-09-30 PROBLEM — E11.40 DIABETIC NEUROPATHY (HCC): Status: ACTIVE | Noted: 2022-07-07

## 2022-09-30 PROBLEM — K59.00 CONSTIPATION: Status: ACTIVE | Noted: 2022-07-07

## 2022-09-30 PROBLEM — R94.39 ABNORMAL CARDIOVASCULAR STRESS TEST: Status: ACTIVE | Noted: 2021-07-02

## 2022-09-30 PROBLEM — IMO0002 CHRONIC MIGRAINE: Status: ACTIVE | Noted: 2019-06-24

## 2022-09-30 PROBLEM — G47.00 INSOMNIA: Status: ACTIVE | Noted: 2022-07-07

## 2022-09-30 PROBLEM — G43.019 REFRACTORY MIGRAINE WITHOUT AURA: Status: ACTIVE | Noted: 2019-06-24

## 2022-09-30 PROBLEM — M17.9 OSTEOARTHRITIS OF KNEE: Status: ACTIVE | Noted: 2022-07-07

## 2022-09-30 PROBLEM — R51.9 HEADACHE: Status: ACTIVE | Noted: 2019-09-16

## 2022-09-30 PROBLEM — E66.01 SEVERE OBESITY: Status: ACTIVE | Noted: 2020-03-12

## 2022-09-30 PROBLEM — E11.9 TYPE 2 DIABETES MELLITUS WITHOUT COMPLICATION: Status: ACTIVE | Noted: 2022-07-07

## 2022-09-30 PROBLEM — M13.0 POLYARTHRITIS: Status: ACTIVE | Noted: 2022-07-07

## 2022-09-30 PROBLEM — Z78.9 NEVER SMOKED TOBACCO: Status: ACTIVE | Noted: 2019-09-16

## 2022-09-30 PROBLEM — R06.09 DYSPNEA ON EXERTION: Status: ACTIVE | Noted: 2020-06-15

## 2022-09-30 PROBLEM — I51.89 DIASTOLIC DYSFUNCTION: Status: ACTIVE | Noted: 2020-03-12

## 2022-09-30 PROBLEM — E11.65 HYPERGLYCEMIA DUE TO TYPE 2 DIABETES MELLITUS: Status: ACTIVE | Noted: 2022-07-07

## 2022-09-30 PROBLEM — H91.90 HEARING LOSS: Status: ACTIVE | Noted: 2022-07-07

## 2022-09-30 PROCEDURE — 99214 OFFICE O/P EST MOD 30 MIN: CPT | Performed by: NURSE PRACTITIONER

## 2022-09-30 RX ORDER — IBUPROFEN 600 MG/1
TABLET ORAL
COMMUNITY
Start: 2022-07-05 | End: 2022-11-30

## 2022-09-30 RX ORDER — ENOXAPARIN SODIUM 100 MG/ML
INJECTION SUBCUTANEOUS
COMMUNITY
Start: 2022-07-05 | End: 2022-09-30

## 2022-09-30 RX ORDER — DULOXETIN HYDROCHLORIDE 60 MG/1
CAPSULE, DELAYED RELEASE ORAL
COMMUNITY
End: 2022-11-03 | Stop reason: SDUPTHER

## 2022-09-30 RX ORDER — LEVOTHYROXINE SODIUM 137 UG/1
TABLET ORAL
COMMUNITY
Start: 2022-09-05 | End: 2022-09-30 | Stop reason: CLARIF

## 2022-09-30 RX ORDER — LEVOTHYROXINE SODIUM 125 UG/1
125 CAPSULE ORAL
COMMUNITY
End: 2022-09-30

## 2022-09-30 RX ORDER — AMOXICILLIN 250 MG
CAPSULE ORAL
COMMUNITY

## 2022-09-30 RX ORDER — CETIRIZINE HYDROCHLORIDE 10 MG/1
1 TABLET ORAL DAILY
COMMUNITY
End: 2023-01-11

## 2022-09-30 RX ORDER — DILTIAZEM HYDROCHLORIDE 60 MG/1
2 TABLET, FILM COATED ORAL AS NEEDED
COMMUNITY
Start: 2022-09-05

## 2022-09-30 RX ORDER — PROCHLORPERAZINE MALEATE 10 MG
10 TABLET ORAL
COMMUNITY
Start: 2022-06-23

## 2022-09-30 RX ORDER — DIPHENHYDRAMINE HCL 12.5MG/5ML
LIQUID (ML) ORAL
COMMUNITY
End: 2022-09-30

## 2022-09-30 RX ORDER — CROMOLYN SODIUM 40 MG/ML
SOLUTION/ DROPS OPHTHALMIC
COMMUNITY
End: 2022-09-30

## 2022-09-30 RX ORDER — FERROUS SULFATE 325(65) MG
325 TABLET ORAL
COMMUNITY
End: 2023-01-11 | Stop reason: SDUPTHER

## 2022-09-30 RX ORDER — LEVOTHYROXINE SODIUM 0.15 MG/1
TABLET ORAL
COMMUNITY
End: 2022-12-06

## 2022-09-30 RX ORDER — SEMAGLUTIDE 1.34 MG/ML
1 INJECTION, SOLUTION SUBCUTANEOUS
COMMUNITY
End: 2022-09-30

## 2022-09-30 RX ORDER — HYDROCODONE BITARTRATE AND ACETAMINOPHEN 7.5; 325 MG/1; MG/1
TABLET ORAL
COMMUNITY
Start: 2022-07-01 | End: 2022-09-30

## 2022-09-30 RX ORDER — AMITRIPTYLINE HYDROCHLORIDE 100 MG/1
100 TABLET, FILM COATED ORAL
COMMUNITY
Start: 2022-04-01 | End: 2022-09-30 | Stop reason: SDUPTHER

## 2022-09-30 RX ORDER — PHENOL 1.4 %
600 AEROSOL, SPRAY (ML) MUCOUS MEMBRANE DAILY
COMMUNITY

## 2022-09-30 RX ORDER — BLOOD-GLUCOSE METER
KIT MISCELLANEOUS
COMMUNITY
Start: 2022-09-19

## 2022-09-30 RX ORDER — SEMAGLUTIDE 1.34 MG/ML
1 INJECTION, SOLUTION SUBCUTANEOUS WEEKLY
Qty: 3 ML | Refills: 3 | Status: SHIPPED | OUTPATIENT
Start: 2022-09-30 | End: 2022-11-30

## 2022-09-30 RX ORDER — PSEUDOEPHEDRINE HCL 30 MG
TABLET ORAL
COMMUNITY
End: 2022-10-07 | Stop reason: SDUPTHER

## 2022-09-30 RX ORDER — FLUTICASONE PROPIONATE 50 MCG
2 SPRAY, SUSPENSION (ML) NASAL
COMMUNITY
End: 2022-09-30

## 2022-09-30 RX ORDER — ATORVASTATIN CALCIUM 20 MG/1
1 TABLET, FILM COATED ORAL DAILY
COMMUNITY
End: 2022-09-30 | Stop reason: SDUPTHER

## 2022-10-03 DIAGNOSIS — Z12.31 BREAST CANCER SCREENING BY MAMMOGRAM: Primary | ICD-10-CM

## 2022-10-04 ENCOUNTER — HOSPITAL ENCOUNTER (OUTPATIENT)
Dept: PHYSICAL THERAPY | Facility: HOSPITAL | Age: 57
Setting detail: THERAPIES SERIES
Discharge: HOME | End: 2022-10-04

## 2022-10-04 DIAGNOSIS — Z96.611 STATUS POST REVERSE TOTAL REPLACEMENT OF RIGHT SHOULDER: Primary | ICD-10-CM

## 2022-10-04 DIAGNOSIS — M25.511 RIGHT SHOULDER PAIN, UNSPECIFIED CHRONICITY: ICD-10-CM

## 2022-10-04 PROCEDURE — 97110 THERAPEUTIC EXERCISES: CPT

## 2022-10-04 NOTE — THERAPY TREATMENT NOTE
Outpatient Physical Therapy Ortho Treatment Note  [facility]     Patient Name: Janette Chadwick  : 1965  MRN: [id number]  Today's Date: 10/4/2022      Visit Date: 10/04/2022  Subjective Improvement: n/a  MD visit: 10/26/2022  Visit Number: 3/3  Total Approved:12  Recert Date: 10/18/2022  Visit Dx:    ICD-10-CM ICD-9-CM   1. Status post reverse total replacement of right shoulder  Z96.611 V43.61   2. Right shoulder pain, unspecified chronicity  M25.511 719.41       Patient Active Problem List   Diagnosis   • Chest pain   • Diabetes mellitus ([facility])   • Mixed hyperlipidemia   • Essential hypertension   • Chronic obstructive pulmonary disease ([facility])   • Refractory migraine without aura   • Chronic migraine   • Obesity, Class III, BMI 40-49.9 (morbid obesity) ([facility])   • Pain in right upper arm   • Hypothyroidism   • Abnormal cardiovascular stress test   • Congestive heart failure ([facility])   • Constipation   • Diabetic neuropathy ([facility])   • Diastolic dysfunction   • Dyspnea on exertion   • Gastro-esophageal reflux disease without esophagitis   • Headache   • Hearing loss   • Hyperglycemia due to type 2 diabetes mellitus ([facility])   • Insomnia   • Mixed anxiety and depressive disorder   • Never smoked tobacco   • Obstructive sleep apnea syndrome   • Osteoarthritis of knee   • History of sleep apnea   • Severe obesity ([facility])   • Polyarthritis   • Type 2 diabetes mellitus without complication ([facility])   • Vitamin D deficiency        Past Medical History:   Diagnosis Date   • Arthritis    • Asthma    • Carpal tunnel syndrome    • CHF (congestive heart failure) ([facility])    • COPD (chronic obstructive pulmonary disease) ([facility])    • Diabetes mellitus ([facility])    • Disease of thyroid gland    • Elevated cholesterol    • GERD (gastroesophageal reflux disease)    • Hyperlipidemia    • Hypertension    • Sleep apnea         Past Surgical History:   Procedure Laterality Date   • CHOLECYSTECTOMY     • CHOLECYSTECTOMY     • FRACTURE  SURGERY     • HYSTERECTOMY     • JOINT REPLACEMENT     • KIDNEY STONE SURGERY     • TIBIA FRACTURE SURGERY     • TUBAL ABDOMINAL LIGATION          PT Ortho     Row Name 10/04/22 1400       Precautions and Contraindications    Precautions R RTSA  -TL       Subjective Pain    Able to rate subjective pain? yes  -TL    Pre-Treatment Pain Level 2  -TL    Post-Treatment Pain Level --  Pt did not give rating  -TL          User Key  (r) = Recorded By, (t) = Taken By, (c) = Cosigned By    Initials Name Provider Type    Mer Truong PTA Physical Therapist Assistant                             PT Assessment/Plan     Row Name 10/04/22 1400          PT Assessment    Assessment Comments Pt came in 19 mins late for treatment. Treatment short this date. Pt and caregiver went to the hospital vs outpatient clinic. PTA treatment focused on review of HEP with pt secondary to pt has not been doing HEP since last PT visit.  Pt reports she fatigues easily. pt came into clinic without assisted device.  No new goals met at this time. Will start new ex next visit. pt did require few cues on HEP this date.  -TL            PT Plan    PT Frequency 2x/week  -TL     Predicted Duration of Therapy Intervention (PT) 16-20 visits  -TL     PT Plan Comments add supine ER stretch next visit and measure ROM  -TL           User Key  (r) = Recorded By, (t) = Taken By, (c) = Cosigned By    Initials Name Provider Type    Mer Truong PTA Physical Therapist Assistant                 Modalities     Row Name 10/04/22 1400             Ice    Ice Applied Yes  -TL      Location right shoulder  -TL      Ice S/P Rx Yes  -TL            User Key  (r) = Recorded By, (t) = Taken By, (c) = Cosigned By    Initials Name Provider Type    Mer Truong PTA Physical Therapist Assistant               OP Exercises     Row Name 10/04/22 1400             Subjective Comments    Subjective Comments pt reports that she has not done any of her exercises  secondary to moving. pt reports pulleys packed .  Pt came into clinic 19mins late for appt.  -TL              Subjective Pain    Able to rate subjective pain? yes  -TL      Pre-Treatment Pain Level 2  -TL      Post-Treatment Pain Level --  Pt did not give rating  -TL              Exercise 1    Exercise Name 1 pro II UE F/R- ROM/posturing  -TL      Time 1 10 mins  -TL              Exercise 2    Exercise Name 2 Pulley's 3-way  -TL      Cueing 2 Verbal;Tactile  Pt required cues for proper techigue each directions  -TL      Time 2 2 mins secondary to pt came in late.  -TL      Additional Comments review with pt and caregiver.  -TL              Exercise 3    Exercise Name 3 seated shld abd  -TL      Sets 3 2  -TL      Reps 3 10  -TL              Exercise 4    Exercise Name 4 ice  -TL      Time 4 10 min  -TL            User Key  (r) = Recorded By, (t) = Taken By, (c) = Cosigned By    Initials Name Provider Type    Mer Truong, PTA Physical Therapist Assistant                              PT OP Goals     Row Name 10/04/22 1400          PT Short Term Goals    STG 1 I with HEP and have additions/changes by next re-certification.  -TL     STG 1 Progress Ongoing;Progressing  -TL     STG 2 Patient to be more aware of posture and posture correction technique.  -TL     STG 2 Progress Ongoing  -TL     STG 3 Patient to have WFL AAROM with 3-way pulley's for improved flexibility.  -TL     STG 3 Progress Ongoing  -TL     STG 4 AROM R Shoulder flexion >= 110°  -TL     STG 5 AROM R Shoulder abduction >= 100°  -TL            Long Term Goals    LTG 1 AROM R Shoulder flexion >= 125°  -TL     LTG 2 AROM R Shoulder abduction >= 115°  -TL     LTG 3 AROM R shoulder ER >= 45° at 80-90° of shoulder abduction.  -TL     LTG 4 AROM R shoulder IR >= 55° at 80-90° of shoulder abduction.  -TL     LTG 5 R UE >= 4/5 in available range.  -TL     LTG 6 Patient able to perform 2 minutes of OH activities with no increase in pain.  -TL     LTG 7 I  with final HEP.  -TL            Time Calculation    PT Goal Re-Cert Due Date 10/18/22  -TL           User Key  (r) = Recorded By, (t) = Taken By, (c) = Cosigned By    Initials Name Provider Type    Mer Truong PTA Physical Therapist Assistant                               Time Calculation:   Start Time: 1404  Stop Time: 1440  Time Calculation (min): 36 min  PT Non-Billable Time (min): 10 min  Therapy Charges for Today     Code Description Service Date Service Provider Modifiers Qty    13933783395 HC PT THER PROC EA 15 MIN 10/4/2022 Mer Michelle PTA GP, CQ 2                    Mer Michelle PTA  10/4/2022

## 2022-10-05 DIAGNOSIS — M79.605 PAIN IN BOTH LOWER EXTREMITIES: Primary | ICD-10-CM

## 2022-10-05 DIAGNOSIS — M79.604 PAIN IN BOTH LOWER EXTREMITIES: Primary | ICD-10-CM

## 2022-10-05 DIAGNOSIS — I77.1 STRICTURE OF ARTERY: ICD-10-CM

## 2022-10-07 ENCOUNTER — TELEPHONE (OUTPATIENT)
Dept: FAMILY MEDICINE CLINIC | Facility: CLINIC | Age: 57
End: 2022-10-07

## 2022-10-07 RX ORDER — PSEUDOEPHEDRINE HCL 30 MG
100 TABLET ORAL DAILY
Qty: 90 EACH | Refills: 0 | Status: SHIPPED | OUTPATIENT
Start: 2022-10-07

## 2022-10-07 NOTE — TELEPHONE ENCOUNTER
----- Message from Janette Chadwick sent at 10/7/2022  3:29 PM CDT -----  Regarding: Colace  Hi I need colace refilled plse It didn't have any refills left on it thanks

## 2022-10-11 ENCOUNTER — HOSPITAL ENCOUNTER (OUTPATIENT)
Dept: PHYSICAL THERAPY | Facility: HOSPITAL | Age: 57
Setting detail: THERAPIES SERIES
Discharge: HOME | End: 2022-10-11

## 2022-10-11 DIAGNOSIS — Z96.611 STATUS POST REVERSE TOTAL REPLACEMENT OF RIGHT SHOULDER: Primary | ICD-10-CM

## 2022-10-11 PROCEDURE — 97110 THERAPEUTIC EXERCISES: CPT

## 2022-10-11 NOTE — THERAPY TREATMENT NOTE
Outpatient Physical Therapy Ortho Treatment Note  [facility]     Patient Name: Janette Chadwick  : 1965  MRN: [id number]  Today's Date: 10/11/2022      Visit Date: 10/11/2022  Subjective Improvement: n/a  MD visit: 10/26/2022  Visit Number: 44  Total Approved:12  Recert Date: 10/18/2022  Visit Dx:    ICD-10-CM ICD-9-CM   1. Status post reverse total replacement of right shoulder  Z96.611 V43.61       Patient Active Problem List   Diagnosis   • Chest pain   • Diabetes mellitus ([facility])   • Mixed hyperlipidemia   • Essential hypertension   • Chronic obstructive pulmonary disease ([facility])   • Refractory migraine without aura   • Chronic migraine   • Obesity, Class III, BMI 40-49.9 (morbid obesity) ([facility])   • Pain in right upper arm   • Hypothyroidism   • Abnormal cardiovascular stress test   • Congestive heart failure ([facility])   • Constipation   • Diabetic neuropathy ([facility])   • Diastolic dysfunction   • Dyspnea on exertion   • Gastro-esophageal reflux disease without esophagitis   • Headache   • Hearing loss   • Hyperglycemia due to type 2 diabetes mellitus ([facility])   • Insomnia   • Mixed anxiety and depressive disorder   • Never smoked tobacco   • Obstructive sleep apnea syndrome   • Osteoarthritis of knee   • History of sleep apnea   • Severe obesity ([facility])   • Polyarthritis   • Type 2 diabetes mellitus without complication ([facility])   • Vitamin D deficiency        Past Medical History:   Diagnosis Date   • Arthritis    • Asthma    • Carpal tunnel syndrome    • CHF (congestive heart failure) ([facility])    • COPD (chronic obstructive pulmonary disease) ([facility])    • Diabetes mellitus ([facility])    • Disease of thyroid gland    • Elevated cholesterol    • GERD (gastroesophageal reflux disease)    • Hyperlipidemia    • Hypertension    • Sleep apnea         Past Surgical History:   Procedure Laterality Date   • CHOLECYSTECTOMY     • CHOLECYSTECTOMY     • FRACTURE SURGERY     • HYSTERECTOMY     • JOINT REPLACEMENT     • KIDNEY STONE  SURGERY     • TIBIA FRACTURE SURGERY     • TUBAL ABDOMINAL LIGATION          PT Ortho     Row Name 10/11/22 1300       Subjective Comments    Subjective Comments pt reports not doing much of her HEP secondary moving. Pt reports now moved in her home.  -TL       Precautions and Contraindications    Precautions R RTSA  -TL       Subjective Pain    Able to rate subjective pain? yes  -TL    Pre-Treatment Pain Level 2  -TL    Post-Treatment Pain Level 0  -TL       Right Upper Ext    Rt Shoulder Abduction AROM 85  -TL    Rt Shoulder Flexion AROM 95  -TL          User Key  (r) = Recorded By, (t) = Taken By, (c) = Cosigned By    Initials Name Provider Type    Mer Truong PTA Physical Therapist Assistant                             PT Assessment/Plan     Row Name 10/11/22 1400          PT Assessment    Assessment Comments Pt at 16 weeks this week. Pt very limited with ROM. Pt reports not able to do HEP due to moving. Pt is very limited mobility.  PTA encourage pt to do HEP. PTA given pt a set of pulleys to be working on at home along aarom ROM ex with wand. Pt gave good effort today .  Pt very tight pec muscles. Pt with deep scaring.  No new goals met  -TL        PT Plan    PT Frequency 2x/week  -TL     Predicted Duration of Therapy Intervention (PT) 16-20 visits  -TL     PT Plan Comments Push ROM and stretches.  -TL           User Key  (r) = Recorded By, (t) = Taken By, (c) = Cosigned By    Initials Name Provider Type    Mer Truong PTA Physical Therapist Assistant                 Modalities     Row Name 10/11/22 1300             Ice    Ice Applied Yes  -TL      Location right shoulder  -TL      Ice S/P Rx Yes  -TL            User Key  (r) = Recorded By, (t) = Taken By, (c) = Cosigned By    Initials Name Provider Type    Mer Truong PTA Physical Therapist Assistant               OP Exercises     Row Name 10/11/22 1300             Subjective Comments    Subjective Comments pt reports not doing much  of her HEP secondary moving. Pt reports now moved in her home.  -TL         Subjective Pain    Able to rate subjective pain? yes  -TL      Pre-Treatment Pain Level 2  -TL      Post-Treatment Pain Level 0  -TL         Exercise 1    Exercise Name 1 pro II UE F/R- ROM/posturing  -TL      Time 1 10 mins  -TL         Exercise 2    Exercise Name 2 pulley's 3 way  -TL      Cueing 2 Verbal;Tactile  -TL      Time 2 2 mins each directions  -TL         Exercise 3    Exercise Name 3 arom shld flex  -TL      Sets 3 2  -TL      Reps 3 10  -TL      Additional Comments seated  -TL         Exercise 4    Exercise Name 4 arom seated shld abd  -TL      Sets 4 2  -TL      Reps 4 10  -TL         Exercise 5    Exercise Name 5 supine wand shld flexion ex  -TL      Sets 5 2  -TL      Reps 5 10  -TL         Exercise 6    Exercise Name 6 PROM  -TL      Time 6 10 mins  -TL         Exercise 7    Exercise Name 7 manual  -TL            User Key  (r) = Recorded By, (t) = Taken By, (c) = Cosigned By    Initials Name Provider Type    Mer Truong PTA Physical Therapist Assistant                         Manual Rx (last 36 hours)     Manual Treatments     Row Name 10/11/22 1500             Manual Rx 1    Manual Rx 1 Location pec min  -TL      Manual Rx 1 Grade opposing thumbs  -TL         Manual Rx 2    Manual Rx 2 Location deltoids  -TL      Manual Rx 2 Grade pincer, STM  -TL         Manual Rx 3    Manual Rx 3 Location corcobrachalis  -TL      Manual Rx 3 Grade pincer  -TL            User Key  (r) = Recorded By, (t) = Taken By, (c) = Cosigned By    Initials Name Provider Type    Mer Truong PTA Physical Therapist Assistant                 PT OP Goals     Row Name 10/11/22 1400 10/11/22 1300       PT Short Term Goals    STG 1 I with HEP and have additions/changes by next re-certification.  -TL I with HEP and have additions/changes by next re-certification.  -TL    STG 1 Progress Ongoing;Progressing  -TL Ongoing;Progressing  -TL    STG  2 Patient to be more aware of posture and posture correction technique.  -TL Patient to be more aware of posture and posture correction technique.  -TL    STG 2 Progress Ongoing  -TL Ongoing  -TL    STG 3 Patient to have WFL AAROM with 3-way pulley's for improved flexibility.  -TL Patient to have WFL AAROM with 3-way pulley's for improved flexibility.  -TL    STG 3 Progress Ongoing  -TL Ongoing  -TL    STG 4 AROM R Shoulder flexion >= 110°  -TL AROM R Shoulder flexion >= 110°  -TL    STG 4 Progress Ongoing  -TL --    STG 5 AROM R Shoulder abduction >= 100°  -TL AROM R Shoulder abduction >= 100°  -TL    STG 5 Progress Ongoing  -TL --       Long Term Goals    LTG 1 AROM R Shoulder flexion >= 125°  -TL AROM R Shoulder flexion >= 125°  -TL    LTG 2 AROM R Shoulder abduction >= 115°  -TL AROM R Shoulder abduction >= 115°  -TL    LTG 3 AROM R shoulder ER >= 45° at 80-90° of shoulder abduction.  -TL AROM R shoulder ER >= 45° at 80-90° of shoulder abduction.  -TL    LTG 4 AROM R shoulder IR >= 55° at 80-90° of shoulder abduction.  -TL AROM R shoulder IR >= 55° at 80-90° of shoulder abduction.  -TL    LTG 5 R UE >= 4/5 in available range.  -TL R UE >= 4/5 in available range.  -TL    LTG 6 Patient able to perform 2 minutes of OH activities with no increase in pain.  -TL Patient able to perform 2 minutes of OH activities with no increase in pain.  -TL    LTG 7 I with final HEP.  -TL I with final HEP.  -TL       Time Calculation    PT Goal Re-Cert Due Date 10/18/22  -TL 10/18/22  -TL          User Key  (r) = Recorded By, (t) = Taken By, (c) = Cosigned By    Initials Name Provider Type    Mer Truong PTA Physical Therapist Assistant                Therapy Education  Education Details: Pulleys 3-way, wand ex flex/abd, arom flex/abd this date  Given: HEP, Symptoms/condition management, Pain management  Program: New, Reinforced  How Provided: Verbal, Demonstration, Written  Provided to: Patient  Level of Understanding:  Teach back education performed, Verbalized, Demonstrated              Time Calculation:   Start Time: 1344  Stop Time: 1445  Time Calculation (min): 61 min  PT Non-Billable Time (min): 15 min  Therapy Charges for Today     Code Description Service Date Service Provider Modifiers Qty    81759307185 HC PT THER PROC EA 15 MIN 10/11/2022 Mer Michelle PTA GP, CQ 3                    Mer Michelle PTA  10/11/2022

## 2022-10-13 ENCOUNTER — HOSPITAL ENCOUNTER (OUTPATIENT)
Dept: PHYSICAL THERAPY | Facility: HOSPITAL | Age: 57
Setting detail: THERAPIES SERIES
Discharge: HOME | End: 2022-10-13

## 2022-10-13 DIAGNOSIS — Z96.611 STATUS POST REVERSE TOTAL REPLACEMENT OF RIGHT SHOULDER: Primary | ICD-10-CM

## 2022-10-13 DIAGNOSIS — M25.511 RIGHT SHOULDER PAIN, UNSPECIFIED CHRONICITY: ICD-10-CM

## 2022-10-13 PROCEDURE — 97110 THERAPEUTIC EXERCISES: CPT

## 2022-10-13 NOTE — THERAPY TREATMENT NOTE
Outpatient Physical Therapy Ortho Treatment Note  [facility]     Patient Name: Janette Chadwick  : 1965  MRN: [id number]  Today's Date: 10/13/2022      Visit Date: 10/13/2022  Subjective Improvement: n/a  MD visit: 10/26/2022  Visit Number: 55  Total Approved:12  Recert Date: 10/18/2022  Visit Dx:    ICD-10-CM ICD-9-CM   1. Status post reverse total replacement of right shoulder  Z96.611 V43.61   2. Right shoulder pain, unspecified chronicity  M25.511 719.41       Patient Active Problem List   Diagnosis   • Chest pain   • Diabetes mellitus ([facility])   • Mixed hyperlipidemia   • Essential hypertension   • Chronic obstructive pulmonary disease ([facility])   • Refractory migraine without aura   • Chronic migraine   • Obesity, Class III, BMI 40-49.9 (morbid obesity) ([facility])   • Pain in right upper arm   • Hypothyroidism   • Abnormal cardiovascular stress test   • Congestive heart failure ([facility])   • Constipation   • Diabetic neuropathy ([facility])   • Diastolic dysfunction   • Dyspnea on exertion   • Gastro-esophageal reflux disease without esophagitis   • Headache   • Hearing loss   • Hyperglycemia due to type 2 diabetes mellitus ([facility])   • Insomnia   • Mixed anxiety and depressive disorder   • Never smoked tobacco   • Obstructive sleep apnea syndrome   • Osteoarthritis of knee   • History of sleep apnea   • Severe obesity ([facility])   • Polyarthritis   • Type 2 diabetes mellitus without complication ([facility])   • Vitamin D deficiency        Past Medical History:   Diagnosis Date   • Arthritis    • Asthma    • Carpal tunnel syndrome    • CHF (congestive heart failure) ([facility])    • COPD (chronic obstructive pulmonary disease) ([facility])    • Diabetes mellitus ([facility])    • Disease of thyroid gland    • Elevated cholesterol    • GERD (gastroesophageal reflux disease)    • Hyperlipidemia    • Hypertension    • Sleep apnea         Past Surgical History:   Procedure Laterality Date   • CHOLECYSTECTOMY     • CHOLECYSTECTOMY     • FRACTURE  SURGERY     • HYSTERECTOMY     • JOINT REPLACEMENT     • KIDNEY STONE SURGERY     • TIBIA FRACTURE SURGERY     • TUBAL ABDOMINAL LIGATION          PT Ortho     Row Name 10/13/22 1400       Subjective Comments    Subjective Comments Pt reported that her recliner broke when she was trying to get up and fell. pt reported that she did not hurt herself. pt did report that she was sore yesterday but better today.  -TL       Precautions and Contraindications    Precautions R RTSA  -TL       Subjective Pain    Able to rate subjective pain? yes  -TL    Pre-Treatment Pain Level 3  -TL          User Key  (r) = Recorded By, (t) = Taken By, (c) = Cosigned By    Initials Name Provider Type    Mer Truong PTA Physical Therapist Assistant                             PT Assessment/Plan     Row Name 10/13/22 1500          PT Assessment    Assessment Comments Pt unable to touch the back of her head with the right UE. pt limited with ER and ext of the shld as well as flex/abd. Pt tolerated resist row mid with YTB and bicep curls with #1 weight for strengthening. Encourage pt to push ROM at home.  -TL        PT Plan    PT Frequency 2x/week  -TL     Predicted Duration of Therapy Intervention (PT) 16-20 visits  -TL     PT Plan Comments add standing pec S with rotation  -TL           User Key  (r) = Recorded By, (t) = Taken By, (c) = Cosigned By    Initials Name Provider Type    Mer Truong PTA Physical Therapist Assistant                   OP Exercises     Row Name 10/13/22 1400             Subjective Comments    Subjective Comments Pt reported that her recliner broke when she was trying to get up and fell. pt reported that she did not hurt herself. pt did report that she was sore yesterday but better today.  -TL         Subjective Pain    Able to rate subjective pain? yes  -TL      Pre-Treatment Pain Level 3  -TL         Exercise 1    Exercise Name 1 pro II UE F/R- ROM/posturing  -TL      Time 1 5 mins fwd/5 back  -TL          Exercise 2    Exercise Name 2 standing shld ext wand ex  -TL      Sets 2 2  -TL      Reps 2 10  -TL         Exercise 3    Exercise Name 3 seated wand shld flex  -TL      Sets 3 3  -TL      Reps 3 10  -TL         Exercise 4    Exercise Name 4 seated wand shld abd ex  -TL      Sets 4 3  -TL      Reps 4 10  -TL         Exercise 5    Exercise Name 5 bicep curls  -TL      Sets 5 3  -TL      Reps 5 10  -TL      Additional Comments #1right UE, #2 left  -TL         Exercise 6    Exercise Name 6 chicken wings  -TL      Sets 6 3  -TL      Reps 6 10  -TL         Exercise 7    Exercise Name 7 arom shld flex  -TL      Sets 7 3  -TL      Reps 7 10  -TL         Exercise 8    Exercise Name 8 seated shld rows mid YTB  -TL      Sets 8 2  -TL      Reps 8 10  -TL         Exercise 9    Exercise Name 9 shld abd arom  -TL      Sets 9 2  -TL      Reps 9 10  -TL         Exercise 10    Exercise Name 10 salute  -TL      Sets 10 2  -TL      Reps 10 10  -TL            User Key  (r) = Recorded By, (t) = Taken By, (c) = Cosigned By    Initials Name Provider Type    TL Mer Michelle PTA Physical Therapist Assistant                                 Therapy Education  Education Details: shld row mid YTB seated, bicep curls with soup cans, chicken wings, salute  Given: HEP, Symptoms/condition management, Posture/body mechanics  Program: New, Reinforced  How Provided: Verbal, Demonstration, Written  Provided to: Patient  Level of Understanding: Teach back education performed, Verbalized, Demonstrated              Time Calculation:   Start Time: 1430  Stop Time: 1540  Time Calculation (min): 70 min  PT Non-Billable Time (min): 10 min  Therapy Charges for Today     Code Description Service Date Service Provider Modifiers Qty    20416633974 HC PT THER PROC EA 15 MIN 10/13/2022 Mer Michelle PTA GP, CQ 4                    Mer Michelle PTA  10/13/2022

## 2022-10-18 ENCOUNTER — HOSPITAL ENCOUNTER (OUTPATIENT)
Dept: PHYSICAL THERAPY | Facility: HOSPITAL | Age: 57
Setting detail: THERAPIES SERIES
Discharge: HOME | End: 2022-10-18

## 2022-10-18 ENCOUNTER — OFFICE VISIT (OUTPATIENT)
Dept: CARDIAC SURGERY | Facility: CLINIC | Age: 57
End: 2022-10-18

## 2022-10-18 VITALS
HEART RATE: 120 BPM | WEIGHT: 293 LBS | OXYGEN SATURATION: 97 % | HEIGHT: 72 IN | DIASTOLIC BLOOD PRESSURE: 70 MMHG | BODY MASS INDEX: 39.68 KG/M2 | SYSTOLIC BLOOD PRESSURE: 136 MMHG

## 2022-10-18 DIAGNOSIS — M25.511 RIGHT SHOULDER PAIN, UNSPECIFIED CHRONICITY: ICD-10-CM

## 2022-10-18 DIAGNOSIS — R22.43 LOCALIZED SWELLING OF BOTH LOWER LEGS: ICD-10-CM

## 2022-10-18 DIAGNOSIS — E08.41 DIABETIC MONONEUROPATHY ASSOCIATED WITH DIABETES MELLITUS DUE TO UNDERLYING CONDITION: ICD-10-CM

## 2022-10-18 DIAGNOSIS — E78.2 MIXED HYPERLIPIDEMIA: ICD-10-CM

## 2022-10-18 DIAGNOSIS — I10 ESSENTIAL HYPERTENSION: ICD-10-CM

## 2022-10-18 DIAGNOSIS — I73.9 PVD (PERIPHERAL VASCULAR DISEASE) WITH CLAUDICATION: Primary | ICD-10-CM

## 2022-10-18 DIAGNOSIS — Z96.611 STATUS POST REVERSE TOTAL REPLACEMENT OF RIGHT SHOULDER: Primary | ICD-10-CM

## 2022-10-18 DIAGNOSIS — E66.01 MORBID OBESITY WITH BMI OF 50.0-59.9, ADULT: ICD-10-CM

## 2022-10-18 PROCEDURE — G0283 ELEC STIM OTHER THAN WOUND: HCPCS | Performed by: PHYSICAL THERAPIST

## 2022-10-18 PROCEDURE — 97110 THERAPEUTIC EXERCISES: CPT | Performed by: PHYSICAL THERAPIST

## 2022-10-18 PROCEDURE — 99215 OFFICE O/P EST HI 40 MIN: CPT | Performed by: NURSE PRACTITIONER

## 2022-10-18 RX ORDER — ASPIRIN 81 MG/1
81 TABLET ORAL DAILY
Qty: 30 TABLET | Refills: 11 | Status: SHIPPED | OUTPATIENT
Start: 2022-10-18 | End: 2023-10-18

## 2022-10-18 NOTE — THERAPY TREATMENT NOTE
Outpatient Physical Therapy Ortho Treatment Note  [facility]     Patient Name: Janette Chadwick  : 1965  MRN: [id number]  Today's Date: 10/18/2022      Visit Date: 10/18/2022  Subjective Improvement: n/a  MD visit: 10/26/2022  Visit Number: 66  Total Approved:12  Recert Date: 10/18/2022      Visit Dx:    ICD-10-CM ICD-9-CM   1. Status post reverse total replacement of right shoulder  Z96.611 V43.61   2. Right shoulder pain, unspecified chronicity  M25.511 719.41       Patient Active Problem List   Diagnosis   • Chest pain   • Diabetes mellitus ([facility])   • Mixed hyperlipidemia   • Essential hypertension   • Chronic obstructive pulmonary disease ([facility])   • Refractory migraine without aura   • Chronic migraine   • Obesity, Class III, BMI 40-49.9 (morbid obesity) ([facility])   • Pain in right upper arm   • Hypothyroidism   • Abnormal cardiovascular stress test   • Congestive heart failure ([facility])   • Constipation   • Diabetic neuropathy ([facility])   • Diastolic dysfunction   • Dyspnea on exertion   • Gastro-esophageal reflux disease without esophagitis   • Headache   • Hearing loss   • Hyperglycemia due to type 2 diabetes mellitus ([facility])   • Insomnia   • Mixed anxiety and depressive disorder   • Never smoked tobacco   • Obstructive sleep apnea syndrome   • Osteoarthritis of knee   • History of sleep apnea   • Severe obesity ([facility])   • Polyarthritis   • Type 2 diabetes mellitus without complication ([facility])   • Vitamin D deficiency   • PVD (peripheral vascular disease) with claudication ([facility])   • Localized swelling of both lower legs        Past Medical History:   Diagnosis Date   • Arthritis    • Asthma    • Carpal tunnel syndrome    • CHF (congestive heart failure) ([facility])    • COPD (chronic obstructive pulmonary disease) ([facility])    • Diabetes mellitus ([facility])    • Disease of thyroid gland    • Elevated cholesterol    • GERD (gastroesophageal reflux disease)    • Hyperlipidemia    • Hypertension    • Sleep apnea         Past  Surgical History:   Procedure Laterality Date   • CHOLECYSTECTOMY     • CHOLECYSTECTOMY     • FRACTURE SURGERY     • HYSTERECTOMY     • JOINT REPLACEMENT     • KIDNEY STONE SURGERY     • TIBIA FRACTURE SURGERY     • TUBAL ABDOMINAL LIGATION          PT Ortho     Row Name 10/18/22 1437       Precautions and Contraindications    Precautions R RTSA  -BS       Subjective Pain    Able to rate subjective pain? yes  -BS    Pre-Treatment Pain Level 3  -BS          User Key  (r) = Recorded By, (t) = Taken By, (c) = Cosigned By    Initials Name Provider Type    Bola Pablo, PT Physical Therapist                             PT Assessment/Plan     Row Name 10/18/22 1437          PT Assessment    Assessment Comments Good tolerance to estim (IFC) for the first time. Occasional cues required to correctly execute AAROM ex's with the dowel for the R shoulder. Most limited in abduction direction.  -BS        PT Plan    PT Frequency 2x/week  -BS     Predicted Duration of Therapy Intervention (PT) 16-20 visits  -BS     PT Plan Comments continue to push ROM as able. Re-cert next session.  -BS           User Key  (r) = Recorded By, (t) = Taken By, (c) = Cosigned By    Initials Name Provider Type    Bola Pablo, PT Physical Therapist                 Modalities     Row Name 10/18/22 1437             Ice    Ice Applied Yes  -BS      Location right shoulder  -BS      PT Ice Rx Minutes 15  -BS      Ice S/P Rx Yes  -BS         ELECTRICAL STIMULATION    Attended/Unattended Unattended  -BS      Stimulation Type IFC  -BS      Location/Electrode Placement/Other R shoulder  -BS      PT E-Stim Unattended Minutes 15  -BS            User Key  (r) = Recorded By, (t) = Taken By, (c) = Cosigned By    Initials Name Provider Type    Bola Pablo, PT Physical Therapist               OP Exercises     Row Name 10/18/22 1437             Subjective Comments    Subjective Comments Patient with no reports.  -BS         Subjective Pain    Able to  rate subjective pain? yes  -BS      Pre-Treatment Pain Level 3  -BS      Post-Treatment Pain Level 3  -BS         Exercise 1    Exercise Name 1 pro II UE F/R- ROM/posturing  -BS      Time 1 5 mins fwd/5 back  -BS         Exercise 2    Exercise Name 2 pulley's 3 way  -BS      Time 2 5-6 mins total  -BS         Exercise 3    Exercise Name 3 AAROM supine R shoulder flex  -BS      Sets 3 2  -BS      Reps 3 10  -BS         Exercise 4    Exercise Name 4 AAROM supine R shoulder abduction  -BS      Sets 4 2  -BS      Reps 4 10  -BS         Exercise 5    Exercise Name 5 bicep curls  -BS      Sets 5 2  -BS      Reps 5 15  -BS      Additional Comments 1# db  -BS         Exercise 6    Exercise Name 6 PROM with clinician  -BS         Exercise 7    Exercise Name 7 see modalities  -BS            User Key  (r) = Recorded By, (t) = Taken By, (c) = Cosigned By    Initials Name Provider Type    BS Bola Dumont, PT Physical Therapist                              PT OP Goals     Row Name 10/18/22 1500 10/18/22 1437       PT Short Term Goals    STG 1 -- I with HEP and have additions/changes by next re-certification.  -BS    STG 1 Progress -- Ongoing;Progressing  -BS    STG 2 -- Patient to be more aware of posture and posture correction technique.  -BS    STG 2 Progress -- Ongoing  -BS    STG 3 -- Patient to have WFL AAROM with 3-way pulley's for improved flexibility.  -BS    STG 3 Progress -- Ongoing  -BS    STG 4 -- AROM R Shoulder flexion >= 110°  -BS    STG 4 Progress -- Ongoing  -BS    STG 5 -- AROM R Shoulder abduction >= 100°  -BS    STG 5 Progress -- Ongoing  -BS       Long Term Goals    LTG 1 -- AROM R Shoulder flexion >= 125°  -BS    LTG 2 -- AROM R Shoulder abduction >= 115°  -BS    LTG 3 -- AROM R shoulder ER >= 45° at 80-90° of shoulder abduction.  -BS    LTG 4 -- AROM R shoulder IR >= 55° at 80-90° of shoulder abduction.  -BS    LTG 5 -- R UE >= 4/5 in available range.  -BS    LTG 6 -- Patient able to perform 2 minutes of  OH activities with no increase in pain.  -BS    LTG 7 -- I with final HEP.  -BS       Time Calculation    PT Goal Re-Cert Due Date 10/18/22  -BS 10/18/22  -BS          User Key  (r) = Recorded By, (t) = Taken By, (c) = Cosigned By    Initials Name Provider Type    BS Bola Dumont, PT Physical Therapist                               Time Calculation:   Start Time: 1437  Stop Time: 1543  Time Calculation (min): 66 min  PT Non-Billable Time (min): 15 min  Total Timed Code Minutes- PT: 51 minute(s)  Untimed Charges  PT E-Stim Unattended Minutes: 15  PT Ice Rx Minutes: 15  Total Minutes  Untimed Charges Total Minutes: 30   Total Minutes: 30  Therapy Charges for Today     Code Description Service Date Service Provider Modifiers Qty    29798779426 HC PT THER PROC EA 15 MIN 10/18/2022 Bola Dumont, PT GP 3    45502056133 HC PT ELECTRICAL STIM UNATTENDED 10/18/2022 Bola Dumont, PT  1                    Bola Dumont, PT  10/18/2022

## 2022-10-18 NOTE — PATIENT INSTRUCTIONS
mild, moderate reduction Arterial Flow bilateral Lower Extremity  with claudication  Medical Management: add ASA,cont STATIN   If signs and symptoms of ischemia should occur including but not limited to pale/blue discoloration of limb, increasing pain with ambulation or at rest, or a non-healing wound. Patient is to notify [facility] for immediate evaluation.   Return as scheduled- Stress MARCO ANTONIO, BLE Venous     Your BMI is Body mass index is 52.49 kg/m². and falls within  Obese Class III extreme obesity: > or equal to 40kg/m2. BMI is strongly correlated with increased health risks. Review options for weight management, heart healthy diet, and exercise programs.

## 2022-10-19 ENCOUNTER — TELEPHONE (OUTPATIENT)
Dept: FAMILY MEDICINE CLINIC | Facility: CLINIC | Age: 57
End: 2022-10-19

## 2022-10-20 ENCOUNTER — HOSPITAL ENCOUNTER (OUTPATIENT)
Dept: PHYSICAL THERAPY | Facility: HOSPITAL | Age: 57
Setting detail: THERAPIES SERIES
Discharge: HOME | End: 2022-10-20

## 2022-10-20 DIAGNOSIS — Z96.611 STATUS POST REVERSE TOTAL REPLACEMENT OF RIGHT SHOULDER: Primary | ICD-10-CM

## 2022-10-20 DIAGNOSIS — M25.511 RIGHT SHOULDER PAIN, UNSPECIFIED CHRONICITY: ICD-10-CM

## 2022-10-20 PROCEDURE — 97110 THERAPEUTIC EXERCISES: CPT | Performed by: PHYSICAL THERAPIST

## 2022-10-20 PROCEDURE — G0283 ELEC STIM OTHER THAN WOUND: HCPCS | Performed by: PHYSICAL THERAPIST

## 2022-10-21 NOTE — THERAPY PROGRESS REPORT/RE-CERT
Outpatient Physical Therapy Ortho Progress Note  [facility]     Patient Name: Janette Chadwick  : 1965  MRN: [id number]  Today's Date: 10/20/2022      Visit Date: 10/20/2022  Subjective Improvement: some  MD visit: 10/26/2022  Visit Number: 77  Total Approved:12  Recert Date: 11/10/2022    Visit Dx:    ICD-10-CM ICD-9-CM   1. Status post reverse total replacement of right shoulder  Z96.611 V43.61   2. Right shoulder pain, unspecified chronicity  M25.511 719.41       Patient Active Problem List   Diagnosis   • Chest pain   • Diabetes mellitus ([facility])   • Mixed hyperlipidemia   • Essential hypertension   • Chronic obstructive pulmonary disease ([facility])   • Refractory migraine without aura   • Chronic migraine   • Obesity, Class III, BMI 40-49.9 (morbid obesity) ([facility])   • Pain in right upper arm   • Hypothyroidism   • Abnormal cardiovascular stress test   • Congestive heart failure ([facility])   • Constipation   • Diabetic neuropathy ([facility])   • Diastolic dysfunction   • Dyspnea on exertion   • Gastro-esophageal reflux disease without esophagitis   • Headache   • Hearing loss   • Hyperglycemia due to type 2 diabetes mellitus ([facility])   • Insomnia   • Mixed anxiety and depressive disorder   • Never smoked tobacco   • Obstructive sleep apnea syndrome   • Osteoarthritis of knee   • History of sleep apnea   • Severe obesity ([facility])   • Polyarthritis   • Type 2 diabetes mellitus without complication ([facility])   • Vitamin D deficiency   • PVD (peripheral vascular disease) with claudication ([facility])   • Localized swelling of both lower legs        Past Medical History:   Diagnosis Date   • Arthritis    • Asthma    • Carpal tunnel syndrome    • CHF (congestive heart failure) ([facility])    • COPD (chronic obstructive pulmonary disease) ([facility])    • Diabetes mellitus ([facility])    • Disease of thyroid gland    • Elevated cholesterol    • GERD (gastroesophageal reflux disease)    • Hyperlipidemia    • Hypertension    • Sleep apnea         Past  Surgical History:   Procedure Laterality Date   • CHOLECYSTECTOMY     • CHOLECYSTECTOMY     • FRACTURE SURGERY     • HYSTERECTOMY     • JOINT REPLACEMENT     • KIDNEY STONE SURGERY     • TIBIA FRACTURE SURGERY     • TUBAL ABDOMINAL LIGATION          PT Ortho     Row Name 10/20/22 1313       Subjective Comments    Subjective Comments Patient seeing Dr. Bernard in 6 days. 13 minutes late after having a very busy morning with the grandkids.  -BS       Precautions and Contraindications    Precautions R RTSA  -BS       Subjective Pain    Able to rate subjective pain? yes  -BS    Pre-Treatment Pain Level 3  -BS    Post-Treatment Pain Level 3  -BS       Right Upper Ext    Rt Shoulder Abduction AROM 95 (AAROM)  -BS    Rt Shoulder Flexion AROM 120 (AAROM)  -BS    Rt Shoulder External Rotation AROM 22 (AAROM)  -BS          User Key  (r) = Recorded By, (t) = Taken By, (c) = Cosigned By    Initials Name Provider Type    Bola Pablo, PT Physical Therapist                             PT Assessment/Plan     Row Name 10/20/22 7182          PT Assessment    Assessment Comments Slowly progressing toward goals due to complicated recovery postoperatively. Limited by pain, muscle guarding and weakness.  -BS        PT Plan    PT Frequency 2x/week  -BS     Predicted Duration of Therapy Intervention (PT) 16-20 visits  -BS     PT Plan Comments await MD orders on the 26th to initiate strengthening.  -BS           User Key  (r) = Recorded By, (t) = Taken By, (c) = Cosigned By    Initials Name Provider Type    Bola Pablo, PT Physical Therapist                 Modalities     Row Name 10/20/22 7683             Ice    Ice Applied Yes  -BS      Location right shoulder  -BS      PT Ice Rx Minutes 15  -BS      Ice S/P Rx Yes  -BS         ELECTRICAL STIMULATION    Attended/Unattended Unattended  -BS      Stimulation Type IFC  -BS      Location/Electrode Placement/Other R shoulder  -BS      PT E-Stim Unattended Minutes 15  -BS            User  Key  (r) = Recorded By, (t) = Taken By, (c) = Cosigned By    Initials Name Provider Type    Bola Pablo, PT Physical Therapist               OP Exercises     Row Name 10/20/22 1313             Subjective Comments    Subjective Comments Patient seeing Dr. Bernard in 6 days. 13 minutes late after having a very busy morning with the gustavo.  -BS         Subjective Pain    Able to rate subjective pain? yes  -BS      Pre-Treatment Pain Level 3  -BS      Post-Treatment Pain Level 3  -BS         Exercise 1    Exercise Name 1 pro II UE F- ROM/posturing  -BS      Time 1 5' F only  -BS         Exercise 2    Exercise Name 2 supine R shoulder ER/IR w/ dowel  -BS      Sets 2 1  -BS      Reps 2 20  -BS      Additional Comments AAROM  -BS         Exercise 3    Exercise Name 3 AAROM supine R shoulder flex  -BS      Sets 3 1  -BS      Reps 3 20  -BS         Exercise 4    Exercise Name 4 AAROM supine R shoulder abduction  -BS      Sets 4 1  -BS      Reps 4 20  -BS         Exercise 5    Exercise Name 5 chest press w/ dowel  -BS      Sets 5 2  -BS      Reps 5 10  -BS      Additional Comments vc's and tc's  -BS         Exercise 6    Exercise Name 6 PROM with clinician, R shoulder  -BS      Time 6 5'  -BS         Exercise 7    Exercise Name 7 see modalities  -BS            User Key  (r) = Recorded By, (t) = Taken By, (c) = Cosigned By    Initials Name Provider Type    Bola Pablo, PT Physical Therapist                              PT OP Goals     Row Name 10/20/22 1313 10/20/22 1300       PT Short Term Goals    STG Date to Achieve 11/03/22  -BS --    STG 1 I with HEP and have additions/changes by next re-certification.  -BS --    STG 1 Progress Ongoing;Progressing  -BS --    STG 2 Patient to be more aware of posture and posture correction technique.  -BS --    STG 2 Progress Ongoing  -BS --    STG 3 Patient to have WFL AAROM with 3-way pulley's for improved flexibility.  -BS --    STG 3 Progress Ongoing  -BS --    STG 4 AROM R  Shoulder flexion >= 110°  -BS --    STG 4 Progress Ongoing  -BS --    STG 5 AROM R Shoulder abduction >= 100°  -BS --    STG 5 Progress Ongoing  -BS --       Long Term Goals    LTG Date to Achieve 11/17/22  -BS --    LTG 1 AROM R Shoulder flexion >= 125°  -BS --    LTG 2 AROM R Shoulder abduction >= 115°  -BS --    LTG 3 AROM R shoulder ER >= 45° at 80-90° of shoulder abduction.  -BS --    LTG 4 AROM R shoulder IR >= 55° at 80-90° of shoulder abduction.  -BS --    LTG 5 R UE >= 4/5 in available range.  -BS --    LTG 6 Patient able to perform 2 minutes of OH activities with no increase in pain.  -BS --    LTG 7 I with final HEP.  -BS --       Time Calculation    PT Goal Re-Cert Due Date --  -BS 11/10/22  -BS          User Key  (r) = Recorded By, (t) = Taken By, (c) = Cosigned By    Initials Name Provider Type    Bola Pablo, PT Physical Therapist                               Time Calculation:   Start Time: 1313  Stop Time: 1410  Time Calculation (min): 57 min  PT Non-Billable Time (min): 15 min  Total Timed Code Minutes- PT: 42 minute(s)  Untimed Charges  PT E-Stim Unattended Minutes: 15  PT Ice Rx Minutes: 15  Total Minutes  Untimed Charges Total Minutes: 30   Total Minutes: 30  Therapy Charges for Today     Code Description Service Date Service Provider Modifiers Qty    88541283991 HC PT THER PROC EA 15 MIN 10/20/2022 Bola Dumont PT GP 2    01032195745 HC PT THER PROC EA 15 MIN 10/20/2022 Bola Dumont, PT GP 1    69589630970 HC PT ELECTRICAL STIM UNATTENDED 10/20/2022 Bola Dumont, PT  1                    Bola Dumont PT  10/20/2022

## 2022-10-24 ENCOUNTER — HOSPITAL ENCOUNTER (OUTPATIENT)
Dept: PHYSICAL THERAPY | Facility: HOSPITAL | Age: 57
Setting detail: THERAPIES SERIES
Discharge: HOME | End: 2022-10-24

## 2022-10-24 DIAGNOSIS — M25.511 RIGHT SHOULDER PAIN, UNSPECIFIED CHRONICITY: ICD-10-CM

## 2022-10-24 DIAGNOSIS — Z96.611 STATUS POST REVERSE TOTAL REPLACEMENT OF RIGHT SHOULDER: Primary | ICD-10-CM

## 2022-10-24 PROCEDURE — 97110 THERAPEUTIC EXERCISES: CPT

## 2022-10-24 PROCEDURE — G0283 ELEC STIM OTHER THAN WOUND: HCPCS

## 2022-10-24 NOTE — THERAPY TREATMENT NOTE
Outpatient Physical Therapy Ortho Treatment Note  [facility]     Patient Name: Janette Chadwick  : 1965  MRN: [id number]  Today's Date: 10/24/2022      Visit Date: 10/24/2022  Subjective Improvement: 45%  MD visit: 10/26/2022  Visit Number: 88  Total Approved:12  Recert Date: 11/10/2022  Visit Dx:    ICD-10-CM ICD-9-CM   1. Status post reverse total replacement of right shoulder  Z96.611 V43.61   2. Right shoulder pain, unspecified chronicity  M25.511 719.41       Patient Active Problem List   Diagnosis   • Chest pain   • Diabetes mellitus ([facility])   • Mixed hyperlipidemia   • Essential hypertension   • Chronic obstructive pulmonary disease ([facility])   • Refractory migraine without aura   • Chronic migraine   • Obesity, Class III, BMI 40-49.9 (morbid obesity) ([facility])   • Pain in right upper arm   • Hypothyroidism   • Abnormal cardiovascular stress test   • Congestive heart failure ([facility])   • Constipation   • Diabetic neuropathy ([facility])   • Diastolic dysfunction   • Dyspnea on exertion   • Gastro-esophageal reflux disease without esophagitis   • Headache   • Hearing loss   • Hyperglycemia due to type 2 diabetes mellitus ([facility])   • Insomnia   • Mixed anxiety and depressive disorder   • Never smoked tobacco   • Obstructive sleep apnea syndrome   • Osteoarthritis of knee   • History of sleep apnea   • Severe obesity ([facility])   • Polyarthritis   • Type 2 diabetes mellitus without complication ([facility])   • Vitamin D deficiency   • PVD (peripheral vascular disease) with claudication ([facility])   • Localized swelling of both lower legs        Past Medical History:   Diagnosis Date   • Arthritis    • Asthma    • Carpal tunnel syndrome    • CHF (congestive heart failure) ([facility])    • COPD (chronic obstructive pulmonary disease) ([facility])    • Diabetes mellitus ([facility])    • Disease of thyroid gland    • Elevated cholesterol    • GERD (gastroesophageal reflux disease)    • Hyperlipidemia    • Hypertension    • Sleep apnea         Past  Surgical History:   Procedure Laterality Date   • CHOLECYSTECTOMY     • CHOLECYSTECTOMY     • FRACTURE SURGERY     • HYSTERECTOMY     • JOINT REPLACEMENT     • KIDNEY STONE SURGERY     • TIBIA FRACTURE SURGERY     • TUBAL ABDOMINAL LIGATION          PT Ortho     Row Name 10/24/22 1100       Subjective Comments    Subjective Comments Pt reports doing good with HEP  -TL       Precautions and Contraindications    Precautions R RTSA  -TL       Subjective Pain    Able to rate subjective pain? yes  -TL    Pre-Treatment Pain Level 2  -TL          User Key  (r) = Recorded By, (t) = Taken By, (c) = Cosigned By    Initials Name Provider Type    Mer Truong PTA Physical Therapist Assistant                             PT Assessment/Plan     Row Name 10/24/22 1100          PT Assessment    Assessment Comments No new goals met at time. Pt goes to the doctor tommorrow. pt doing well. Pt moving better. No strength started until MD gives okay at this time. Focused on more arom in seated position this date.  -TL        PT Plan    PT Frequency 2x/week  -TL     Predicted Duration of Therapy Intervention (PT) 16-20 weeks  -TL     PT Plan Comments waiting for new order strengthening.  -TL           User Key  (r) = Recorded By, (t) = Taken By, (c) = Cosigned By    Initials Name Provider Type    Mer Truong PTA Physical Therapist Assistant                 Modalities     Row Name 10/24/22 1100             Ice    Ice Applied Yes  -TL      Location right shoulder  -TL      Ice S/P Rx Yes  -TL         ELECTRICAL STIMULATION    Attended/Unattended Unattended  -TL      Stimulation Type IFC  -TL      Location/Electrode Placement/Other R shoulder  -TL      PT E-Stim Unattended Minutes 15  -TL            User Key  (r) = Recorded By, (t) = Taken By, (c) = Cosigned By    Initials Name Provider Type    Mer Truong PTA Physical Therapist Assistant               OP Exercises     Row Name 10/24/22 1100             Subjective  Comments    Subjective Comments Pt reports doing good with HEP  -TL         Subjective Pain    Able to rate subjective pain? yes  -TL      Pre-Treatment Pain Level 2  -TL         Exercise 1    Exercise Name 1 pro ll strengthening  -TL      Time 1 5 mins fwd/ 5 mins back  -TL      Additional Comments 3.5 level  -TL         Exercise 2    Exercise Name 2 Pulley's- 3 way  -TL      Reps 2 20 each direction  -TL         Exercise 3    Exercise Name 3 seated arom shoulder flexion R  -TL      Sets 3 2  -TL      Reps 3 10  -TL         Exercise 4    Exercise Name 4 arom shld abd ex seated  -TL      Sets 4 2  -TL      Reps 4 10  -TL         Exercise 5    Exercise Name 5 chicken wing  -TL      Reps 5 20  -TL         Exercise 6    Exercise Name 6 PROM with clinician, R shoulder  -TL      Time 6 8 mins  -TL         Exercise 7    Exercise Name 7 semi-reclined wand shld flex  -TL      Reps 7 20  -TL            User Key  (r) = Recorded By, (t) = Taken By, (c) = Cosigned By    Initials Name Provider Type    TL Mer Michelle, PTA Physical Therapist Assistant                              PT OP Goals     Row Name 10/24/22 1100          PT Short Term Goals    STG Date to Achieve 11/03/22  -TL     STG 1 I with HEP and have additions/changes by next re-certification.  -TL     STG 1 Progress Ongoing;Progressing  -TL     STG 2 Patient to be more aware of posture and posture correction technique.  -TL     STG 2 Progress Ongoing  -TL     STG 3 Patient to have WFL AAROM with 3-way pulley's for improved flexibility.  -TL     STG 3 Progress Ongoing  -TL     STG 4 AROM R Shoulder flexion >= 110°  -TL     STG 4 Progress Ongoing  -TL     STG 5 AROM R Shoulder abduction >= 100°  -TL     STG 5 Progress Ongoing  -TL        Long Term Goals    LTG Date to Achieve 11/17/22  -TL     LTG 1 AROM R Shoulder flexion >= 125°  -TL     LTG 2 AROM R Shoulder abduction >= 115°  -TL     LTG 3 AROM R shoulder ER >= 45° at 80-90° of shoulder abduction.  -TL     LTG  4 AROM R shoulder IR >= 55° at 80-90° of shoulder abduction.  -TL     LTG 5 R UE >= 4/5 in available range.  -TL     LTG 6 Patient able to perform 2 minutes of OH activities with no increase in pain.  -TL     LTG 7 I with final HEP.  -TL        Time Calculation    PT Goal Re-Cert Due Date 11/10/22  -TL           User Key  (r) = Recorded By, (t) = Taken By, (c) = Cosigned By    Initials Name Provider Type    Mer Truong PTA Physical Therapist Assistant                               Time Calculation:   Untimed Charges  PT E-Stim Unattended Minutes: 15  Total Minutes  Untimed Charges Total Minutes: 15   Total Minutes: 15  Therapy Charges for Today     Code Description Service Date Service Provider Modifiers Qty    73162571819 HC PT ELECTRICAL STIM UNATTENDED 10/24/2022 Mer Michelle PTA CQ 1    78783737444 HC PT THER PROC EA 15 MIN 10/24/2022 Mer Michelle PTA GP, CQ 3                    Mer Michelle PTA  10/24/2022

## 2022-10-25 ENCOUNTER — TELEPHONE (OUTPATIENT)
Dept: FAMILY MEDICINE CLINIC | Facility: CLINIC | Age: 57
End: 2022-10-25

## 2022-10-26 ENCOUNTER — TELEPHONE (OUTPATIENT)
Dept: FAMILY MEDICINE CLINIC | Facility: CLINIC | Age: 57
End: 2022-10-26

## 2022-10-27 ENCOUNTER — HOSPITAL ENCOUNTER (OUTPATIENT)
Dept: PHYSICAL THERAPY | Facility: HOSPITAL | Age: 57
Setting detail: THERAPIES SERIES
Discharge: HOME | End: 2022-10-27

## 2022-10-27 ENCOUNTER — TELEPHONE (OUTPATIENT)
Dept: FAMILY MEDICINE CLINIC | Facility: CLINIC | Age: 57
End: 2022-10-27

## 2022-10-27 DIAGNOSIS — M25.511 RIGHT SHOULDER PAIN, UNSPECIFIED CHRONICITY: ICD-10-CM

## 2022-10-27 DIAGNOSIS — Z96.611 STATUS POST REVERSE TOTAL REPLACEMENT OF RIGHT SHOULDER: Primary | ICD-10-CM

## 2022-10-27 PROCEDURE — 97110 THERAPEUTIC EXERCISES: CPT

## 2022-10-27 PROCEDURE — G0283 ELEC STIM OTHER THAN WOUND: HCPCS

## 2022-10-27 NOTE — TELEPHONE ENCOUNTER
I called the [facility] to have them refax José Miguel pt's diabetic footwear documentation request

## 2022-10-27 NOTE — THERAPY TREATMENT NOTE
Outpatient Physical Therapy Ortho Treatment Note  [facility]     Patient Name: Janette Chadwick  : 1965  MRN: [id number]  Today's Date: 10/27/2022      Visit Date: 10/27/2022  Subjective Improvement: 45%  MD visit: MAKAYLA  Visit Number:   Total Approved:12  Recert Date: 11/10/2022  Visit Dx:    ICD-10-CM ICD-9-CM   1. Status post reverse total replacement of right shoulder  Z96.611 V43.61   2. Right shoulder pain, unspecified chronicity  M25.511 719.41       Patient Active Problem List   Diagnosis   • Chest pain   • Diabetes mellitus ([facility]ly [facility])   • Mixed hyperlipidemia   • Essential hypertension   • Chronic obstructive pulmonary disease ([facility]ly [facility])   • Refractory migraine without aura   • Chronic migraine   • Morbid obesity with BMI of 50.0-59.9, adult (Formerly [facility])   • Pain in right upper arm   • Hypothyroidism   • Abnormal cardiovascular stress test   • Congestive heart failure ([facility]ly [facility])   • Constipation   • Diabetic neuropathy ([facility])   • Diastolic dysfunction   • Dyspnea on exertion   • Gastro-esophageal reflux disease without esophagitis   • Headache   • Hearing loss   • Hyperglycemia due to type 2 diabetes mellitus ([facility])   • Insomnia   • Mixed anxiety and depressive disorder   • Never smoked tobacco   • Obstructive sleep apnea syndrome   • Osteoarthritis of knee   • History of sleep apnea   • Severe obesity ([facility])   • Polyarthritis   • Type 2 diabetes mellitus without complication ([facility])   • Vitamin D deficiency   • PVD (peripheral vascular disease) with claudication ([facility])   • Localized swelling of both lower legs        Past Medical History:   Diagnosis Date   • Arthritis    • Asthma    • Carpal tunnel syndrome    • CHF (congestive heart failure) (Formerly [facility])    • COPD (chronic obstructive pulmonary disease) ([facility])    • Diabetes mellitus ([facility])    • Disease of thyroid gland    • Elevated cholesterol    • GERD (gastroesophageal reflux disease)    • Hyperlipidemia    • Hypertension    • Sleep apnea         Past Surgical  History:   Procedure Laterality Date   • CHOLECYSTECTOMY     • CHOLECYSTECTOMY     • FRACTURE SURGERY     • HYSTERECTOMY     • JOINT REPLACEMENT     • KIDNEY STONE SURGERY     • TIBIA FRACTURE SURGERY     • TUBAL ABDOMINAL LIGATION          PT Ortho     Row Name 10/27/22 1400       Subjective Comments    Subjective Comments pt reports that the doctor pleased with progress. Pt reports that the doctor did not want IR behind back at all.  -TL       Precautions and Contraindications    Precautions R RTSA  -TL       Subjective Pain    Able to rate subjective pain? yes  -TL    Pre-Treatment Pain Level 2  -TL    Post-Treatment Pain Level 0  -TL          User Key  (r) = Recorded By, (t) = Taken By, (c) = Cosigned By    Initials Name Provider Type    Mer Truong PTA Physical Therapist Assistant                             PT Assessment/Plan     Row Name 10/27/22 1400          PT Assessment    Assessment Comments pt continues to make progress with ROM to right shld and overall health. Pt went to the doctor yesterday and was pleased with pts progress. Pt wants to not have pt reach behind back. PTA spoke with therapist about calling doctor to see when strengthening can begin. Doctor did not fax or give pt back progress note .  -TL        PT Plan    PT Frequency 2x/week  -TL     Predicted Duration of Therapy Intervention (PT) 16-20 visits  -TL     PT Plan Comments measure next visit. THerapist to call for strengthening orders  -TL           User Key  (r) = Recorded By, (t) = Taken By, (c) = Cosigned By    Initials Name Provider Type    Mer Truong PTA Physical Therapist Assistant                 Modalities     Row Name 10/27/22 1400             Ice    Ice Applied Yes  -TL      Location right shoulder  -TL      Ice S/P Rx Yes  -TL         ELECTRICAL STIMULATION    Attended/Unattended Unattended  -TL      Stimulation Type IFC  -TL      Location/Electrode Placement/Other right shld  -TL      PT E-Stim Unattended  Minutes 15  -TL            User Key  (r) = Recorded By, (t) = Taken By, (c) = Cosigned By    Initials Name Provider Type    Mer Truong PTA Physical Therapist Assistant               OP Exercises     Row Name 10/27/22 1400             Subjective Comments    Subjective Comments pt reports that the doctor pleased with progress. Pt reports that the doctor did not want IR behind back at all.  -TL         Subjective Pain    Able to rate subjective pain? yes  -TL      Pre-Treatment Pain Level 2  -TL      Post-Treatment Pain Level 0  -TL         Exercise 1    Exercise Name 1 pro ll strengthening  -TL      Time 1 5 fwd/5 back  -TL      Additional Comments 3.5  -TL         Exercise 2    Exercise Name 2 Pulley's- 3 way  -TL      Time 2 2 mins  -TL         Exercise 3    Exercise Name 3 chicken wing  -TL      Reps 3 20  -TL         Exercise 4    Exercise Name 4 shld flexion arom in seated position  -TL      Sets 4 2  -TL      Reps 4 10  -TL         Exercise 5    Exercise Name 5 shld arom abd seated  -TL      Sets 5 2  -TL      Reps 5 10  -TL         Exercise 6    Exercise Name 6 standing wall slides flexion  -TL      Sets 6 2  -TL      Reps 6 10  -TL         Exercise 7    Exercise Name 7 PROM right shld  -TL      Time 7 8 mins  -TL            User Key  (r) = Recorded By, (t) = Taken By, (c) = Cosigned By    Initials Name Provider Type    Mer Truong PTA Physical Therapist Assistant                              PT OP Goals     Row Name 10/27/22 1400          PT Short Term Goals    STG Date to Achieve 11/03/22  -TL     STG 1 I with HEP and have additions/changes by next re-certification.  -TL     STG 1 Progress Ongoing;Progressing  -TL     STG 2 Patient to be more aware of posture and posture correction technique.  -TL     STG 2 Progress Ongoing  -TL     STG 3 Patient to have WFL AAROM with 3-way pulley's for improved flexibility.  -TL     STG 3 Progress Ongoing  -TL     STG 4 AROM R Shoulder flexion >= 110°   -TL     STG 4 Progress Ongoing  -TL     STG 5 AROM R Shoulder abduction >= 100°  -TL     STG 5 Progress Ongoing  -TL        Long Term Goals    LTG Date to Achieve 11/17/22  -TL     LTG 1 AROM R Shoulder flexion >= 125°  -TL     LTG 2 AROM R Shoulder abduction >= 115°  -TL     LTG 3 AROM R shoulder ER >= 45° at 80-90° of shoulder abduction.  -TL     LTG 4 AROM R shoulder IR >= 55° at 80-90° of shoulder abduction.  -TL     LTG 5 R UE >= 4/5 in available range.  -TL     LTG 6 Patient able to perform 2 minutes of OH activities with no increase in pain.  -TL     LTG 7 I with final HEP.  -TL           User Key  (r) = Recorded By, (t) = Taken By, (c) = Cosigned By    Initials Name Provider Type    Mer Truong PTA Physical Therapist Assistant                               Time Calculation:   Start Time: 1345  Stop Time: 1445  Time Calculation (min): 60 min  Untimed Charges  PT E-Stim Unattended Minutes: 15  Total Minutes  Untimed Charges Total Minutes: 15   Total Minutes: 15  Therapy Charges for Today     Code Description Service Date Service Provider Modifiers Qty    69272124436 HC PT ELECTRICAL STIM UNATTENDED 10/27/2022 Mer Michelle PTA CQ 1    42666677480 HC PT THER PROC EA 15 MIN 10/27/2022 Mer Michelle PTA GP, CQ 3                    Mer Michelle PTA  10/27/2022

## 2022-10-27 NOTE — PROGRESS NOTES
CVTS Office Progress Note       Subjective   Patient ID: Janette Chadwick is a 57 y.o. female is being seen for consultation today at the request of INES Azevedo    Chief Complaint:    Chief Complaint   Patient presents with   • Peripheral Vascular Disease       The following portions of the patient's history were reviewed and updated as appropriate: allergies, current medications, past family history, past medical history, past social history, past surgical history and problem list.  Recent images independently reviewed.  Available laboratory values reviewed.    PCP:  Nata Morgan APRN  Cardiology:  Jamey    57 y.o. female with HTN(stable, increased risk stroke, rupture), Hyperlipidemia(stable, increased risk cardiovascular events), Diabetes Mellitus(stable, increased risk cardiovascular events), Obesity(uncontrolled, increased risk cardiovascular events) and COPD(stable, increased risk pulmonary complications).  never smoked.  Referred for vascular evaluation numbness/tingling (chronic), reports dusky toes when feet hanging down. BLE swelling/pain..  No TIA stroke amaurosis.  No MI. Reports claudication 100ft (jello legs). No other associated signs, symptoms or modifying factors.    10/2022: MARCO ANTONIO: RIGHT 0.93 Triphasic LEFT 0.84 Triphasic     Past Medical History:   Diagnosis Date   • Arthritis    • Asthma    • Carpal tunnel syndrome 2020   • CHF (congestive heart failure) (HCC)    • COPD (chronic obstructive pulmonary disease) (HCC)    • Diabetes mellitus (HCC)    • Disease of thyroid gland    • Elevated cholesterol    • GERD (gastroesophageal reflux disease)    • Hyperlipidemia    • Hypertension    • Sleep apnea      Past Surgical History:   Procedure Laterality Date   • CHOLECYSTECTOMY     • CHOLECYSTECTOMY     • FRACTURE SURGERY     • HYSTERECTOMY     • JOINT REPLACEMENT     • KIDNEY STONE SURGERY     • TIBIA FRACTURE SURGERY     • TUBAL ABDOMINAL LIGATION       History reviewed. No pertinent family  history.  Social History     Tobacco Use   • Smoking status: Never   • Smokeless tobacco: Never   Vaping Use   • Vaping Use: Never used   Substance Use Topics   • Alcohol use: No   • Drug use: No       ALLERGIES:   Adhesive tape, Other, and Penicillins    MEDICATIONS:      Current Outpatient Medications:   •  albuterol sulfate  (90 Base) MCG/ACT inhaler, Inhale 2 puffs Every 4 (Four) Hours As Needed for Wheezing., Disp: , Rfl:   •  amitriptyline (ELAVIL) 100 MG tablet, amitriptyline 100 mg tablet  Take 1 tablet every day by oral route., Disp: , Rfl:   •  ascorbic acid (VITAMIN C) 100 MG tablet, Vitamin C  1000 daily, Disp: , Rfl:   •  atorvastatin (LIPITOR) 20 MG tablet, Take 20 mg by mouth Daily., Disp: , Rfl:   •  calcium carbonate (OS-DIVYA) 600 MG tablet, Take 600 mg by mouth Daily., Disp: , Rfl:   •  carvedilol (COREG) 3.125 MG tablet, Take 3.125 mg by mouth 2 (Two) Times a Day With Meals., Disp: , Rfl:   •  cetirizine (zyrTEC) 10 MG tablet, Take 1 tablet by mouth Daily., Disp: , Rfl:   •  cholecalciferol (VITAMIN D3) 1000 units tablet, Take 2,000 Units by mouth Daily., Disp: , Rfl:   •  Cobalamin Combinations (B-12) 100-5000 MCG sublingual tablet, B12  1000 daily, Disp: , Rfl:   •  cromolyn (OPTICROM) 4 % ophthalmic solution, Administer 1 drop to both eyes Daily., Disp: , Rfl:   •  docusate sodium 100 MG capsule, Take 1 capsule by mouth Daily., Disp: 90 each, Rfl: 0  •  DULoxetine (CYMBALTA) 60 MG capsule, duloxetine 60 mg capsule,delayed release  Take 1 capsule every day by oral route., Disp: , Rfl:   •  Emgality 120 MG/ML solution prefilled syringe, , Disp: , Rfl:   •  empagliflozin (Jardiance) 10 MG tablet tablet, Take 1 tablet by mouth Daily., Disp: 30 tablet, Rfl: 11  •  esomeprazole (nexIUM) 20 MG capsule, , Disp: , Rfl:   •  ferrous sulfate 325 (65 FE) MG tablet, Take 325 mg by mouth Daily With Breakfast., Disp: , Rfl:   •  Fetzima 40 MG capsule sustained-release 24 hr, TAKE ONE CAPSULE BY MOUTH  ONCE EVERY DAY, Disp: , Rfl:   •  fluticasone (FLONASE) 50 MCG/ACT nasal spray, 2 sprays into the nostril(s) as directed by provider Daily., Disp: , Rfl:   •  FREESTYLE LITE test strip, USE ONE STRIP TO CHECK BLOOD GLUCOSE TWICE DAILY, Disp: , Rfl:   •  furosemide (LASIX) 40 MG tablet, Take 40 mg by mouth Daily., Disp: , Rfl:   •  ibuprofen (ADVIL,MOTRIN) 600 MG tablet, TAKE ONE TABLET BY MOUTH EVERY 6 HOURS AS NEEDED FOR PAIN (MILD ONE TO THREE) FOR FOURTEEN DAYS. TAKE WITH FOOD OR MILK. DO NOT EXCEED 3200MG PER DAY, Disp: , Rfl:   •  levothyroxine (SYNTHROID, LEVOTHROID) 150 MCG tablet, levothyroxine 150 mcg tablet  Take 1 tablet every day by oral route., Disp: , Rfl:   •  LINZESS 72 MCG capsule capsule, Take 72 mcg by mouth Daily., Disp: , Rfl:   •  lisinopril-hydrochlorothiazide (PRINZIDE,ZESTORETIC) 20-12.5 MG per tablet, Take 1 tablet by mouth Daily., Disp: , Rfl:   •  Magnesium Oxide 500 MG tablet, Take 500 mg by mouth Daily., Disp: , Rfl:   •  metFORMIN (GLUCOPHAGE) 1000 MG tablet, Take 1,000 mg by mouth 2 (Two) Times a Day With Meals., Disp: , Rfl:   •  naproxen (NAPROSYN) 500 MG tablet, Take 500 mg by mouth As Needed., Disp: , Rfl:   •  pioglitazone (ACTOS) 15 MG tablet, pioglitazone 15 mg tablet  Take 1 tablet every day by oral route., Disp: , Rfl:   •  potassium chloride (K-DUR) 10 MEQ CR tablet, Take 10 mEq by mouth Daily., Disp: , Rfl:   •  prochlorperazine (COMPAZINE) 10 MG tablet, Take 10 mg by mouth., Disp: , Rfl:   •  Semaglutide, 1 MG/DOSE, (Ozempic, 1 MG/DOSE,) 4 MG/3ML solution pen-injector, Inject 1 mg under the skin into the appropriate area as directed 1 (One) Time Per Week., Disp: 3 mL, Rfl: 3  •  Symbicort 80-4.5 MCG/ACT inhaler, Inhale 2 puffs 2 (Two) Times a Day., Disp: , Rfl:   •  TiZANidine (ZANAFLEX) 4 MG capsule, Take 4 mg by mouth Every Night., Disp: , Rfl:   •  TRUEplus Lancets 28G misc, , Disp: , Rfl:   •  aspirin 81 MG EC tablet, Take 1 tablet by mouth Daily., Disp: 30 tablet,  "Rfl: 11    Review of Systems   Eyes: Negative for visual disturbance.   Cardiovascular: Negative for claudication and cyanosis.   Respiratory: Negative for shortness of breath.    Skin: Positive for color change, dry skin and nail changes.   Musculoskeletal: Negative for muscle weakness.   Gastrointestinal: Negative for dysphagia.   Neurological: Positive for numbness and paresthesias. Negative for focal weakness, light-headedness, loss of balance and weakness.   Psychiatric/Behavioral: Negative for altered mental status.   All other systems reviewed and are negative.       Objective   Vitals:    10/18/22 0847   BP: 136/70   BP Location: Left arm   Patient Position: Sitting   Cuff Size: Adult   Pulse: 120   SpO2: 97%   Weight: (!) 176 kg (387 lb)   Height: 182.9 cm (72\")     Body mass index is 52.49 kg/m².  Physical Exam  Vitals reviewed.   Constitutional:       Appearance: Normal appearance.   HENT:      Head: Normocephalic.      Nose: Nose normal.      Mouth/Throat:      Mouth: Mucous membranes are moist.   Eyes:      Extraocular Movements: Extraocular movements intact.   Neck:      Vascular: No carotid bruit.   Cardiovascular:      Rate and Rhythm: Regular rhythm. Tachycardia present.      Pulses:           Dorsalis pedis pulses are 2+ on the right side and 2+ on the left side.        Posterior tibial pulses are 2+ on the right side and 2+ on the left side.      Heart sounds: Normal heart sounds.   Pulmonary:      Effort: Pulmonary effort is normal.      Breath sounds: Normal breath sounds.   Abdominal:      General: Bowel sounds are normal.      Palpations: Abdomen is soft.   Musculoskeletal:         General: Normal range of motion.      Cervical back: Normal range of motion.   Skin:     General: Skin is warm and dry.      Capillary Refill: Capillary refill takes less than 2 seconds.   Neurological:      General: No focal deficit present.      Mental Status: She is alert and oriented to person, place, and time. "      Gait: Gait normal.   Psychiatric:         Mood and Affect: Mood normal.         Behavior: Behavior normal.           Assessment & Plan     Independent Review of Radiographic Studies:  Detailed discussion regarding risks, benefits, and treatment plan. Images independently reviewed. Patient understands, agrees, and wishes to proceed with plan.     1. PVD (peripheral vascular disease) with claudication (HCC)  mild, moderate reduction Arterial Flow bilateral Lower Extremity with claudication  Medical Management: add ASA,cont STATIN   If signs and symptoms of ischemia should occur including but not limited to pale/blue discoloration of limb, increasing pain with ambulation or at rest, or a non-healing wound. Patient is to notify [facility] center for immediate evaluation.   Return as scheduled- Stress MARCO ANTONIO, BLE Venous   - Doppler Arterial Lower Extremity Stress CAR; Future  - aspirin 81 MG EC tablet; Take 1 tablet by mouth Daily.  Dispense: 30 tablet; Refill: 11    2. Localized swelling of both lower legs  - Duplex Venous Lower Extremity - Bilateral CAR; Future    3. Diabetic mononeuropathy associated with diabetes mellitus due to underlying condition (HCC)  Increased risk for worsening PAD, CAD, stent closure, and progressive carotid disease with uncontrolled DM.   Lab Results   Component Value Date    HGBA1C 6.0 06/24/2019     Medical management: Diet. Oral Medications. Insulin. Other injectables.      4. Mixed hyperlipidemia  Lipid-lowering therapy has been proven beneficial in patients with cardio-vascular disease. Current guidelines recommend statin treatment for all patients with PAD,CAD and carotid stenosis. Statins are beneficial in preventing cardiovascular events, increasing functional capacity and lower the risk of adverse limb loss in PAD. Statins decrease the progression of plaque formation and may improve peripheral vessel lining, and aid in reversing atherosclerosis.       5. Essential  hypertension  controlled    6. Morbid obesity with BMI of 50.0-59.9, adult (HCC)  Your BMI is Body mass index is 52.49 kg/m². and falls within  Obese Class III extreme obesity: > or equal to 40kg/m2. BMI is strongly correlated with increased health risks. Review options for weight management, heart healthy diet, and exercise programs.         Advance Care Planning discussed and  Informational packet given to patient. Advance Care Plan on file: No    Time spent 45 minutes in face to face evaluation, reviewing of medical history, tests, and procedures. Independent interpretation of vascular studies, ordering additional tests, documentation, and coordination of care.   Counseling and education with the patient and family regarding treatment options, plan of care, and hoped for outcomes. All questions answered.         This document has been electronically signed by INES Romero on October 27, 2022 14:32 CDT

## 2022-11-01 ENCOUNTER — OFFICE VISIT (OUTPATIENT)
Dept: CARDIOLOGY | Facility: CLINIC | Age: 57
End: 2022-11-01

## 2022-11-01 VITALS
BODY MASS INDEX: 39.68 KG/M2 | TEMPERATURE: 97.1 F | HEIGHT: 72 IN | HEART RATE: 93 BPM | WEIGHT: 293 LBS | DIASTOLIC BLOOD PRESSURE: 74 MMHG | OXYGEN SATURATION: 98 % | SYSTOLIC BLOOD PRESSURE: 134 MMHG

## 2022-11-01 DIAGNOSIS — I10 ESSENTIAL HYPERTENSION: Primary | ICD-10-CM

## 2022-11-01 DIAGNOSIS — I73.9 PVD (PERIPHERAL VASCULAR DISEASE) WITH CLAUDICATION: ICD-10-CM

## 2022-11-01 DIAGNOSIS — G47.33 OBSTRUCTIVE SLEEP APNEA SYNDROME: ICD-10-CM

## 2022-11-01 DIAGNOSIS — E78.2 MIXED HYPERLIPIDEMIA: ICD-10-CM

## 2022-11-01 DIAGNOSIS — E66.01 MORBID OBESITY WITH BMI OF 50.0-59.9, ADULT: ICD-10-CM

## 2022-11-01 DIAGNOSIS — I50.9 CONGESTIVE HEART FAILURE, UNSPECIFIED HF CHRONICITY, UNSPECIFIED HEART FAILURE TYPE: ICD-10-CM

## 2022-11-01 DIAGNOSIS — R06.09 DYSPNEA ON EXERTION: ICD-10-CM

## 2022-11-01 PROCEDURE — 99204 OFFICE O/P NEW MOD 45 MIN: CPT | Performed by: INTERNAL MEDICINE

## 2022-11-01 PROCEDURE — 93000 ELECTROCARDIOGRAM COMPLETE: CPT | Performed by: INTERNAL MEDICINE

## 2022-11-01 RX ORDER — PYRAZINAMIDE 500 MG/1
TABLET ORAL EVERY 6 HOURS
COMMUNITY
End: 2022-11-30

## 2022-11-01 RX ORDER — TRAZODONE HYDROCHLORIDE 100 MG/1
TABLET ORAL
COMMUNITY
End: 2022-11-01 | Stop reason: ALTCHOICE

## 2022-11-01 RX ORDER — MAGNESIUM GLUCONATE 27 MG(500)
TABLET ORAL
COMMUNITY

## 2022-11-01 RX ORDER — ASCORBIC ACID/MULTIVIT-MIN 1000 MG
EFFERVESCENT POWDER IN PACKET ORAL
COMMUNITY
End: 2022-11-30

## 2022-11-01 RX ORDER — NYSTATIN 100000 U/G
CREAM TOPICAL
COMMUNITY

## 2022-11-01 RX ORDER — LORATADINE 10 MG/1
10 TABLET ORAL DAILY
COMMUNITY
Start: 2022-08-20 | End: 2022-12-28

## 2022-11-01 NOTE — PROGRESS NOTES
Janette Chadwick  57 y.o. female    11/01/2022  1. Essential hypertension    2. Morbid obesity with BMI of 50.0-59.9, adult (HCC)    3. Obstructive sleep apnea syndrome    4. Congestive heart failure, unspecified HF chronicity, unspecified heart failure type (HCC)    5. Mixed hyperlipidemia    6. PVD (peripheral vascular disease) with claudication (HCC)    7. Dyspnea on exertion        History of Present Illness  Janette Chadwick is a 57-year-old female who was referred by INES Azevedo, for cardiac evaluation.  She has multiple medical issues which include morbid obesity, hypertension, hyperlipidemia, diabetes mellitus, COPD, sleep apnea, hypothyroidism, degenerative joint disease, peripheral vascular disease.  She carries a diagnosis of congestive heart failure in the past, most likely secondary to HFpEF.  She is here to establish with our practice.  She has been following up with a cardiologist in [place] [employer] on a yearly basis.    Patient denies any chest pain but does have chronic dyspnea on exertion.  Her BNP has been normal in the past couple of years prior.  She denied any previous documented coronary artery disease and for the most part able to perform her activities of daily living.  She does have circulation issues in the lower extremities and some discoloration of the toes.  She has seen vascular surgery.      MARCO ANTONIO performed on 10/18/2022 showed:  •  Right Conclusion: The right MARCO ANTONIO is normal.  •  Left Conclusion: The left MARCO ANTONIO is moderately reduced.  MARCO ANTONIO on the right side was 0.93 and on the left side 0.84.    Patient had an echocardiogram in April 2022 which showed estimated ejection fraction of 46% with mild global hypokinesis of the left ventricle.  There is pseudo normal diastolic dysfunction.  No LV mass or thrombus was visualized.  No significant valve abnormalities were noted.  She has had a dobutamine stress echocardiogram in June 2019 which showed no definite wall motion abnormalities.    She had lab  work on 8/17/2022 and electrolytes were in the normal range and BUN was 6 with a creatinine of 0.74.  LFTs were within normal limits.  Magnesium was 1.6.  Sodium 134, chloride 95.  TSH 6.47 CBC showed a hemoglobin of 9.3 and hematocrit of 30.3 with a platelet count of 495 hemoglobin A1c was 8.2.    EKG today showed sinus rhythm with heart rate of 93 bpm.  Minimal nonspecific ST-T changes.    SUBJECTIVE    Allergies   Allergen Reactions   • Adhesive Tape Rash   • Other Rash   • Penicillins Rash, Hives and Itching         Past Medical History:   Diagnosis Date   • Arthritis    • Asthma    • Carpal tunnel syndrome 2020   • CHF (congestive heart failure) ([facility])    • COPD (chronic obstructive pulmonary disease) ([facility])    • Diabetes mellitus (HCC)    • Disease of thyroid gland    • Elevated cholesterol    • GERD (gastroesophageal reflux disease)    • Hyperlipidemia    • Hypertension    • Sleep apnea          Past Surgical History:   Procedure Laterality Date   • CHOLECYSTECTOMY     • CHOLECYSTECTOMY     • FRACTURE SURGERY     • HYSTERECTOMY     • JOINT REPLACEMENT     • KIDNEY STONE SURGERY     • TIBIA FRACTURE SURGERY     • TUBAL ABDOMINAL LIGATION           History reviewed. No pertinent family history.      Social History     Socioeconomic History   • Marital status:    Tobacco Use   • Smoking status: Never   • Smokeless tobacco: Never   Vaping Use   • Vaping Use: Never used   Substance and Sexual Activity   • Alcohol use: No   • Drug use: No         Current Outpatient Medications   Medication Sig Dispense Refill   • acetaminophen-codeine (TYLENOL with CODEINE #3) 300-30 MG per tablet Every 6 (Six) Hours.     • albuterol sulfate  (90 Base) MCG/ACT inhaler Inhale 2 puffs Every 4 (Four) Hours As Needed for Wheezing.     • amitriptyline (ELAVIL) 100 MG tablet amitriptyline 100 mg tablet   Take 1 tablet every day by oral route.     • ascorbic acid (VITAMIN C) 100 MG tablet Vitamin C   1000 daily     • aspirin  81 MG EC tablet Take 1 tablet by mouth Daily. 30 tablet 11   • atorvastatin (LIPITOR) 20 MG tablet Take 20 mg by mouth Daily.     • calcium carbonate (OS-DIVYA) 600 MG tablet Take 600 mg by mouth Daily.     • carvedilol (COREG) 3.125 MG tablet Take 3.125 mg by mouth 2 (Two) Times a Day With Meals.     • cetirizine (zyrTEC) 10 MG tablet Take 1 tablet by mouth Daily.     • cholecalciferol (VITAMIN D3) 1000 units tablet Take 2,000 Units by mouth Daily.     • Cobalamin Combinations (B-12) 100-5000 MCG sublingual tablet B12   1000 daily     • cromolyn (OPTICROM) 4 % ophthalmic solution Administer 1 drop to both eyes Daily.     • docusate sodium 100 MG capsule Take 1 capsule by mouth Daily. 90 each 0   • DULoxetine (CYMBALTA) 60 MG capsule duloxetine 60 mg capsule,delayed release   Take 1 capsule every day by oral route.     • Emgality 120 MG/ML solution prefilled syringe      • empagliflozin (Jardiance) 10 MG tablet tablet Take 1 tablet by mouth Daily. 30 tablet 11   • esomeprazole (nexIUM) 20 MG capsule      • ferrous sulfate 325 (65 FE) MG tablet Take 325 mg by mouth Daily With Breakfast.     • Fetzima 40 MG capsule sustained-release 24 hr TAKE ONE CAPSULE BY MOUTH ONCE EVERY DAY     • fluticasone (FLONASE) 50 MCG/ACT nasal spray 2 sprays into the nostril(s) as directed by provider Daily.     • FREESTYLE LITE test strip USE ONE STRIP TO CHECK BLOOD GLUCOSE TWICE DAILY     • furosemide (LASIX) 40 MG tablet Take 40 mg by mouth Daily.     • ibuprofen (ADVIL,MOTRIN) 600 MG tablet TAKE ONE TABLET BY MOUTH EVERY 6 HOURS AS NEEDED FOR PAIN (MILD ONE TO THREE) FOR FOURTEEN DAYS. TAKE WITH FOOD OR MILK. DO NOT EXCEED 3200MG PER DAY     • levothyroxine (SYNTHROID, LEVOTHROID) 150 MCG tablet levothyroxine 150 mcg tablet   Take 1 tablet every day by oral route.     • LINZESS 72 MCG capsule capsule Take 72 mcg by mouth Daily.     • lisinopril-hydrochlorothiazide (PRINZIDE,ZESTORETIC) 20-12.5 MG per tablet Take 1 tablet by mouth  "Daily.     • loratadine (CLARITIN) 10 MG tablet Take 1 tablet by mouth Daily.     • magnesium gluconate (MAGONATE) 500 MG tablet magnesium 500 mg tablet   Take by oral route.     • metFORMIN (GLUCOPHAGE) 1000 MG tablet Take 1,000 mg by mouth 2 (Two) Times a Day With Meals.     • naproxen (NAPROSYN) 500 MG tablet Take 500 mg by mouth As Needed.     • nystatin (MYCOSTATIN) 482895 UNIT/GM cream APPLY ONE APPLICATION TOPICALLY TO  INCISION SITE TWICE DAILY FOR 30 DAYS     • Oral Electrolytes (Emergen-C Electro Mix) pack potassium   daily     • pioglitazone (ACTOS) 15 MG tablet pioglitazone 15 mg tablet   Take 1 tablet every day by oral route.     • potassium chloride (K-DUR) 10 MEQ CR tablet Take 10 mEq by mouth Daily.     • prochlorperazine (COMPAZINE) 10 MG tablet Take 10 mg by mouth.     • Semaglutide, 1 MG/DOSE, (Ozempic, 1 MG/DOSE,) 4 MG/3ML solution pen-injector Inject 1 mg under the skin into the appropriate area as directed 1 (One) Time Per Week. 3 mL 3   • Symbicort 80-4.5 MCG/ACT inhaler Inhale 2 puffs 2 (Two) Times a Day.     • TiZANidine (ZANAFLEX) 4 MG capsule Take 4 mg by mouth Every Night.     • TRUEplus Lancets 28G misc        No current facility-administered medications for this visit.         OBJECTIVE    /74 (BP Location: Left arm, Patient Position: Sitting, Cuff Size: Adult)   Pulse 93   Temp 97.1 °F (36.2 °C)   Ht 182.9 cm (72\")   Wt 127 kg (279 lb 6.4 oz)   SpO2 98%   BMI 37.89 kg/m²         Review of Systems     Constitutional:  Denies recent weight loss, weight gain, fever or chills, no change in exercise tolerance     HENT:  Denies any hearing loss, epistaxis, hoarseness, or difficulty speaking.     Eyes: Wears eyeglasses or contact lenses     Respiratory:  Dyspnea with exertion, no cough, wheezing, or hemoptysis.     Cardiovascular: Negative for palpitations, chest pain, orthopnea, PND    Gastrointestinal:  Denies change in bowel habits, dyspepsia, ulcer disease, " hematochezia, or melena.     Endocrine: Negative for cold intolerance, heat intolerance, polydipsia, polyphagia and polyuria.  Diabetes mellitus, hyperlipidemia, hypothyroidism    Genitourinary: Negative.      Musculoskeletal: DJD    Skin:  Denies any change in hair or nails, rashes, or skin lesions.     Allergic/Immunologic: Negative.  Negative for environmental allergies, food allergies and/or immunocompromised state.     Neurological:  Denies any history of recurrent headaches, strokes, TIA, or seizure disorder.     Hematological: Denies any food allergies, seasonal allergies, bleeding disorders, or lymphadenopathy.     Psychiatric/Behavioral: Denies any history of depression, substance abuse, or change in cognitive function.         Physical Exam     Constitutional: Cooperative, alert and oriented, morbidly obese    HENT:   Head: Normocephalic, normal hair patterns, no masses or tenderness.  Ears, Nose, and Throat: No gross abnormalities. No pallor or cyanosis. Dentition good.   Eyes: EOMS intact, PERRL, conjunctivae and lids unremarkable. Fundoscopic exam and visual fields not performed.   Neck: No palpable masses or adenopathy, no thyromegaly, no JVD, carotid pulses are full and equal bilaterally and without bruit.     Cardiovascular: Regular rhythm, S1 and S2 normal, no S3 or S4.  No murmurs, gallops, or rubs detected.     Pulmonary/Chest: Chest: normal symmetry, normal respiratory excursion, no intercostal retraction, no use of accessory muscles.     Pulmonary: Normal breath sounds. No rales or rhonchi.    Abdominal: Abdomen soft, bowel sounds normoactive, no masses, no hepatosplenomegaly, nontender, no bruit.     Musculoskeletal: No deformities, clubbing, cyanosis, erythema.  Dusky appearance of the toes both feet    Neurological: No gross motor or sensory deficits noted, affect appropriate, oriented to time, person, place.     Skin: Warm and dry to the touch, no apparent skin lesion or mass noted.      Psychiatric: She has a normal mood and affect. Her behavior is normal. Judgment and thought content normal.         Procedures      Lab Results   Component Value Date    WBC 12.1 (H) 08/17/2020    HGB 13.9 08/17/2020    HCT 43.3 08/17/2020    MCV 87.7 08/17/2020     08/17/2020     Lab Results   Component Value Date    GLUCOSE 99 01/14/2020    BUN 19 01/14/2020    CREATININE 0.7 01/14/2020    EGFRIFNONA >60 01/14/2020    BCR 16.9 06/24/2019    CO2 29 01/14/2020    CALCIUM 10.1 (H) 01/14/2020    ALBUMIN 4.50 06/23/2019    AST 30 06/23/2019    ALT 28 06/23/2019     Lab Results   Component Value Date    CHOL 153 06/24/2019     Lab Results   Component Value Date    TRIG 181 (H) 08/17/2020    TRIG 168 (H) 06/24/2019     Lab Results   Component Value Date    HDL 38 (L) 08/17/2020    HDL 32 (L) 06/24/2019     No components found for: LDLCALC  Lab Results   Component Value Date    LDL 80 08/17/2020    LDL 98 06/24/2019     No results found for: HDLLDLRATIO  No components found for: CHOLHDL  Lab Results   Component Value Date    HGBA1C 6.0 06/24/2019     Lab Results   Component Value Date    TSH <0.020 (L) 06/23/2019           ASSESSMENT AND PLAN  Janette Chadwick is a 57-year-old female with multiple medical issues as discussed in detail and history of present illness visit established with the practice.  She has no previous documented coronary artery disease.  She carries a diagnosis of congestive heart failure in the background of longstanding morbid obesity, hypertension, hyperlipidemia, diabetes mellitus, peripheral vascular disease, COPD, hypothyroidism.   She is clinically compensated at the present time with no evidence of bronchospasm or rales.  The plan will be to proceed with a BNP and an echocardiogram to assess left ventricular and valvular function has been arranged.    I have continued her current medicines including antiplatelet therapy with aspirin, lipid-lowering therapy with atorvastatin, management  of hypertension with carvedilol, lisinopril HCTZ.  She is on both Ozempic and Jardiance.  Suspicion of ongoing ischemia is low.    Thank you for asked me to see this patient.    Diagnoses and all orders for this visit:    1. Essential hypertension (Primary)  -     ECG 12 Lead  -     Adult Transthoracic Echo Complete w/ Color, Spectral and Contrast if Necessary Per Protocol; Future  -     BNP    2. Morbid obesity with BMI of 50.0-59.9, adult (HCC)  -     Adult Transthoracic Echo Complete w/ Color, Spectral and Contrast if Necessary Per Protocol; Future  -     BNP    3. Obstructive sleep apnea syndrome  -     Adult Transthoracic Echo Complete w/ Color, Spectral and Contrast if Necessary Per Protocol; Future  -     BNP    4. Congestive heart failure, unspecified HF chronicity, unspecified heart failure type (HCC)  -     Adult Transthoracic Echo Complete w/ Color, Spectral and Contrast if Necessary Per Protocol; Future  -     BNP    5. Mixed hyperlipidemia  -     Adult Transthoracic Echo Complete w/ Color, Spectral and Contrast if Necessary Per Protocol; Future  -     BNP    6. PVD (peripheral vascular disease) with claudication (HCC)  -     Adult Transthoracic Echo Complete w/ Color, Spectral and Contrast if Necessary Per Protocol; Future  -     BNP    7. Dyspnea on exertion  -     Adult Transthoracic Echo Complete w/ Color, Spectral and Contrast if Necessary Per Protocol; Future  -     BNP        Class 3 Severe Obesity (BMI >=40). Obesity-related health conditions include the following: obstructive sleep apnea, hypertension, diabetes mellitus, dyslipidemias, peripheral vascular disease and osteoarthritis. Obesity is unchanged. BMI is is above average; BMI management plan is completed. We discussed portion control and increasing exercise.      Janette Chadwick  reports that she has never smoked. She has never used smokeless tobacco..        Marlon Smiley MD  11/1/2022  08:28 CDT

## 2022-11-02 LAB
QT INTERVAL: 358 MS
QTC INTERVAL: 445 MS

## 2022-11-03 ENCOUNTER — HOSPITAL ENCOUNTER (OUTPATIENT)
Dept: PHYSICAL THERAPY | Facility: HOSPITAL | Age: 57
Setting detail: THERAPIES SERIES
Discharge: HOME | End: 2022-11-03

## 2022-11-03 DIAGNOSIS — Z96.611 STATUS POST REVERSE TOTAL REPLACEMENT OF RIGHT SHOULDER: Primary | ICD-10-CM

## 2022-11-03 DIAGNOSIS — M25.511 RIGHT SHOULDER PAIN, UNSPECIFIED CHRONICITY: ICD-10-CM

## 2022-11-03 PROCEDURE — 97110 THERAPEUTIC EXERCISES: CPT

## 2022-11-03 PROCEDURE — G0283 ELEC STIM OTHER THAN WOUND: HCPCS

## 2022-11-03 RX ORDER — DULOXETIN HYDROCHLORIDE 60 MG/1
60 CAPSULE, DELAYED RELEASE ORAL DAILY
Qty: 30 CAPSULE | Refills: 1 | Status: SHIPPED | OUTPATIENT
Start: 2022-11-03 | End: 2022-12-21

## 2022-11-03 RX ORDER — POTASSIUM CHLORIDE 750 MG/1
10 TABLET, FILM COATED, EXTENDED RELEASE ORAL DAILY
Qty: 30 TABLET | Refills: 1 | Status: SHIPPED | OUTPATIENT
Start: 2022-11-03 | End: 2022-12-21

## 2022-11-03 RX ORDER — TIZANIDINE HYDROCHLORIDE 4 MG/1
4 CAPSULE, GELATIN COATED ORAL NIGHTLY
Qty: 30 CAPSULE | Refills: 0 | Status: SHIPPED | OUTPATIENT
Start: 2022-11-03 | End: 2022-12-29

## 2022-11-03 NOTE — TELEPHONE ENCOUNTER
----- Message from Janette Chadwick sent at 11/3/2022  1:26 PM CDT -----  Regarding: Hydroxyzine 25 mg  Contact: [phone]  I'm out of refills on this med [facility] pharmacy thanks Janette Jordan

## 2022-11-03 NOTE — TELEPHONE ENCOUNTER
Refills have been sent.   The Hydroxyzine 25 mg is not listed as a past or present medication.   I have sent a message to her to find out who may have prescribed the medication      Next Naya 11/30/2022

## 2022-11-04 DIAGNOSIS — F41.9 ANXIETY: Primary | ICD-10-CM

## 2022-11-04 RX ORDER — HYDROXYZINE HYDROCHLORIDE 25 MG/1
25 TABLET, FILM COATED ORAL DAILY PRN
Qty: 30 TABLET | Refills: 0 | Status: SHIPPED | OUTPATIENT
Start: 2022-11-04 | End: 2023-01-20

## 2022-11-07 ENCOUNTER — APPOINTMENT (OUTPATIENT)
Dept: PHYSICAL THERAPY | Facility: HOSPITAL | Age: 57
End: 2022-11-07

## 2022-11-10 ENCOUNTER — HOSPITAL ENCOUNTER (OUTPATIENT)
Dept: PHYSICAL THERAPY | Facility: HOSPITAL | Age: 57
Setting detail: THERAPIES SERIES
Discharge: HOME | End: 2022-11-10

## 2022-11-10 DIAGNOSIS — M25.511 RIGHT SHOULDER PAIN, UNSPECIFIED CHRONICITY: ICD-10-CM

## 2022-11-10 DIAGNOSIS — Z96.611 STATUS POST REVERSE TOTAL REPLACEMENT OF RIGHT SHOULDER: Primary | ICD-10-CM

## 2022-11-10 PROCEDURE — 97110 THERAPEUTIC EXERCISES: CPT

## 2022-11-10 NOTE — THERAPY TREATMENT NOTE
Outpatient Physical Therapy Ortho Treatment Note  [facility]     Patient Name: Janette Chadwick  : 1965  MRN: [id number]  Today's Date: 11/10/2022      Visit Date: 11/10/2022  Subjective Improvement: 45%  MD visit: MAKAYLA  Visit Number:   Total Approved:18  Recert Date: 11/10/2022  Visit Dx:    ICD-10-CM ICD-9-CM   1. Status post reverse total replacement of right shoulder  Z96.611 V43.61   2. Right shoulder pain, unspecified chronicity  M25.511 719.41       Patient Active Problem List   Diagnosis   • Chest pain   • Diabetes mellitus ([facility])   • Mixed hyperlipidemia   • Essential hypertension   • Chronic obstructive pulmonary disease ([facility])   • Refractory migraine without aura   • Chronic migraine   • Morbid obesity with BMI of 50.0-59.9, adult ([facility])   • Pain in right upper arm   • Hypothyroidism   • Abnormal cardiovascular stress test   • Congestive heart failure ([facility])   • Constipation   • Diabetic neuropathy ([facility])   • Diastolic dysfunction   • Dyspnea on exertion   • Gastro-esophageal reflux disease without esophagitis   • Headache   • Hearing loss   • Hyperglycemia due to type 2 diabetes mellitus ([facility])   • Insomnia   • Mixed anxiety and depressive disorder   • Never smoked tobacco   • Obstructive sleep apnea syndrome   • Osteoarthritis of knee   • History of sleep apnea   • Severe obesity ([facility])   • Polyarthritis   • Type 2 diabetes mellitus without complication ([facility])   • Vitamin D deficiency   • PVD (peripheral vascular disease) with claudication ([facility])   • Localized swelling of both lower legs        Past Medical History:   Diagnosis Date   • Arthritis    • Asthma    • Carpal tunnel syndrome    • CHF (congestive heart failure) ([facility])    • COPD (chronic obstructive pulmonary disease) ([facility])    • Diabetes mellitus ([facility])    • Disease of thyroid gland    • Elevated cholesterol    • GERD (gastroesophageal reflux disease)    • Hyperlipidemia    • Hypertension    • Sleep apnea         Past Surgical  History:   Procedure Laterality Date   • CHOLECYSTECTOMY     • CHOLECYSTECTOMY     • FRACTURE SURGERY     • HYSTERECTOMY     • JOINT REPLACEMENT     • KIDNEY STONE SURGERY     • TIBIA FRACTURE SURGERY     • TUBAL ABDOMINAL LIGATION          PT Ortho     Row Name 11/10/22 1000       Precautions and Contraindications    Precautions R RTSA  -TL          User Key  (r) = Recorded By, (t) = Taken By, (c) = Cosigned By    Initials Name Provider Type    Mer Truong PTA Physical Therapist Assistant                             PT Assessment/Plan     Row Name 11/10/22 1100          PT Assessment    Assessment Comments Pt treatment terminated treatment after fall. Pt reports back hurts. No increase pain with RTS. No new goals met at this time. Therapist assessed pt.  -TL        PT Plan    PT Frequency 2x/week  -TL     Predicted Duration of Therapy Intervention (PT) 16-20 visit  -TL     PT Plan Comments continue with POC  -TL           User Key  (r) = Recorded By, (t) = Taken By, (c) = Cosigned By    Initials Name Provider Type    Mer Truong PTA Physical Therapist Assistant                   OP Exercises     Row Name 11/10/22 1000             Subjective Comments    Subjective Comments Pt reports that she feels that she has a pinced nerve in her neck.  Treatment terminated secondary to pt fell during treatment.  -TL         Subjective Pain    Able to rate subjective pain? yes  -TL      Pre-Treatment Pain Level 6  -TL         Exercise 1    Exercise Name 1 pro ll strengthening  -TL      Time 1 5 fwd,5 back  -TL      Additional Comments level 3.5  -TL         Exercise 2    Exercise Name 2 upper trap S  -TL      Sets 2 3  -TL      Time 2 30 sec  -TL         Exercise 3    Exercise Name 3 levator S  -TL      Sets 3 3  -TL      Time 3 30 sec hold  -TL         Exercise 4    Exercise Name 4 3 way wipes shld #1  -TL      Sets 4 2  -TL      Reps 4 10  -TL         Exercise 5    Exercise Name 5 wall push up  -TL      Sets 5  1  -TL      Reps 5 10  -TL         Exercise 6    Exercise Name 6 vital signs 180/108, after 5 mins of rest 140/98,  -TL      Time 6 106 HR, 94%, 104 HR 96% after rest  -TL            User Key  (r) = Recorded By, (t) = Taken By, (c) = Cosigned By    Initials Name Provider Type    Mer Truong PTA Physical Therapist Assistant                              PT OP Goals     Row Name 11/10/22 1000          PT Short Term Goals    STG Date to Achieve 11/03/22  -TL     STG 1 I with HEP and have additions/changes by next re-certification.  -TL     STG 1 Progress Ongoing;Progressing  -TL     STG 2 Patient to be more aware of posture and posture correction technique.  -TL     STG 2 Progress Ongoing  -TL     STG 3 Patient to have WFL AAROM with 3-way pulley's for improved flexibility.  -TL     STG 3 Progress Ongoing  -TL     STG 4 AROM R Shoulder flexion >= 110°  -TL     STG 4 Progress Ongoing  -TL     STG 5 AROM R Shoulder abduction >= 100°  -TL     STG 5 Progress Ongoing  -TL        Long Term Goals    LTG Date to Achieve 11/17/22  -TL     LTG 1 AROM R Shoulder flexion >= 125°  -TL     LTG 2 AROM R Shoulder abduction >= 115°  -TL     LTG 3 AROM R shoulder ER >= 45° at 80-90° of shoulder abduction.  -TL     LTG 4 AROM R shoulder IR >= 55° at 80-90° of shoulder abduction.  -TL     LTG 5 R UE >= 4/5 in available range.  -TL     LTG 6 Patient able to perform 2 minutes of OH activities with no increase in pain.  -TL     LTG 7 I with final HEP.  -TL        Time Calculation    PT Goal Re-Cert Due Date 11/09/22  -TL           User Key  (r) = Recorded By, (t) = Taken By, (c) = Cosigned By    Initials Name Provider Type    Mer Truong PTA Physical Therapist Assistant                Therapy Education  Education Details: Do not sit on any stools  Given: Fall prevention and home safety  Program: Reinforced  How Provided: Verbal  Provided to: Patient  Level of Understanding: Verbalized              Time Calculation:   Start  Time: 1055  Stop Time: 1130  Time Calculation (min): 35 min  Therapy Charges for Today     Code Description Service Date Service Provider Modifiers Qty    08493499719 HC PT THER PROC EA 15 MIN 11/10/2022 Mer Michelle, KRIS GP, CQ 2                    Mer Michelle PTA  11/10/2022

## 2022-11-13 ENCOUNTER — APPOINTMENT (OUTPATIENT)
Dept: GENERAL RADIOLOGY | Facility: HOSPITAL | Age: 57
End: 2022-11-13

## 2022-11-13 ENCOUNTER — HOSPITAL ENCOUNTER (EMERGENCY)
Facility: HOSPITAL | Age: 57
Discharge: HOME | End: 2022-11-13
Attending: STUDENT IN AN ORGANIZED HEALTH CARE EDUCATION/TRAINING PROGRAM | Admitting: STUDENT IN AN ORGANIZED HEALTH CARE EDUCATION/TRAINING PROGRAM

## 2022-11-13 VITALS
SYSTOLIC BLOOD PRESSURE: 138 MMHG | DIASTOLIC BLOOD PRESSURE: 70 MMHG | TEMPERATURE: 97.6 F | RESPIRATION RATE: 20 BRPM | OXYGEN SATURATION: 99 % | HEART RATE: 111 BPM

## 2022-11-13 DIAGNOSIS — S39.92XA INJURY OF COCCYX, INITIAL ENCOUNTER: Primary | ICD-10-CM

## 2022-11-13 PROCEDURE — 99282 EMERGENCY DEPT VISIT SF MDM: CPT

## 2022-11-13 PROCEDURE — 72170 X-RAY EXAM OF PELVIS: CPT

## 2022-11-13 PROCEDURE — 72220 X-RAY EXAM SACRUM TAILBONE: CPT

## 2022-11-13 NOTE — ED NOTES
fell on thursday at PT, was seen [facility], was told she bruised her tailbone and given a pain shot. Lower back pain since thursday.

## 2022-11-13 NOTE — ED PROVIDER NOTES
Subjective   History of Present Illness  57-year-old female was at physical therapy today doing exercises when she went to sit down and missed the chair.  She landed on her bottom.  She is complaining of tailbone pain.  She went to urgent care and had an x-ray done and was told it was normal.  Patient however continues to have sharp pain And is rating it a 9/10.  Movement makes it worse.  Rest makes it better.  She denies other symptoms.    History provided by:  Patient   used: No        Review of Systems   Constitutional: Negative for chills and fever.   HENT: Negative for drooling.    Eyes: Negative for redness.   Respiratory: Negative for shortness of breath.    Cardiovascular: Negative for chest pain.   Gastrointestinal: Negative for abdominal pain, nausea and vomiting.   Genitourinary: Positive for pelvic pain. Negative for flank pain.   Musculoskeletal: Negative for back pain, joint swelling, myalgias and neck pain.   Skin: Negative for color change.   Neurological: Negative for seizures, weakness, numbness and headaches.   Psychiatric/Behavioral: Negative for confusion.       Past Medical History:   Diagnosis Date   • Arthritis    • Asthma    • Carpal tunnel syndrome 2020   • CHF (congestive heart failure) (HCC)    • COPD (chronic obstructive pulmonary disease) ([facility])    • Diabetes mellitus (HCC)    • Disease of thyroid gland    • Elevated cholesterol    • GERD (gastroesophageal reflux disease)    • Hyperlipidemia    • Hypertension    • Sleep apnea        Allergies   Allergen Reactions   • Adhesive Tape Rash   • Other Rash   • Penicillins Rash, Hives and Itching       Past Surgical History:   Procedure Laterality Date   • CHOLECYSTECTOMY     • CHOLECYSTECTOMY     • FRACTURE SURGERY     • HYSTERECTOMY     • JOINT REPLACEMENT     • KIDNEY STONE SURGERY     • TIBIA FRACTURE SURGERY     • TUBAL ABDOMINAL LIGATION         History reviewed. No pertinent family history.    Social History      Socioeconomic History   • Marital status:    Tobacco Use   • Smoking status: Never   • Smokeless tobacco: Never   Vaping Use   • Vaping Use: Never used   Substance and Sexual Activity   • Alcohol use: No   • Drug use: No           Objective    Vitals:    11/13/22 1445 11/13/22 1542   BP:  138/70   BP Location:  Left arm   Patient Position:  Sitting   Pulse: 111    Resp: 20    Temp: 97.6 °F (36.4 °C)    TempSrc: Oral    SpO2: 99%        Physical Exam  Vitals and nursing note reviewed.   Constitutional:       General: She is not in acute distress.     Appearance: She is well-developed. She is obese. She is not ill-appearing or diaphoretic.   HENT:      Head: Normocephalic.      Right Ear: External ear normal.      Left Ear: External ear normal.   Eyes:      Conjunctiva/sclera: Conjunctivae normal.   Pulmonary:      Effort: Pulmonary effort is normal. No accessory muscle usage or respiratory distress.   Skin:     General: Skin is warm and dry.   Neurological:      Mental Status: She is oriented to person, place, and time.      Sensory: No sensory deficit.      Motor: No weakness.   Psychiatric:         Behavior: Behavior normal.         Procedures           ED Course      XR Sacrum & Coccyx   Final Result   CONCLUSION:   No sacral or coccygeal fracture identified.   Degenerative disc disease L5-S1.   Minimal spondylolisthesis of L5 on S1, perhaps secondary to   spondylolysis.      [id number]      Electronically signed by:  Janak Brown MD  11/13/2022 4:02 PM   CST Workstation: [id number]      XR Pelvis 1 or 2 View   Final Result   CONCLUSION:   No acute fracture or dislocation.      [id number]      Electronically signed by:  Janak Brown MD  11/13/2022 4:06 PM   CST Workstation: [id number]                MDM  Number of Diagnoses or Management Options  Injury of coccyx, initial encounter: new and requires workup  Diagnosis management comments: Vital signs are stable, afebrile.  X-rays negative for acute findings.   Symptomatic care discussed.  Recommend PCP follow-up.  Return precautions given.  Patient states understanding and is agreeable to the plan.      Final diagnoses:   Injury of coccyx, initial encounter       ED Disposition  ED Disposition     ED Disposition   Discharge    Condition   Stable    Comment   --             Nata Morgan, APRN  [address] [place] [address] [phone]    Schedule an appointment as soon as possible for a visit in 2 days  ER follow up         Medication List      No changes were made to your prescriptions during this visit.          Marco Cardenas MD  11/13/22 0406

## 2022-11-14 ENCOUNTER — APPOINTMENT (OUTPATIENT)
Dept: PHYSICAL THERAPY | Facility: HOSPITAL | Age: 57
End: 2022-11-14

## 2022-11-14 DIAGNOSIS — M62.838 MUSCLE SPASM: ICD-10-CM

## 2022-11-14 DIAGNOSIS — Z12.11 SCREENING FOR COLON CANCER: Primary | ICD-10-CM

## 2022-11-14 RX ORDER — CYCLOBENZAPRINE HCL 10 MG
10 TABLET ORAL 2 TIMES DAILY PRN
Qty: 60 TABLET | Refills: 1 | Status: SHIPPED | OUTPATIENT
Start: 2022-11-14 | End: 2023-01-11 | Stop reason: SDUPTHER

## 2022-11-17 ENCOUNTER — APPOINTMENT (OUTPATIENT)
Dept: PHYSICAL THERAPY | Facility: HOSPITAL | Age: 57
End: 2022-11-17

## 2022-11-21 ENCOUNTER — APPOINTMENT (OUTPATIENT)
Dept: PHYSICAL THERAPY | Facility: HOSPITAL | Age: 57
End: 2022-11-21

## 2022-11-23 ENCOUNTER — OFFICE VISIT (OUTPATIENT)
Dept: PODIATRY | Facility: CLINIC | Age: 57
End: 2022-11-23

## 2022-11-23 VITALS — HEIGHT: 72 IN | BODY MASS INDEX: 39.68 KG/M2 | OXYGEN SATURATION: 97 % | HEART RATE: 78 BPM | WEIGHT: 293 LBS

## 2022-11-23 DIAGNOSIS — E11.65 TYPE 2 DIABETES MELLITUS WITH HYPERGLYCEMIA, UNSPECIFIED WHETHER LONG TERM INSULIN USE: ICD-10-CM

## 2022-11-23 DIAGNOSIS — E11.8 DIABETIC FOOT: ICD-10-CM

## 2022-11-23 DIAGNOSIS — M20.42 HAMMER TOES OF BOTH FEET: Primary | ICD-10-CM

## 2022-11-23 DIAGNOSIS — M20.41 HAMMER TOES OF BOTH FEET: Primary | ICD-10-CM

## 2022-11-23 PROCEDURE — 99213 OFFICE O/P EST LOW 20 MIN: CPT | Performed by: NURSE PRACTITIONER

## 2022-11-23 PROCEDURE — 11721 DEBRIDE NAIL 6 OR MORE: CPT | Performed by: NURSE PRACTITIONER

## 2022-11-23 NOTE — PROGRESS NOTES
Janette Chadwick  1965  57 y.o. female   PCP: Nata Morgan 09/30/2022  BS: 169 per pt       11/23/2022    Chief Complaint   Patient presents with   • Left Foot - Follow-up     Diabetic foot exam   • Right Foot - Follow-up     Diabetic foot exam        History of Present Illness    Janette Chadwick is a 57 y.o.female presents to the clinic today for a diabetic foot exam and diabetic foot care       Past Medical History:   Diagnosis Date   • Arthritis    • Asthma    • Carpal tunnel syndrome 2020   • CHF (congestive heart failure) (HCC)    • COPD (chronic obstructive pulmonary disease) (HCC)    • Diabetes mellitus (HCC)    • Disease of thyroid gland    • Elevated cholesterol    • GERD (gastroesophageal reflux disease)    • Hyperlipidemia    • Hypertension    • Sleep apnea          Past Surgical History:   Procedure Laterality Date   • CHOLECYSTECTOMY     • CHOLECYSTECTOMY     • FRACTURE SURGERY     • HYSTERECTOMY     • JOINT REPLACEMENT     • KIDNEY STONE SURGERY     • TIBIA FRACTURE SURGERY     • TUBAL ABDOMINAL LIGATION           History reviewed. No pertinent family history.    Allergies   Allergen Reactions   • Adhesive Tape Rash   • Other Rash   • Penicillins Rash, Hives and Itching       Social History     Socioeconomic History   • Marital status:    Tobacco Use   • Smoking status: Never   • Smokeless tobacco: Never   Vaping Use   • Vaping Use: Never used   Substance and Sexual Activity   • Alcohol use: No   • Drug use: No         Current Outpatient Medications   Medication Sig Dispense Refill   • acetaminophen-codeine (TYLENOL with CODEINE #3) 300-30 MG per tablet Every 6 (Six) Hours.     • albuterol sulfate  (90 Base) MCG/ACT inhaler Inhale 2 puffs Every 4 (Four) Hours As Needed for Wheezing.     • amitriptyline (ELAVIL) 100 MG tablet amitriptyline 100 mg tablet   Take 1 tablet every day by oral route.     • ascorbic acid (VITAMIN C) 100 MG tablet Vitamin C   1000 daily     • aspirin 81  MG EC tablet Take 1 tablet by mouth Daily. 30 tablet 11   • atorvastatin (LIPITOR) 20 MG tablet Take 20 mg by mouth Daily.     • calcium carbonate (OS-DIVYA) 600 MG tablet Take 600 mg by mouth Daily.     • carvedilol (COREG) 3.125 MG tablet Take 3.125 mg by mouth 2 (Two) Times a Day With Meals.     • cetirizine (zyrTEC) 10 MG tablet Take 1 tablet by mouth Daily.     • cholecalciferol (VITAMIN D3) 1000 units tablet Take 2,000 Units by mouth Daily.     • Cobalamin Combinations (B-12) 100-5000 MCG sublingual tablet B12   1000 daily     • cromolyn (OPTICROM) 4 % ophthalmic solution Administer 1 drop to both eyes Daily.     • cyclobenzaprine (FLEXERIL) 10 MG tablet Take 1 tablet by mouth 2 (Two) Times a Day As Needed for Muscle Spasms. 60 tablet 1   • docusate sodium 100 MG capsule Take 1 capsule by mouth Daily. 90 each 0   • DULoxetine (CYMBALTA) 60 MG capsule Take 1 capsule by mouth Daily. 30 capsule 1   • Emgality 120 MG/ML solution prefilled syringe      • empagliflozin (Jardiance) 10 MG tablet tablet Take 1 tablet by mouth Daily. 30 tablet 11   • esomeprazole (nexIUM) 20 MG capsule      • ferrous sulfate 325 (65 FE) MG tablet Take 325 mg by mouth Daily With Breakfast.     • Fetzima 40 MG capsule sustained-release 24 hr TAKE ONE CAPSULE BY MOUTH ONCE EVERY DAY     • fluticasone (FLONASE) 50 MCG/ACT nasal spray 2 sprays into the nostril(s) as directed by provider Daily.     • FREESTYLE LITE test strip USE ONE STRIP TO CHECK BLOOD GLUCOSE TWICE DAILY     • furosemide (LASIX) 40 MG tablet Take 40 mg by mouth Daily.     • hydrOXYzine (ATARAX) 25 MG tablet Take 1 tablet by mouth Daily As Needed for Anxiety. 30 tablet 0   • ibuprofen (ADVIL,MOTRIN) 600 MG tablet TAKE ONE TABLET BY MOUTH EVERY 6 HOURS AS NEEDED FOR PAIN (MILD ONE TO THREE) FOR FOURTEEN DAYS. TAKE WITH FOOD OR MILK. DO NOT EXCEED 3200MG PER DAY     • levothyroxine (SYNTHROID, LEVOTHROID) 150 MCG tablet levothyroxine 150 mcg tablet   Take 1 tablet every day by  "oral route.     • LINZESS 72 MCG capsule capsule Take 72 mcg by mouth Daily.     • lisinopril-hydrochlorothiazide (PRINZIDE,ZESTORETIC) 20-12.5 MG per tablet Take 1 tablet by mouth Daily.     • loratadine (CLARITIN) 10 MG tablet Take 1 tablet by mouth Daily.     • magnesium gluconate (MAGONATE) 500 MG tablet magnesium 500 mg tablet   Take by oral route.     • metFORMIN (GLUCOPHAGE) 1000 MG tablet Take 1,000 mg by mouth 2 (Two) Times a Day With Meals.     • naproxen (NAPROSYN) 500 MG tablet Take 500 mg by mouth As Needed.     • nystatin (MYCOSTATIN) 829993 UNIT/GM cream APPLY ONE APPLICATION TOPICALLY TO  INCISION SITE TWICE DAILY FOR 30 DAYS     • Oral Electrolytes (Emergen-C Electro Mix) pack potassium   daily     • pioglitazone (ACTOS) 15 MG tablet pioglitazone 15 mg tablet   Take 1 tablet every day by oral route.     • potassium chloride 10 MEQ CR tablet Take 1 tablet by mouth Daily. 30 tablet 1   • prochlorperazine (COMPAZINE) 10 MG tablet Take 10 mg by mouth.     • Semaglutide, 1 MG/DOSE, (Ozempic, 1 MG/DOSE,) 4 MG/3ML solution pen-injector Inject 1 mg under the skin into the appropriate area as directed 1 (One) Time Per Week. 3 mL 3   • Symbicort 80-4.5 MCG/ACT inhaler Inhale 2 puffs 2 (Two) Times a Day.     • TiZANidine (ZANAFLEX) 4 MG capsule Take 1 capsule by mouth Every Night. 30 capsule 0   • TRUEplus Lancets 28G misc        No current facility-administered medications for this visit.       Review of Systems   Constitutional: Negative.    HENT: Negative.    Eyes: Negative.    Respiratory: Negative.    Cardiovascular: Negative.    Gastrointestinal: Negative.    Endocrine: Negative.    Genitourinary: Negative.    Musculoskeletal:        Foot pain   Skin: Negative.    Allergic/Immunologic: Negative.    Neurological: Negative.    Hematological: Negative.    Psychiatric/Behavioral: Negative.          OBJECTIVE    Pulse 78   Ht 182.9 cm (72\")   Wt (!) 172 kg (379 lb)   SpO2 97%   BMI 51.40 kg/m² "     Physical Exam  Vitals reviewed.   Constitutional:       Appearance: Normal appearance. She is well-developed.   HENT:      Head: Normocephalic and atraumatic.   Neck:      Trachea: Trachea and phonation normal.   Cardiovascular:      Pulses:           Dorsalis pedis pulses are detected w/ Doppler on the right side and detected w/ Doppler on the left side.        Posterior tibial pulses are detected w/ Doppler on the right side and detected w/ Doppler on the left side.   Pulmonary:      Effort: Pulmonary effort is normal. No respiratory distress.   Abdominal:      General: There is no distension.      Palpations: Abdomen is soft.   Musculoskeletal:      Right foot: Normal range of motion.      Left foot: Normal range of motion.   Feet:      Right foot:      Protective Sensation: 10 sites tested. 10 sites sensed.      Skin integrity: Skin integrity normal.      Toenail Condition: Right toenails are long.      Left foot:      Protective Sensation: 10 sites tested. 10 sites sensed.      Skin integrity: Skin integrity normal.      Toenail Condition: Left toenails are long.   Skin:     General: Skin is warm and dry.   Neurological:      Mental Status: She is alert and oriented to person, place, and time.      GCS: GCS eye subscore is 4. GCS verbal subscore is 5. GCS motor subscore is 6.   Psychiatric:         Speech: Speech normal.         Behavior: Behavior normal. Behavior is cooperative.         Thought Content: Thought content normal.         Judgment: Judgment normal.                Procedures        ASSESSMENT AND PLAN    Diagnoses and all orders for this visit:    1. Hammer toes of both feet (Primary)    2. Diabetic foot (HCC)    3. Type 2 diabetes mellitus with hyperglycemia, unspecified whether long term insulin use (HCC)      - A diabetic foot screening exam was performed and the patient was educated on the foot complications related to diabetes,  preventative foot care and what signs and symptoms to watch  for.  Instructed to contact our office if any foot problems develop before next visit.  -Discussed treatments for  painful toenails. Treatment options discussed included nail removal versus debridement. Patient elected for routine nail debridement. An ABN has been signed by the patient.  - Toenails 1-5 bilateral were debrided in length and thickness with nail nipper and electric  to decrease fungal load and risk of infection.  - All the patients questions were answered.  - RTC 3 months or sooner if needed.           This document has been electronically signed by Arsh CHACON FNP-C, ONP-C on November 23, 2022 13:52 CST

## 2022-11-30 ENCOUNTER — LAB (OUTPATIENT)
Dept: LAB | Facility: HOSPITAL | Age: 57
End: 2022-11-30

## 2022-11-30 ENCOUNTER — HOSPITAL ENCOUNTER (OUTPATIENT)
Dept: PHYSICAL THERAPY | Facility: HOSPITAL | Age: 57
Setting detail: THERAPIES SERIES
Discharge: HOME | End: 2022-11-30

## 2022-11-30 ENCOUNTER — OFFICE VISIT (OUTPATIENT)
Dept: FAMILY MEDICINE CLINIC | Facility: CLINIC | Age: 57
End: 2022-11-30

## 2022-11-30 ENCOUNTER — DOCUMENTATION (OUTPATIENT)
Dept: FAMILY MEDICINE CLINIC | Facility: CLINIC | Age: 57
End: 2022-11-30

## 2022-11-30 VITALS
SYSTOLIC BLOOD PRESSURE: 126 MMHG | WEIGHT: 293 LBS | OXYGEN SATURATION: 97 % | HEART RATE: 102 BPM | DIASTOLIC BLOOD PRESSURE: 72 MMHG | HEIGHT: 72 IN | BODY MASS INDEX: 39.68 KG/M2 | RESPIRATION RATE: 26 BRPM

## 2022-11-30 DIAGNOSIS — R53.1 WEAKNESS: ICD-10-CM

## 2022-11-30 DIAGNOSIS — I10 ESSENTIAL HYPERTENSION: ICD-10-CM

## 2022-11-30 DIAGNOSIS — E11.8 DM (DIABETES MELLITUS), TYPE 2 WITH COMPLICATIONS: Primary | ICD-10-CM

## 2022-11-30 DIAGNOSIS — E78.2 MIXED HYPERLIPIDEMIA: ICD-10-CM

## 2022-11-30 DIAGNOSIS — E03.9 HYPOTHYROIDISM, UNSPECIFIED TYPE: ICD-10-CM

## 2022-11-30 DIAGNOSIS — J44.9 CHRONIC OBSTRUCTIVE PULMONARY DISEASE, UNSPECIFIED COPD TYPE: ICD-10-CM

## 2022-11-30 DIAGNOSIS — M25.511 RIGHT SHOULDER PAIN, UNSPECIFIED CHRONICITY: ICD-10-CM

## 2022-11-30 DIAGNOSIS — Z96.611 STATUS POST REVERSE TOTAL REPLACEMENT OF RIGHT SHOULDER: Primary | ICD-10-CM

## 2022-11-30 DIAGNOSIS — E11.8 DM (DIABETES MELLITUS), TYPE 2 WITH COMPLICATIONS: ICD-10-CM

## 2022-11-30 DIAGNOSIS — E55.9 VITAMIN D DEFICIENCY: ICD-10-CM

## 2022-11-30 DIAGNOSIS — I50.9 CONGESTIVE HEART FAILURE, UNSPECIFIED HF CHRONICITY, UNSPECIFIED HEART FAILURE TYPE: ICD-10-CM

## 2022-11-30 DIAGNOSIS — R29.6 FREQUENT FALLS: ICD-10-CM

## 2022-11-30 DIAGNOSIS — K21.9 GASTRO-ESOPHAGEAL REFLUX DISEASE WITHOUT ESOPHAGITIS: ICD-10-CM

## 2022-11-30 PROBLEM — E11.9 DIABETES: Status: ACTIVE | Noted: 2019-06-24

## 2022-11-30 PROBLEM — G47.30 SLEEP APNEA: Status: ACTIVE | Noted: 2022-07-07

## 2022-11-30 PROBLEM — E83.42 HYPOMAGNESEMIA: Status: ACTIVE | Noted: 2022-07-07

## 2022-11-30 PROBLEM — F33.1 MODERATE RECURRENT MAJOR DEPRESSION (HCC): Status: ACTIVE | Noted: 2022-11-14

## 2022-11-30 PROBLEM — J45.909 ALLERGIC ASTHMA: Status: ACTIVE | Noted: 2022-11-30

## 2022-11-30 PROBLEM — R20.9 SKIN SENSATION DISTURBANCE: Status: ACTIVE | Noted: 2022-11-14

## 2022-11-30 PROBLEM — G56.00 CARPAL TUNNEL SYNDROME: Status: ACTIVE | Noted: 2022-11-14

## 2022-11-30 PROBLEM — M20.42 ACQUIRED HAMMER TOE OF LEFT FOOT: Status: ACTIVE | Noted: 2022-11-14

## 2022-11-30 PROBLEM — E11.41 MONONEUROPATHY DUE TO TYPE 2 DIABETES MELLITUS (HCC): Status: ACTIVE | Noted: 2022-11-14

## 2022-11-30 LAB
25(OH)D3 SERPL-MCNC: 31.4 NG/ML (ref 30–100)
ALBUMIN SERPL-MCNC: 4.6 G/DL (ref 3.5–5.2)
ALBUMIN UR-MCNC: 3.3 MG/DL
ALBUMIN/GLOB SERPL: 1.4 G/DL
ALP SERPL-CCNC: 164 U/L (ref 39–117)
ALT SERPL W P-5'-P-CCNC: 31 U/L (ref 1–33)
ANION GAP SERPL CALCULATED.3IONS-SCNC: 16 MMOL/L (ref 5–15)
AST SERPL-CCNC: 33 U/L (ref 1–32)
BASOPHILS # BLD AUTO: 0.12 10*3/MM3 (ref 0–0.2)
BASOPHILS NFR BLD AUTO: 0.9 % (ref 0–1.5)
BILIRUB SERPL-MCNC: 0.9 MG/DL (ref 0–1.2)
BUN SERPL-MCNC: 19 MG/DL (ref 6–20)
BUN/CREAT SERPL: 16.1 (ref 7–25)
CALCIUM SPEC-SCNC: 10.3 MG/DL (ref 8.6–10.5)
CHLORIDE SERPL-SCNC: 94 MMOL/L (ref 98–107)
CHOLEST SERPL-MCNC: 198 MG/DL (ref 0–200)
CO2 SERPL-SCNC: 26 MMOL/L (ref 22–29)
CREAT SERPL-MCNC: 1.18 MG/DL (ref 0.57–1)
CREAT UR-MCNC: 189.9 MG/DL
DEPRECATED RDW RBC AUTO: 51.7 FL (ref 37–54)
EGFRCR SERPLBLD CKD-EPI 2021: 53.6 ML/MIN/1.73
EOSINOPHIL # BLD AUTO: 0.37 10*3/MM3 (ref 0–0.4)
EOSINOPHIL NFR BLD AUTO: 2.7 % (ref 0.3–6.2)
ERYTHROCYTE [DISTWIDTH] IN BLOOD BY AUTOMATED COUNT: 18.3 % (ref 12.3–15.4)
GLOBULIN UR ELPH-MCNC: 3.3 GM/DL
GLUCOSE SERPL-MCNC: 108 MG/DL (ref 65–99)
HBA1C MFR BLD: 7.3 % (ref 4.8–5.6)
HCT VFR BLD AUTO: 43.8 % (ref 34–46.6)
HCV AB SER DONR QL: NORMAL
HDLC SERPL-MCNC: 36 MG/DL (ref 40–60)
HGB BLD-MCNC: 14.3 G/DL (ref 12–15.9)
IMM GRANULOCYTES # BLD AUTO: 0.08 10*3/MM3 (ref 0–0.05)
IMM GRANULOCYTES NFR BLD AUTO: 0.6 % (ref 0–0.5)
LDLC SERPL CALC-MCNC: 132 MG/DL (ref 0–100)
LDLC/HDLC SERPL: 3.57 {RATIO}
LYMPHOCYTES # BLD AUTO: 3.08 10*3/MM3 (ref 0.7–3.1)
LYMPHOCYTES NFR BLD AUTO: 22.7 % (ref 19.6–45.3)
MCH RBC QN AUTO: 26.2 PG (ref 26.6–33)
MCHC RBC AUTO-ENTMCNC: 32.6 G/DL (ref 31.5–35.7)
MCV RBC AUTO: 80.4 FL (ref 79–97)
MICROALBUMIN/CREAT UR: 17.4 MG/G
MONOCYTES # BLD AUTO: 0.91 10*3/MM3 (ref 0.1–0.9)
MONOCYTES NFR BLD AUTO: 6.7 % (ref 5–12)
NEUTROPHILS NFR BLD AUTO: 66.4 % (ref 42.7–76)
NEUTROPHILS NFR BLD AUTO: 9.01 10*3/MM3 (ref 1.7–7)
NRBC BLD AUTO-RTO: 0 /100 WBC (ref 0–0.2)
NT-PROBNP SERPL-MCNC: 5.1 PG/ML (ref 0–900)
PLATELET # BLD AUTO: 474 10*3/MM3 (ref 140–450)
PMV BLD AUTO: 10.3 FL (ref 6–12)
POTASSIUM SERPL-SCNC: 4.2 MMOL/L (ref 3.5–5.2)
PROT SERPL-MCNC: 7.9 G/DL (ref 6–8.5)
RBC # BLD AUTO: 5.45 10*6/MM3 (ref 3.77–5.28)
SODIUM SERPL-SCNC: 136 MMOL/L (ref 136–145)
TRIGL SERPL-MCNC: 167 MG/DL (ref 0–150)
TSH SERPL DL<=0.05 MIU/L-ACNC: 8.33 UIU/ML (ref 0.27–4.2)
VIT B12 BLD-MCNC: 806 PG/ML (ref 211–946)
VLDLC SERPL-MCNC: 30 MG/DL (ref 5–40)
WBC NRBC COR # BLD: 13.57 10*3/MM3 (ref 3.4–10.8)

## 2022-11-30 PROCEDURE — 86803 HEPATITIS C AB TEST: CPT

## 2022-11-30 PROCEDURE — 82306 VITAMIN D 25 HYDROXY: CPT

## 2022-11-30 PROCEDURE — 83880 ASSAY OF NATRIURETIC PEPTIDE: CPT | Performed by: INTERNAL MEDICINE

## 2022-11-30 PROCEDURE — 1159F MED LIST DOCD IN RCRD: CPT | Performed by: NURSE PRACTITIONER

## 2022-11-30 PROCEDURE — 80061 LIPID PANEL: CPT

## 2022-11-30 PROCEDURE — G0402 INITIAL PREVENTIVE EXAM: HCPCS | Performed by: NURSE PRACTITIONER

## 2022-11-30 PROCEDURE — 80053 COMPREHEN METABOLIC PANEL: CPT

## 2022-11-30 PROCEDURE — 1160F RVW MEDS BY RX/DR IN RCRD: CPT | Performed by: NURSE PRACTITIONER

## 2022-11-30 PROCEDURE — 1170F FXNL STATUS ASSESSED: CPT | Performed by: NURSE PRACTITIONER

## 2022-11-30 PROCEDURE — 84443 ASSAY THYROID STIM HORMONE: CPT

## 2022-11-30 PROCEDURE — 85025 COMPLETE CBC W/AUTO DIFF WBC: CPT

## 2022-11-30 PROCEDURE — 36415 COLL VENOUS BLD VENIPUNCTURE: CPT

## 2022-11-30 PROCEDURE — 97110 THERAPEUTIC EXERCISES: CPT | Performed by: PHYSICAL THERAPIST

## 2022-11-30 PROCEDURE — 82607 VITAMIN B-12: CPT

## 2022-11-30 PROCEDURE — 82043 UR ALBUMIN QUANTITATIVE: CPT

## 2022-11-30 PROCEDURE — 82570 ASSAY OF URINE CREATININE: CPT

## 2022-11-30 PROCEDURE — 83036 HEMOGLOBIN GLYCOSYLATED A1C: CPT

## 2022-11-30 RX ORDER — LINACLOTIDE 72 UG/1
72 CAPSULE, GELATIN COATED ORAL DAILY
Qty: 30 CAPSULE | Refills: 0 | Status: SHIPPED | OUTPATIENT
Start: 2022-11-30 | End: 2022-11-30

## 2022-11-30 RX ORDER — MELOXICAM 15 MG/1
15 TABLET ORAL DAILY
Qty: 30 TABLET | Refills: 0 | Status: SHIPPED | OUTPATIENT
Start: 2022-11-30 | End: 2023-01-09 | Stop reason: SDUPTHER

## 2022-11-30 RX ORDER — TIRZEPATIDE 5 MG/.5ML
5 INJECTION, SOLUTION SUBCUTANEOUS WEEKLY
Qty: 2 ML | Refills: 0 | Status: SHIPPED | OUTPATIENT
Start: 2022-11-30 | End: 2023-01-11

## 2022-11-30 RX ORDER — LINACLOTIDE 72 UG/1
72 CAPSULE, GELATIN COATED ORAL DAILY
Qty: 30 CAPSULE | Refills: 6 | Status: SHIPPED | OUTPATIENT
Start: 2022-11-30 | End: 2023-01-11 | Stop reason: SDUPTHER

## 2022-11-30 RX ORDER — GLUCOSAM/CHON-MSM1/C/MANG/BOSW 500-416.6
TABLET ORAL
Qty: 100 EACH | Refills: 11 | Status: SHIPPED | OUTPATIENT
Start: 2022-11-30

## 2022-12-01 NOTE — THERAPY PROGRESS REPORT/RE-CERT
Outpatient Physical Therapy Ortho Progress Note  [facility]     Patient Name: Janette Chadwick  : 1965  MRN: [id number]  Today's Date: 2022      Visit Date: 2022  Subjective Improvement: 45%  MD visit: MAKAYLA  Visit Number:   Total Approved:18  Recert Date: 2022  Visit Dx:    ICD-10-CM ICD-9-CM   1. Status post reverse total replacement of right shoulder  Z96.611 V43.61   2. Right shoulder pain, unspecified chronicity  M25.511 719.41       Patient Active Problem List   Diagnosis   • Chest pain   • Diabetes ([facility])   • Mixed hyperlipidemia   • Essential hypertension   • Chronic obstructive pulmonary disease ([facility])   • Refractory migraine without aura   • Chronic migraine   • Body mass index (BMI) 45.0-49.9, adult ([facility])   • Pain in right upper arm   • Acquired hypothyroidism   • Abnormal cardiovascular stress test   • CHF (congestive heart failure) ([facility])   • Constipation   • Diabetic neuropathy ([facility])   • Diastolic dysfunction   • Dyspnea on exertion   • Gastro-esophageal reflux disease without esophagitis   • Headache   • Hearing loss   • Hyperglycemia due to type 2 diabetes mellitus ([facility])   • Insomnia   • Depression with anxiety   • Never smoked tobacco   • Sleep apnea   • Osteoarthritis of knee   • History of sleep apnea   • Severe obesity ([facility])   • Polyarthritis   • Type 2 diabetes mellitus without complication ([facility])   • Vitamin D deficiency   • Peripheral vascular disease ([facility])   • Localized swelling of both lower legs   • Acquired hammer toe of left foot   • Allergic asthma   • Carpal tunnel syndrome   • Hypomagnesemia   • Moderate recurrent major depression ([facility])   • Mononeuropathy due to type 2 diabetes mellitus ([facility])   • Skin sensation disturbance        Past Medical History:   Diagnosis Date   • Arthritis    • Asthma    • Carpal tunnel syndrome    • CHF (congestive heart failure) ([facility])    • COPD (chronic obstructive pulmonary disease) ([facility])    • Diabetes mellitus ([facility])     • Disease of thyroid gland    • Elevated cholesterol    • GERD (gastroesophageal reflux disease)    • Hyperlipidemia    • Hypertension    • Sleep apnea         Past Surgical History:   Procedure Laterality Date   • CHOLECYSTECTOMY     • CHOLECYSTECTOMY     • FRACTURE SURGERY     • HYSTERECTOMY     • JOINT REPLACEMENT     • KIDNEY STONE SURGERY     • TIBIA FRACTURE SURGERY     • TUBAL ABDOMINAL LIGATION          PT Ortho     Row Name 11/30/22 0900       Right Upper Ext    Rt Shoulder Abduction AROM 80  -BS    Rt Shoulder Flexion AROM 112  -BS    Rt Shoulder External Rotation AROM 20  -BS    Rt Shoulder Internal Rotation AROM 80  -BS    Row Name 11/30/22 5553       Subjective Comments    Subjective Comments Has had 2 falls over the past 2 weeks, including one in the clinic here. X-ray showing a bruised tailbone but no fracture. Has been on her back the past 2 weeks due to severe coccygeal pain.  -BS       Precautions and Contraindications    Precautions R RTSA  -BS       Subjective Pain    Able to rate subjective pain? yes  -BS    Pre-Treatment Pain Level 3  -BS    Post-Treatment Pain Level 5  -BS       Right Upper Ext    Rt Shoulder Abduction AROM 80  -BS    Rt Shoulder Flexion AROM 112  -BS    Rt Shoulder External Rotation AROM 20  -BS    Rt Shoulder Internal Rotation AROM 80  -BS       MMT (Manual Muscle Testing)    General MMT Comments R shoulder-flex 3+/5 abd 3+/5 ER 3+/5 IR 4/5  -BS          User Key  (r) = Recorded By, (t) = Taken By, (c) = Cosigned By    Initials Name Provider Type    Bola Pablo, PT Physical Therapist                             PT Assessment/Plan     Row Name 11/30/22 8453          PT Assessment    Assessment Comments Slow progress made toward goals in part due to off PT for the past 3 weeks after recent fall in the clinic 20 days ago. Would benefit from ongoing skilled PT to maximize functional potential.  -BS     Patient/caregiver participated in establishment of treatment plan and  goals Yes  -BS     Patient would benefit from skilled therapy intervention Yes  -BS        PT Plan    PT Frequency 2x/week  -BS     Predicted Duration of Therapy Intervention (PT) 16-20 weeks  -BS     PT Plan Comments continue with gentle strengthening to the R shoulder. Re-check Quick Dash score next session.  -BS           User Key  (r) = Recorded By, (t) = Taken By, (c) = Cosigned By    Initials Name Provider Type    BS Bola Dumont, PT Physical Therapist                   OP Exercises     Row Name 11/30/22 8997             Subjective Comments    Subjective Comments Has had 2 falls over the past 2 weeks, including one in the clinic here. X-ray showing a bruised tailbone but no fracture. Has been on her back the past 2 weeks due to severe coccygeal pain.  -BS         Subjective Pain    Able to rate subjective pain? yes  -BS      Pre-Treatment Pain Level 3  -BS      Post-Treatment Pain Level 5  -BS         Exercise 1    Exercise Name 1 pro ll strengthening  -BS      Time 1 8' total  -BS         Exercise 2    Exercise Name 2 supine R shoulder flex AAROM with dowel  -BS      Sets 2 1  -BS      Reps 2 15  -BS         Exercise 3    Exercise Name 3 PROM with clinician, supine  -BS      Time 3 10 mins  -BS      Additional Comments R shoulder  -BS         Exercise 4    Exercise Name 4 supine R shoulder ER/IR AAROM with dowel  -BS      Sets 4 1  -BS      Reps 4 15  -BS         Exercise 5    Exercise Name 5 MMT/ROM reassessment  -BS      Time 5 3'  -BS         Exercise 6    Exercise Name 6 seated R shoulder flex AROM to 90°  -BS      Sets 6 1  -BS      Reps 6 10  -BS         Exercise 7    Exercise Name 7 seated R shoulder abduction AROM  -BS      Sets 7 1  -BS      Reps 7 10  -BS         Exercise 8    Exercise Name 8 deferred ice  -BS            User Key  (r) = Recorded By, (t) = Taken By, (c) = Cosigned By    Initials Name Provider Type    Bola Pablo, PT Physical Therapist                              PT OP Goals      Row Name 11/30/22 0853          PT Short Term Goals    STG Date to Achieve 12/14/22  -BS     STG 1 I with HEP and have additions/changes by next re-certification.  -BS     STG 1 Progress Ongoing;Progressing  -BS     STG 2 Patient to be more aware of posture and posture correction technique.  -BS     STG 2 Progress Ongoing  -BS     STG 3 Patient to have WFL AAROM with 3-way pulley's for improved flexibility.  -BS     STG 3 Progress Ongoing  -BS     STG 4 AROM R Shoulder flexion >= 110°  -BS     STG 4 Progress Ongoing  -BS     STG 5 AROM R Shoulder abduction >= 100°  -BS     STG 5 Progress Ongoing  -BS        Long Term Goals    LTG Date to Achieve 12/28/22  -BS     LTG 1 AROM R Shoulder flexion >= 125°  -BS     LTG 1 Progress Ongoing  -BS     LTG 2 AROM R Shoulder abduction >= 115°  -BS     LTG 2 Progress Ongoing;Progressing  -BS     LTG 3 AROM R shoulder ER >= 45° at 80-90° of shoulder abduction.  -BS     LTG 3 Progress Ongoing  -BS     LTG 4 AROM R shoulder IR >= 55° at 80-90° of shoulder abduction.  -BS     LTG 5 R UE >= 4/5 in available range.  -BS     LTG 5 Progress Ongoing;Progressing  -BS     LTG 6 Patient able to perform 2 minutes of OH activities with no increase in pain.  -BS     LTG 6 Progress Ongoing  -BS     LTG 7 I with final HEP.  -BS     LTG 7 Progress Ongoing  -BS        Time Calculation    PT Goal Re-Cert Due Date 12/21/22  -BS           User Key  (r) = Recorded By, (t) = Taken By, (c) = Cosigned By    Initials Name Provider Type    Bola Pablo, PT Physical Therapist                               Time Calculation:   Start Time: 0853  Stop Time: 0931  Time Calculation (min): 38 min  Total Timed Code Minutes- PT: 38 minute(s)  Therapy Charges for Today     Code Description Service Date Service Provider Modifiers Qty    [id number] HC PT THER PROC EA 15 MIN 11/30/2022 Bola Dumont, PT GP 3                    Bola Dumont, PT  11/30/2022

## 2022-12-05 ENCOUNTER — OFFICE VISIT (OUTPATIENT)
Dept: PULMONOLOGY | Facility: CLINIC | Age: 57
End: 2022-12-05

## 2022-12-05 VITALS
OXYGEN SATURATION: 96 % | HEIGHT: 72 IN | DIASTOLIC BLOOD PRESSURE: 92 MMHG | WEIGHT: 293 LBS | HEART RATE: 105 BPM | SYSTOLIC BLOOD PRESSURE: 138 MMHG | BODY MASS INDEX: 39.68 KG/M2

## 2022-12-05 DIAGNOSIS — J45.20 MILD INTERMITTENT ASTHMA WITHOUT COMPLICATION: Primary | ICD-10-CM

## 2022-12-05 DIAGNOSIS — E11.8 DM (DIABETES MELLITUS), TYPE 2 WITH COMPLICATIONS: Primary | ICD-10-CM

## 2022-12-05 PROBLEM — J44.9 CHRONIC OBSTRUCTIVE PULMONARY DISEASE: Status: RESOLVED | Noted: 2019-06-24 | Resolved: 2022-12-05

## 2022-12-05 PROCEDURE — 99203 OFFICE O/P NEW LOW 30 MIN: CPT | Performed by: INTERNAL MEDICINE

## 2022-12-05 RX ORDER — SYRINGE WITH NEEDLE, 1 ML 28GX1/2"
SYRINGE, EMPTY DISPOSABLE MISCELLANEOUS
Qty: 1 EACH | Refills: 3 | Status: SHIPPED | OUTPATIENT
Start: 2022-12-05

## 2022-12-05 RX ORDER — SEMAGLUTIDE 1.34 MG/ML
INJECTION, SOLUTION SUBCUTANEOUS WEEKLY
COMMUNITY
End: 2023-01-09 | Stop reason: SDUPTHER

## 2022-12-05 NOTE — PROGRESS NOTES
"    Pulmonary Consultation    Nata Morgan APRN,    Thank you for asking me to see Janette Chadwick for   Chief Complaint   Patient presents with   • COPD     Chief complaint   .      History of Present Illness  Janette Chadwick is a 57 y.o. female     12/5/22  Patient referred from [facility] for \"COPD\"    Patient tells me that she has a history of asthma and has recently moved up here and is trying to get established with new specialists in this area    Patient is on Symbicort and albuterol. 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 Also cigarette smoke. She has not had an exacerbation in years as she is no longer around these    She broke her coccyx early Nov and has had a hard time getting around since then. Broke her shoulder back in March. Wasn't very active prior to that      Tobacco use history:  Never smoked      Review of Systems: History obtained from chart review and the patient.  Review of Systems  As described in the HPI. Otherwise, remainder of ROS (14 systems) were negative.    Patient Active Problem List   Diagnosis   • Chest pain   • Diabetes ([facility])   • Mixed hyperlipidemia   • Essential hypertension   • Refractory migraine without aura   • Chronic migraine   • Body mass index (BMI) 45.0-49.9, adult ([facility])   • Pain in right upper arm   • Acquired hypothyroidism   • Abnormal cardiovascular stress test   • CHF (congestive heart failure) ([facility])   • Constipation   • Diabetic neuropathy ([facility])   • Diastolic dysfunction   • Dyspnea on exertion   • Gastro-esophageal reflux disease without esophagitis   • Headache   • Hearing loss   • Hyperglycemia due to type 2 diabetes mellitus ([facility])   • Insomnia   • Depression with anxiety   • Never smoked tobacco   • Sleep apnea   • Osteoarthritis of knee   • History of sleep apnea "   • Severe obesity (HCC)   • Polyarthritis   • Type 2 diabetes mellitus without complication (HCC)   • Vitamin D deficiency   • Peripheral vascular disease (HCC)   • Localized swelling of both lower legs   • Acquired hammer toe of left foot   • Allergic asthma   • Carpal tunnel syndrome   • Hypomagnesemia   • Moderate recurrent major depression (HCC)   • Mononeuropathy due to type 2 diabetes mellitus (HCC)   • Skin sensation disturbance         Current Outpatient Medications:   •  albuterol sulfate  (90 Base) MCG/ACT inhaler, Inhale 2 puffs Every 4 (Four) Hours As Needed for Wheezing., Disp: , Rfl:   •  amitriptyline (ELAVIL) 100 MG tablet, amitriptyline 100 mg tablet  Take 1 tablet every day by oral route., Disp: , Rfl:   •  ascorbic acid (VITAMIN C) 100 MG tablet, Vitamin C  1000 daily, Disp: , Rfl:   •  aspirin 81 MG EC tablet, Take 1 tablet by mouth Daily., Disp: 30 tablet, Rfl: 11  •  atorvastatin (LIPITOR) 20 MG tablet, Take 20 mg by mouth Daily., Disp: , Rfl:   •  calcium carbonate (OS-DIVYA) 600 MG tablet, Take 600 mg by mouth Daily., Disp: , Rfl:   •  carvedilol (COREG) 3.125 MG tablet, Take 3.125 mg by mouth 2 (Two) Times a Day With Meals., Disp: , Rfl:   •  cetirizine (zyrTEC) 10 MG tablet, Take 1 tablet by mouth Daily., Disp: , Rfl:   •  cholecalciferol (VITAMIN D3) 1000 units tablet, Take 2,000 Units by mouth Daily., Disp: , Rfl:   •  Cobalamin Combinations (B-12) 100-5000 MCG sublingual tablet, B12  1000 daily, Disp: , Rfl:   •  cromolyn (OPTICROM) 4 % ophthalmic solution, Administer 1 drop to both eyes Daily., Disp: , Rfl:   •  cyclobenzaprine (FLEXERIL) 10 MG tablet, Take 1 tablet by mouth 2 (Two) Times a Day As Needed for Muscle Spasms., Disp: 60 tablet, Rfl: 1  •  docusate sodium 100 MG capsule, Take 1 capsule by mouth Daily., Disp: 90 each, Rfl: 0  •  DULoxetine (CYMBALTA) 60 MG capsule, Take 1 capsule by mouth Daily., Disp: 30 capsule, Rfl: 1  •  Emgality 120 MG/ML solution prefilled  syringe, , Disp: , Rfl:   •  empagliflozin (Jardiance) 10 MG tablet tablet, Take 1 tablet by mouth Daily., Disp: 30 tablet, Rfl: 11  •  esomeprazole (nexIUM) 20 MG capsule, , Disp: , Rfl:   •  ferrous sulfate 325 (65 FE) MG tablet, Take 325 mg by mouth Daily With Breakfast., Disp: , Rfl:   •  Fetzima 40 MG capsule sustained-release 24 hr, TAKE ONE CAPSULE BY MOUTH ONCE EVERY DAY, Disp: , Rfl:   •  fluticasone (FLONASE) 50 MCG/ACT nasal spray, 2 sprays into the nostril(s) as directed by provider Daily., Disp: , Rfl:   •  FREESTYLE LITE test strip, USE ONE STRIP TO CHECK BLOOD GLUCOSE TWICE DAILY, Disp: , Rfl:   •  furosemide (LASIX) 40 MG tablet, Take 40 mg by mouth Daily., Disp: , Rfl:   •  glucose blood (IGlucose Test Strips) test strip, Use as instructed, Disp: 50 each, Rfl: 11  •  hydrOXYzine (ATARAX) 25 MG tablet, Take 1 tablet by mouth Daily As Needed for Anxiety., Disp: 30 tablet, Rfl: 0  •  levothyroxine (SYNTHROID, LEVOTHROID) 150 MCG tablet, levothyroxine 150 mcg tablet  Take 1 tablet every day by oral route., Disp: , Rfl:   •  Linzess 72 MCG capsule capsule, Take 1 capsule by mouth Daily., Disp: 30 capsule, Rfl: 6  •  lisinopril-hydrochlorothiazide (PRINZIDE,ZESTORETIC) 20-12.5 MG per tablet, Take 1 tablet by mouth Daily., Disp: , Rfl:   •  loratadine (CLARITIN) 10 MG tablet, Take 1 tablet by mouth Daily., Disp: , Rfl:   •  magnesium gluconate (MAGONATE) 500 MG tablet, magnesium 500 mg tablet  Take by oral route., Disp: , Rfl:   •  meloxicam (Mobic) 15 MG tablet, Take 1 tablet by mouth Daily., Disp: 30 tablet, Rfl: 0  •  metFORMIN (GLUCOPHAGE) 1000 MG tablet, Take 1,000 mg by mouth 2 (Two) Times a Day With Meals., Disp: , Rfl:   •  naproxen (NAPROSYN) 500 MG tablet, Take 500 mg by mouth As Needed., Disp: , Rfl:   •  nystatin (MYCOSTATIN) 106150 UNIT/GM cream, APPLY ONE APPLICATION TOPICALLY TO  INCISION SITE TWICE DAILY FOR 30 DAYS, Disp: , Rfl:   •  pioglitazone (ACTOS) 15 MG tablet, pioglitazone  15 mg tablet  Take 1 tablet every day by oral route., Disp: , Rfl:   •  potassium chloride 10 MEQ CR tablet, Take 1 tablet by mouth Daily., Disp: 30 tablet, Rfl: 1  •  prochlorperazine (COMPAZINE) 10 MG tablet, Take 10 mg by mouth., Disp: , Rfl:   •  Semaglutide, 1 MG/DOSE, (Ozempic, 1 MG/DOSE,) 2 MG/1.5ML solution pen-injector, Inject  under the skin into the appropriate area as directed 1 (One) Time Per Week., Disp: , Rfl:   •  Symbicort 80-4.5 MCG/ACT inhaler, Inhale 2 puffs 2 (Two) Times a Day., Disp: , Rfl:   •  TiZANidine (ZANAFLEX) 4 MG capsule, Take 1 capsule by mouth Every Night., Disp: 30 capsule, Rfl: 0  •  TRUEplus Lancets 28G misc, Use up to 4 times daily to check blood sugar, Disp: 100 each, Rfl: 11  •  Tirzepatide (Mounjaro) 5 MG/0.5ML solution pen-injector, Inject 0.5 mL under the skin into the appropriate area as directed 1 (One) Time Per Week., Disp: 2 mL, Rfl: 0    Allergies   Allergen Reactions   • Adhesive Tape Rash   • Other Rash   • Penicillins Rash, Hives and Itching       Past Medical History:   Diagnosis Date   • Arthritis    • Asthma    • Carpal tunnel syndrome 2020   • CHF (congestive heart failure) ([facility])    • COPD (chronic obstructive pulmonary disease) ([facility])    • Diabetes mellitus ([facility])    • Disease of thyroid gland    • Elevated cholesterol    • GERD (gastroesophageal reflux disease)    • Hyperlipidemia    • Hypertension    • Sleep apnea      Past Surgical History:   Procedure Laterality Date   • CHOLECYSTECTOMY     • CHOLECYSTECTOMY     • FRACTURE SURGERY     • HYSTERECTOMY     • JOINT REPLACEMENT     • KIDNEY STONE SURGERY     • TIBIA FRACTURE SURGERY     • TUBAL ABDOMINAL LIGATION       Social History     Socioeconomic History   • Marital status:    Tobacco Use   • Smoking status: Never   • Smokeless tobacco: Never   Vaping Use   • Vaping Use: Never used   Substance and Sexual Activity   • Alcohol use: No   • Drug use: No   • Sexual activity: Defer     History reviewed. No  "pertinent family history.       Objective     Blood pressure 138/92, pulse 105, height 182.9 cm (72\"), weight (!) 166 kg (366 lb 11.2 oz), SpO2 96 %.  Physical Exam  Vitals reviewed.   Constitutional:       Appearance: Normal appearance. She is obese.   HENT:      Head: Normocephalic and atraumatic.      Nose: Nose normal.      Mouth/Throat:      Mouth: Mucous membranes are moist.      Pharynx: Oropharynx is clear.   Eyes:      Conjunctiva/sclera: Conjunctivae normal.      Pupils: Pupils are equal, round, and reactive to light.   Cardiovascular:      Rate and Rhythm: Normal rate and regular rhythm.      Pulses: Normal pulses.      Heart sounds: Normal heart sounds.   Pulmonary:      Effort: Pulmonary effort is normal.      Breath sounds: Normal breath sounds.   Abdominal:      General: Abdomen is flat. Bowel sounds are normal.      Palpations: Abdomen is soft.   Musculoskeletal:         General: Normal range of motion.      Cervical back: Normal range of motion.   Skin:     General: Skin is warm and dry.   Neurological:      General: No focal deficit present.      Mental Status: She is alert and oriented to person, place, and time.   Psychiatric:         Mood and Affect: Mood normal.         Behavior: Behavior normal.         PFTs:  (independently reviewed and interpreted by me)  None    Radiology (independently reviewed and interpreted by me):   None     Assessment & Plan     Diagnoses and all orders for this visit:    1. Mild intermittent asthma without complication (Primary)         Discussion/ Recommendations:   Patient with reported history of asthma with some  Very specific triggers. Doesn't sound like she's had any problems with this recently. We discussed that she should be on the least amount of medication needed to control her symptoms and right now, I think she is over medicated. Recommend she hold the Symbicort and just utilize albuterol if needed. Will follow up in a month and see how she is going  Also " need to get her records from Dr Frances office    -Hold Symbicort for now  -Continue prn albuterol  -Records form prior pulm to include PFTs             Return in about 4 weeks (around 1/2/2023) for f/u asthma.      Thank you for allowing me to participate in the care of Janette Chadwick. Please do not hesitate to contact me with any questions.         This document has been electronically signed by Amarilis Porter DO on December 5, 2022 11:07 CST

## 2022-12-06 DIAGNOSIS — E03.9 HYPOTHYROIDISM, UNSPECIFIED TYPE: Primary | ICD-10-CM

## 2022-12-06 RX ORDER — LEVOTHYROXINE SODIUM 175 UG/1
175 TABLET ORAL DAILY
Qty: 30 TABLET | Refills: 11 | Status: SHIPPED | OUTPATIENT
Start: 2022-12-06 | End: 2023-01-11 | Stop reason: SDUPTHER

## 2022-12-07 ENCOUNTER — TELEPHONE (OUTPATIENT)
Dept: PHYSICAL THERAPY | Facility: HOSPITAL | Age: 57
End: 2022-12-07

## 2022-12-09 ENCOUNTER — PATIENT ROUNDING (BHMG ONLY) (OUTPATIENT)
Dept: CARDIOLOGY | Facility: CLINIC | Age: 57
End: 2022-12-09

## 2022-12-16 ENCOUNTER — NURSE TRIAGE (OUTPATIENT)
Dept: CALL CENTER | Facility: HOSPITAL | Age: 57
End: 2022-12-16

## 2022-12-16 NOTE — TELEPHONE ENCOUNTER
"Patient called, woke up late at 11pm checked blood glucose was 45 ate 2 lo kisses, family with her, called [facility] wanting to know when she should go to the emergency room. Agent advised per hypoglycemia protocol. Patient verbalized understanding. Rechecked blood glucose no 85. Agent advised per protocol to drink 1/2 cup of juice and eat peanut butter for protein which will have long lasting sable effects on her blood glucose.      Reason for Disposition  • [1] Blood glucose < 70  mg/dL (3.9 mmol/L) or symptomatic, now improved with Care Advice AND [2] cause unknown    Additional Information  • Negative: Unconscious or difficult to awaken  • Negative: Seizure occurs  • Negative: Acting confused (e.g., disoriented, slurred speech)  • Negative: Very weak (e.g., can't stand)  • Negative: Sounds like a life-threatening emergency to the triager  • Negative: [1] Vomiting AND [2] signs of dehydration (e.g., very dry mouth, lightheaded, dark urine)  • Negative: [1] Low blood sugar symptoms persist > 30 minutes AND [2] using low blood sugar Care Advice  • Negative: [1] Low blood glucose (< 70 mg/dL  or 3.9 mmol/L) persists > 30 minutes AND [2] using low blood sugar Care Advice  • Negative: Patient sounds very sick or weak to the triager  • Negative: [1] Low blood sugar symptoms with no other adult present AND [2] hasn't tried Care Advice  • Negative: [1] Low blood glucose (< 70 mg/dL  or 3.9 mmol/L) with no other adult present AND [2] hasn't tried Care Advice  • Negative: Diabetes drug error or overdose (e.g., insulin error or extra dose)  • Negative: [1] Caller has URGENT medication or insulin pump question AND [2] triager unable to answer question    Answer Assessment - Initial Assessment Questions  1. SYMPTOMS: \"What symptoms are you concerned about?\"      Blood Glucose 45 took 2 lo kisses blood glucose 85  2. ONSET:  \"When did the symptoms start?\"  This morning about an hour ago  3. BLOOD GLUCOSE: " "\"What is your blood glucose level?\"    85  4. USUAL RANGE: \"What is your blood glucose level usually?\" (e.g., usual fasting morning value, usual evening value)  109 fasting   5. TYPE 1 or 2:  \"Do you know what type of diabetes you have?\"  (e.g., Type 1, Type 2, Gestational; doesn't know)     Type 2  6. INSULIN: \"Do you take insulin?\" \"What type of insulin(s) do you use? What is the mode of delivery? (syringe, pen; injection or pump) \"When did you last give yourself an insulin dose?\" (i.e., time or hours/minutes ago) \"How much did you give?\" (i.e., how many units)  No   7. DIABETES PILLS: \"Do you take any pills for your diabetes?\"   Metformin, Ozempic, Jardiance, pioglitazone   8. OTHER SYMPTOMS: \"Do you have any symptoms?\" (e.g., fever, frequent urination, difficulty breathing, vomiting)  NO  9. LOW BLOOD GLUCOSE TREATMENT: \"What have you done so far to treat the low blood glucose level?\"  2 lo kisses  10. FOOD: \"When did you last eat or drink?\"  Yesterday evening   11. ALONE: \"Are you alone right now or is someone with you?\"   Has family with you  12. PREGNANCY: \"Is there any chance you are pregnant?\" \"When was your last menstrual period?\"  No    Protocols used: DIABETES - LOW BLOOD SUGAR-ADULT-AH      "

## 2022-12-16 NOTE — TELEPHONE ENCOUNTER
Reason for Disposition  • Message left on identified voice mail    Additional Information  • Negative: Caller is angry or rude (e.g., hangs up, verbally abusive, yelling)  • Negative: Caller hangs up  • Negative: Caller has already spoken with the PCP and has no further questions.  • Negative: Caller has already spoken with another triager and has no further questions.  • Negative: Caller has already spoken with another triager or PCP AND has further questions AND triager able to answer questions.  • Negative: Busy signal.  First attempt to contact caller.  Follow-up call scheduled within 15 minutes.  • Negative: No answer.  First attempt to contact caller.  Follow-up call scheduled within 15 minutes.    Answer Assessment - Initial Assessment Questions  Left VM for patient    Protocols used: NO CONTACT OR DUPLICATE CONTACT CALL-[facility]

## 2022-12-21 RX ORDER — DULOXETIN HYDROCHLORIDE 60 MG/1
CAPSULE, DELAYED RELEASE ORAL
Qty: 30 CAPSULE | Refills: 0 | Status: SHIPPED | OUTPATIENT
Start: 2022-12-21 | End: 2023-01-11 | Stop reason: SDUPTHER

## 2022-12-21 RX ORDER — POTASSIUM CHLORIDE 750 MG/1
TABLET, FILM COATED, EXTENDED RELEASE ORAL
Qty: 30 TABLET | Refills: 0 | Status: SHIPPED | OUTPATIENT
Start: 2022-12-21 | End: 2023-01-20

## 2022-12-28 RX ORDER — LORATADINE 10 MG/1
TABLET ORAL
Qty: 30 TABLET | Refills: 0 | Status: SHIPPED | OUTPATIENT
Start: 2022-12-28 | End: 2023-01-11 | Stop reason: SDUPTHER

## 2022-12-28 RX ORDER — AMITRIPTYLINE HYDROCHLORIDE 100 MG/1
TABLET, FILM COATED ORAL
Qty: 30 TABLET | Refills: 0 | Status: SHIPPED | OUTPATIENT
Start: 2022-12-28 | End: 2023-01-09 | Stop reason: SDUPTHER

## 2022-12-28 RX ORDER — ASCORBIC ACID 500 MG
TABLET ORAL
Qty: 60 TABLET | Refills: 0 | Status: SHIPPED | OUTPATIENT
Start: 2022-12-28 | End: 2023-01-25

## 2022-12-29 RX ORDER — TIZANIDINE 4 MG/1
TABLET ORAL
Qty: 30 TABLET | Refills: 0 | Status: SHIPPED | OUTPATIENT
Start: 2022-12-29 | End: 2023-01-11

## 2023-01-09 ENCOUNTER — OFFICE VISIT (OUTPATIENT)
Dept: FAMILY MEDICINE CLINIC | Facility: CLINIC | Age: 58
End: 2023-01-09
Payer: MEDICARE

## 2023-01-09 VITALS
DIASTOLIC BLOOD PRESSURE: 86 MMHG | WEIGHT: 293 LBS | RESPIRATION RATE: 18 BRPM | OXYGEN SATURATION: 98 % | HEART RATE: 106 BPM | BODY MASS INDEX: 39.68 KG/M2 | HEIGHT: 72 IN | SYSTOLIC BLOOD PRESSURE: 138 MMHG | TEMPERATURE: 97.1 F

## 2023-01-09 DIAGNOSIS — M62.838 MUSCLE SPASM: ICD-10-CM

## 2023-01-09 DIAGNOSIS — F41.9 ANXIETY: ICD-10-CM

## 2023-01-09 DIAGNOSIS — E11.8 DM (DIABETES MELLITUS), TYPE 2 WITH COMPLICATIONS: ICD-10-CM

## 2023-01-09 DIAGNOSIS — I10 ESSENTIAL HYPERTENSION: ICD-10-CM

## 2023-01-09 DIAGNOSIS — J44.9 CHRONIC OBSTRUCTIVE PULMONARY DISEASE, UNSPECIFIED COPD TYPE: ICD-10-CM

## 2023-01-09 DIAGNOSIS — K21.9 GASTRO-ESOPHAGEAL REFLUX DISEASE WITHOUT ESOPHAGITIS: ICD-10-CM

## 2023-01-09 DIAGNOSIS — E55.9 VITAMIN D DEFICIENCY: ICD-10-CM

## 2023-01-09 DIAGNOSIS — E78.2 MIXED HYPERLIPIDEMIA: Primary | ICD-10-CM

## 2023-01-09 DIAGNOSIS — E03.9 HYPOTHYROIDISM, UNSPECIFIED TYPE: ICD-10-CM

## 2023-01-09 DIAGNOSIS — E61.1 IRON DEFICIENCY: ICD-10-CM

## 2023-01-09 DIAGNOSIS — G44.229 CHRONIC TENSION-TYPE HEADACHE, NOT INTRACTABLE: ICD-10-CM

## 2023-01-09 DIAGNOSIS — R06.09 DYSPNEA ON EXERTION: ICD-10-CM

## 2023-01-09 PROCEDURE — 99213 OFFICE O/P EST LOW 20 MIN: CPT | Performed by: NURSE PRACTITIONER

## 2023-01-09 NOTE — PROGRESS NOTES
Chief Complaint  Follow-up (Insurance )    Subjective          Janette Chadwick presents to [facility] to discuss concerns with insurance and insurance requirements for her provider. She is also needing 90 day supply on medications.   Hypertension  This is a chronic problem. The current episode started more than 1 year ago. The problem is controlled. Associated symptoms include anxiety, headaches, malaise/fatigue and shortness of breath. There are no associated agents to hypertension. Risk factors for coronary artery disease include diabetes mellitus, dyslipidemia and obesity. Current antihypertension treatment includes beta blockers, alpha 1 blockers and diuretics. The current treatment provides moderate improvement. Hypertensive end-organ damage includes heart failure. Identifiable causes of hypertension include sleep apnea and a thyroid problem.   Anxiety  Presents for follow-up visit. Onset was at an unknown time. The problem has been waxing and waning. Symptoms include depressed mood, irritability, nervous/anxious behavior and shortness of breath. Symptoms occur most days. The severity of symptoms is mild. Nothing aggravates the symptoms.     Her past medical history is significant for anxiety/panic attacks and depression. Past treatments include SSRIs and non-SSRI antidepressants. The treatment provided mild relief. Compliance with prior treatments has been good.   Hyperlipidemia  This is a chronic problem. The current episode started more than 1 year ago. Exacerbating diseases include diabetes, hypothyroidism and obesity. Factors aggravating her hyperlipidemia include beta blockers. Associated symptoms include shortness of breath. Current antihyperlipidemic treatment includes statins. Risk factors for coronary artery disease include diabetes mellitus, dyslipidemia, obesity and hypertension.   Diabetes  She presents for her follow-up diabetic visit. She  has type 2 diabetes mellitus. Her disease course has been worsening. Hypoglycemia symptoms include headaches and nervousness/anxiousness. Associated symptoms include fatigue. Symptoms are worsening. Risk factors for coronary artery disease include dyslipidemia, obesity and hypertension. Current diabetic treatment includes oral agent (dual therapy). She is compliant with treatment most of the time. An ACE inhibitor/angiotensin II receptor blocker is being taken. She sees a podiatrist.Eye exam is current.   COPD  She complains of shortness of breath. This is a chronic problem. The current episode started more than 1 year ago. The problem occurs intermittently. The problem has been waxing and waning. Associated symptoms include dyspnea on exertion, headaches and malaise/fatigue. Her symptoms are aggravated by change in weather and any activity. Her symptoms are alleviated by steroid inhaler. She reports moderate improvement on treatment. Her past medical history is significant for COPD.     Outpatient Medications Prior to Visit   Medication Sig Dispense Refill   • aspirin 81 MG EC tablet Take 1 tablet by mouth Daily. 30 tablet 11   • [employer] misc Use daily to dispose needles 1 each 3   • calcium carbonate (OS-DIVYA) 600 MG tablet Take 600 mg by mouth Daily.     • Cobalamin Combinations (B-12) 100-5000 MCG sublingual tablet B12   1000 daily     • cromolyn (OPTICROM) 4 % ophthalmic solution Administer 1 drop to both eyes Daily.     • docusate sodium 100 MG capsule Take 1 capsule by mouth Daily. 90 each 0   • Fetzima 40 MG capsule sustained-release 24 hr TAKE ONE CAPSULE BY MOUTH ONCE EVERY DAY     • fluticasone (FLONASE) 50 MCG/ACT nasal spray 2 sprays into the nostril(s) as directed by provider Daily.     • FREESTYLE LITE test strip USE ONE STRIP TO CHECK BLOOD GLUCOSE TWICE DAILY     • furosemide (LASIX) 40 MG tablet Take 40 mg by mouth Daily.     • glucose blood (IGlucose Test Strips) test strip Use as  instructed 50 each 11   • hydrOXYzine (ATARAX) 25 MG tablet Take 1 tablet by mouth Daily As Needed for Anxiety. 30 tablet 0   • magnesium gluconate (MAGONATE) 500 MG tablet magnesium 500 mg tablet   Take by oral route.     • nystatin (MYCOSTATIN) 705627 UNIT/GM cream APPLY ONE APPLICATION TOPICALLY TO  INCISION SITE TWICE DAILY FOR 30 DAYS     • potassium chloride 10 MEQ CR tablet TAKE 1 TABLET BY MOUTH EVERY DAY 30 tablet 0   • prochlorperazine (COMPAZINE) 10 MG tablet Take 10 mg by mouth.     • Symbicort 80-4.5 MCG/ACT inhaler Inhale 2 puffs 2 (Two) Times a Day.     • TRUEplus Lancets 28G misc Use up to 4 times daily to check blood sugar 100 each 11   • vitamin C (ASCORBIC ACID) 500 MG tablet TAKE 2 TABLETS BY MOUTH EVERY DAY 60 tablet 0   • atorvastatin (LIPITOR) 20 MG tablet Take 20 mg by mouth Daily.     • carvedilol (COREG) 3.125 MG tablet Take 3.125 mg by mouth 2 (Two) Times a Day With Meals.     • cetirizine (zyrTEC) 10 MG tablet Take 1 tablet by mouth Daily.     • cholecalciferol (VITAMIN D3) 1000 units tablet Take 2,000 Units by mouth Daily.     • cyclobenzaprine (FLEXERIL) 10 MG tablet Take 1 tablet by mouth 2 (Two) Times a Day As Needed for Muscle Spasms. 60 tablet 1   • DULoxetine (CYMBALTA) 60 MG capsule TAKE 1 CAPSULE BY MOUTH EVERY DAY 30 capsule 0   • Emgality 120 MG/ML solution prefilled syringe      • empagliflozin (Jardiance) 10 MG tablet tablet Take 1 tablet by mouth Daily. 30 tablet 11   • esomeprazole (nexIUM) 20 MG capsule      • ferrous sulfate 325 (65 FE) MG tablet Take 325 mg by mouth Daily With Breakfast.     • levothyroxine (SYNTHROID, LEVOTHROID) 175 MCG tablet Take 1 tablet by mouth Daily. 30 tablet 11   • Linzess 72 MCG capsule capsule Take 1 capsule by mouth Daily. 30 capsule 6   • lisinopril-hydrochlorothiazide (PRINZIDE,ZESTORETIC) 20-12.5 MG per tablet Take 1 tablet by mouth Daily.     • loratadine (CLARITIN) 10 MG tablet TAKE 1 TABLET BY MOUTH EVERY DAY 30 tablet 0   •  "metFORMIN (GLUCOPHAGE) 1000 MG tablet Take 1,000 mg by mouth 2 (Two) Times a Day With Meals.     • pioglitazone (ACTOS) 15 MG tablet pioglitazone 15 mg tablet   Take 1 tablet every day by oral route.     • tiZANidine (ZANAFLEX) 4 MG tablet TAKE 1 TABLET BY MOUTH EVERY NIGHT 30 tablet 0   • naproxen (NAPROSYN) 500 MG tablet Take 500 mg by mouth As Needed.     • albuterol sulfate  (90 Base) MCG/ACT inhaler Inhale 2 puffs Every 4 (Four) Hours As Needed for Wheezing.     • amitriptyline (ELAVIL) 100 MG tablet TAKE 1 TABLET BY MOUTH AT BEDTIME 30 tablet 0   • ascorbic acid (VITAMIN C) 100 MG tablet Vitamin C   1000 daily     • meloxicam (Mobic) 15 MG tablet Take 1 tablet by mouth Daily. 30 tablet 0   • Semaglutide, 1 MG/DOSE, (Ozempic, 1 MG/DOSE,) 2 MG/1.5ML solution pen-injector Inject  under the skin into the appropriate area as directed 1 (One) Time Per Week.     • Tirzepatide (Mounjaro) 5 MG/0.5ML solution pen-injector Inject 0.5 mL under the skin into the appropriate area as directed 1 (One) Time Per Week. 2 mL 0     No facility-administered medications prior to visit.       Review of Systems   Constitutional: Positive for fatigue, irritability and malaise/fatigue.   Respiratory: Positive for shortness of breath.    Cardiovascular: Positive for dyspnea on exertion.   Neurological: Positive for headaches.   Psychiatric/Behavioral: The patient is nervous/anxious.          Objective   Vital Signs:   Visit Vitals  /86   Pulse 106   Temp 97.1 °F (36.2 °C) (Infrared)   Resp 18   Ht 182.9 cm (72.01\")   Wt (!) 167 kg (367 lb 3.2 oz)   SpO2 98%   BMI 49.79 kg/m²     Physical Exam  Vitals and nursing note reviewed. Exam conducted with a chaperone present (in wheelchair ).   Constitutional:       Appearance: She is well-developed. She is obese.   HENT:      Head: Normocephalic and atraumatic.   Eyes:      General: Lids are normal.      Conjunctiva/sclera: Conjunctivae normal.   Neck:      Thyroid: No thyroid mass " or thyromegaly.      Trachea: Trachea normal. No tracheal tenderness.   Cardiovascular:      Rate and Rhythm: Normal rate.      Pulses: Normal pulses.      Heart sounds: Normal heart sounds.   Pulmonary:      Effort: Pulmonary effort is normal. No respiratory distress.      Breath sounds: Normal breath sounds. No wheezing.   Abdominal:      General: There is no distension.      Palpations: Abdomen is soft. There is no mass.   Musculoskeletal:         General: Normal range of motion.      Cervical back: Normal range of motion. No edema.   Skin:     General: Skin is warm and dry.      Coloration: Skin is not pale.      Findings: No abrasion, erythema or lesion.   Neurological:      Mental Status: She is alert and oriented to person, place, and time.   Psychiatric:         Mood and Affect: Mood is not anxious. Affect is not inappropriate.         Speech: Speech normal.         Behavior: Behavior normal.         Thought Content: Thought content normal.         Judgment: Judgment normal. Judgment is not impulsive.        Result Review :                 Assessment and Plan    Diagnoses and all orders for this visit:    1. Mixed hyperlipidemia (Primary)  -     atorvastatin (LIPITOR) 20 MG tablet; Take 1 tablet by mouth Daily.  Dispense: 90 tablet; Refill: 3    2. DM (diabetes mellitus), type 2 with complications (HCC)  -     Semaglutide, 1 MG/DOSE, (Ozempic, 1 MG/DOSE,) 2 MG/1.5ML solution pen-injector; Inject 1 mg under the skin into the appropriate area as directed 1 (One) Time Per Week.  Dispense: 9 mL; Refill: 3  -     meloxicam (Mobic) 15 MG tablet; Take 1 tablet by mouth Daily.  Dispense: 30 tablet; Refill: 3  -     empagliflozin (Jardiance) 10 MG tablet tablet; Take 1 tablet by mouth Daily.  Dispense: 90 tablet; Refill: 3  -     Linzess 72 MCG capsule capsule; Take 1 capsule by mouth Daily.  Dispense: 90 capsule; Refill: 3  -     metFORMIN (GLUCOPHAGE) 1000 MG tablet; Take 1 tablet by mouth 2 (Two) Times a Day With  Meals.  Dispense: 180 tablet; Refill: 3  -     pioglitazone (ACTOS) 15 MG tablet; Take 1 tablet by mouth Daily.  Dispense: 90 tablet; Refill: 3    3. Muscle spasm  -     cyclobenzaprine (FLEXERIL) 10 MG tablet; Take 1 tablet by mouth 2 (Two) Times a Day As Needed for Muscle Spasms.  Dispense: 180 tablet; Refill: 3    4. Hypothyroidism, unspecified type  -     levothyroxine (SYNTHROID, LEVOTHROID) 175 MCG tablet; Take 1 tablet by mouth Daily.  Dispense: 90 tablet; Refill: 3    5. Anxiety  -     amitriptyline (ELAVIL) 100 MG tablet; Take 1 tablet by mouth every night at bedtime.  Dispense: 90 tablet; Refill: 3  -     DULoxetine (CYMBALTA) 60 MG capsule; Take 1 capsule by mouth Daily.  Dispense: 90 capsule; Refill: 3    6. Dyspnea on exertion    7. Chronic obstructive pulmonary disease, unspecified COPD type (HCC)  -     albuterol sulfate  (90 Base) MCG/ACT inhaler; Inhale 2 puffs Every 4 (Four) Hours As Needed for Wheezing.  Dispense: 54 g; Refill: 3    8. Essential hypertension  -     carvedilol (COREG) 3.125 MG tablet; Take 1 tablet by mouth 2 (Two) Times a Day With Meals.  Dispense: 180 tablet; Refill: 3  -     lisinopril-hydrochlorothiazide (PRINZIDE,ZESTORETIC) 20-12.5 MG per tablet; Take 1 tablet by mouth Daily.  Dispense: 90 tablet; Refill: 3    9. Vitamin D deficiency  -     cholecalciferol (VITAMIN D3) 25 MCG (1000 UT) tablet; Take 2 tablets by mouth Daily.  Dispense: 180 tablet; Refill: 3    10. Chronic tension-type headache, not intractable  -     Emgality 120 MG/ML solution prefilled syringe; Inject 1 mL under the skin into the appropriate area as directed Every 30 (Thirty) Days.  Dispense: 3.36 mL; Refill: 3    11. Gastro-esophageal reflux disease without esophagitis  -     esomeprazole (nexIUM) 20 MG capsule; Take 1 capsule by mouth Every Morning Before Breakfast.  Dispense: 90 capsule; Refill: 3    12. Iron deficiency  -     ferrous sulfate 325 (65 FE) MG tablet; Take 1 tablet by mouth Daily With  Breakfast.  Dispense: 90 tablet; Refill: 3    Other orders  -     loratadine (CLARITIN) 10 MG tablet; Take 1 tablet by mouth Daily.  Dispense: 90 tablet; Refill: 3        Continue current medications     I will look at forms and get these completed    Refills of medication sent to pharmacy     Follow Up   Return if symptoms worsen or fail to improve, for Next scheduled follow up.  Patient was given instructions and counseling regarding her condition or for health maintenance advice. Please see specific information pulled into the AVS if appropriate.           This document has been electronically signed by INES Azevedo on January 11, 2023 08:13 CST  Answers for HPI/ROS submitted by the patient on 1/2/2023  Please describe your symptoms.: Still tailbone pain , Her to verify that I'm still chronic health issues  Have you had these symptoms before?: Yes  How long have you been having these symptoms?: Greater than 2 weeks  Please list any medications you are currently taking for this condition.: Need refills on most all meds  What is the primary reason for your visit?: Other

## 2023-01-11 RX ORDER — LINACLOTIDE 72 UG/1
72 CAPSULE, GELATIN COATED ORAL DAILY
Qty: 90 CAPSULE | Refills: 3 | Status: SHIPPED | OUTPATIENT
Start: 2023-01-11

## 2023-01-11 RX ORDER — SEMAGLUTIDE 1.34 MG/ML
1 INJECTION, SOLUTION SUBCUTANEOUS WEEKLY
Qty: 9 ML | Refills: 3 | Status: SHIPPED | OUTPATIENT
Start: 2023-01-11 | End: 2023-03-02

## 2023-01-11 RX ORDER — CARVEDILOL 3.12 MG/1
3.12 TABLET ORAL 2 TIMES DAILY WITH MEALS
Qty: 180 TABLET | Refills: 3 | Status: SHIPPED | OUTPATIENT
Start: 2023-01-11

## 2023-01-11 RX ORDER — ALBUTEROL SULFATE 90 UG/1
2 AEROSOL, METERED RESPIRATORY (INHALATION) EVERY 4 HOURS PRN
Qty: 54 G | Refills: 3 | Status: SHIPPED | OUTPATIENT
Start: 2023-01-11

## 2023-01-11 RX ORDER — GALCANEZUMAB 120 MG/ML
120 INJECTION, SOLUTION SUBCUTANEOUS
Qty: 3.36 ML | Refills: 3 | Status: SHIPPED | OUTPATIENT
Start: 2023-01-11

## 2023-01-11 RX ORDER — LORATADINE 10 MG/1
10 TABLET ORAL DAILY
Qty: 90 TABLET | Refills: 3 | Status: SHIPPED | OUTPATIENT
Start: 2023-01-11

## 2023-01-11 RX ORDER — PIOGLITAZONEHYDROCHLORIDE 15 MG/1
15 TABLET ORAL DAILY
Qty: 90 TABLET | Refills: 3 | Status: SHIPPED | OUTPATIENT
Start: 2023-01-11

## 2023-01-11 RX ORDER — ATORVASTATIN CALCIUM 20 MG/1
20 TABLET, FILM COATED ORAL DAILY
Qty: 90 TABLET | Refills: 3 | Status: SHIPPED | OUTPATIENT
Start: 2023-01-11

## 2023-01-11 RX ORDER — LEVOTHYROXINE SODIUM 175 UG/1
175 TABLET ORAL DAILY
Qty: 90 TABLET | Refills: 3 | Status: SHIPPED | OUTPATIENT
Start: 2023-01-11

## 2023-01-11 RX ORDER — MELATONIN
2000 DAILY
Qty: 180 TABLET | Refills: 3 | Status: SHIPPED | OUTPATIENT
Start: 2023-01-11

## 2023-01-11 RX ORDER — FERROUS SULFATE 325(65) MG
325 TABLET ORAL
Qty: 90 TABLET | Refills: 3 | Status: SHIPPED | OUTPATIENT
Start: 2023-01-11

## 2023-01-11 RX ORDER — DULOXETIN HYDROCHLORIDE 60 MG/1
60 CAPSULE, DELAYED RELEASE ORAL DAILY
Qty: 90 CAPSULE | Refills: 3 | Status: SHIPPED | OUTPATIENT
Start: 2023-01-11

## 2023-01-11 RX ORDER — AMITRIPTYLINE HYDROCHLORIDE 100 MG/1
100 TABLET, FILM COATED ORAL
Qty: 90 TABLET | Refills: 3 | Status: SHIPPED | OUTPATIENT
Start: 2023-01-11

## 2023-01-11 RX ORDER — CYCLOBENZAPRINE HCL 10 MG
10 TABLET ORAL 2 TIMES DAILY PRN
Qty: 180 TABLET | Refills: 3 | Status: SHIPPED | OUTPATIENT
Start: 2023-01-11

## 2023-01-11 RX ORDER — LISINOPRIL AND HYDROCHLOROTHIAZIDE 20; 12.5 MG/1; MG/1
1 TABLET ORAL DAILY
Qty: 90 TABLET | Refills: 3 | Status: SHIPPED | OUTPATIENT
Start: 2023-01-11

## 2023-01-11 RX ORDER — MELOXICAM 15 MG/1
15 TABLET ORAL DAILY
Qty: 30 TABLET | Refills: 3 | Status: SHIPPED | OUTPATIENT
Start: 2023-01-11

## 2023-01-16 ENCOUNTER — OFFICE VISIT (OUTPATIENT)
Dept: PULMONOLOGY | Facility: CLINIC | Age: 58
End: 2023-01-16
Payer: MEDICARE

## 2023-01-16 VITALS
HEIGHT: 72 IN | DIASTOLIC BLOOD PRESSURE: 84 MMHG | WEIGHT: 293 LBS | HEART RATE: 104 BPM | BODY MASS INDEX: 39.68 KG/M2 | SYSTOLIC BLOOD PRESSURE: 132 MMHG | OXYGEN SATURATION: 97 %

## 2023-01-16 DIAGNOSIS — J45.20 MILD INTERMITTENT EXTRINSIC ASTHMA WITHOUT COMPLICATION: Primary | ICD-10-CM

## 2023-01-16 DIAGNOSIS — G47.33 OBSTRUCTIVE SLEEP APNEA SYNDROME: ICD-10-CM

## 2023-01-16 PROCEDURE — 99213 OFFICE O/P EST LOW 20 MIN: CPT | Performed by: INTERNAL MEDICINE

## 2023-01-16 NOTE — PROGRESS NOTES
"    Pulmonary Consultation    No ref. provider found,    Thank you for asking me to see Janette Chadwick for   Chief Complaint   Patient presents with   • Asthma   .      History of Present Illness  Janette Chadwick is a 57 y.o. female     1/16/22  Patient here for follow up on ashtma. At last visit, we stopped her Symbicort and she has been doing fine off that. Not needing rescue inhaler. No exacerbations since last visit. Obtained recrods from prior pulm, PFTs were not consistent with asthma.        12/5/22  Patient referred from PCM for \"COPD\"    Patient tells me that she has a history of asthma and has recently moved up here and is trying to get established with new specialists in this area    Patient is on Symbicort and albuterol. 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 Also cigarette smoke. She has not had an exacerbation in years as she is no longer around these    She broke her coccyx early Nov and has had a hard time getting around since then. Broke her shoulder back in March. Wasn't very active prior to that      Tobacco use history:  Never smoked      Review of Systems: History obtained from chart review and the patient.  Review of Systems  As described in the HPI. Otherwise, remainder of ROS (14 systems) were negative.    Patient Active Problem List   Diagnosis   • Chest pain   • Diabetes ([facility])   • Mixed hyperlipidemia   • Essential hypertension   • Refractory migraine without aura   • Chronic migraine   • Body mass index (BMI) 45.0-49.9, adult ([facility])   • Pain in right upper arm   • Acquired hypothyroidism   • Abnormal cardiovascular stress test   • CHF (congestive heart failure) ([facility])   • Constipation   • Diabetic neuropathy ([facility])   • Diastolic dysfunction   • Dyspnea on exertion   • Gastro-esophageal " reflux disease without esophagitis   • Headache   • Hearing loss   • Hyperglycemia due to type 2 diabetes mellitus ([facility])   • Insomnia   • Depression with anxiety   • Never smoked tobacco   • Sleep apnea   • Osteoarthritis of knee   • History of sleep apnea   • Severe obesity ([facility])   • Polyarthritis   • Type 2 diabetes mellitus without complication ([facility])   • Vitamin D deficiency   • Peripheral vascular disease ([facility])   • Localized swelling of both lower legs   • Acquired hammer toe of left foot   • Allergic asthma   • Carpal tunnel syndrome   • Hypomagnesemia   • Moderate recurrent major depression (HCC)   • Mononeuropathy due to type 2 diabetes mellitus ([facility])   • Skin sensation disturbance         Current Outpatient Medications:   •  albuterol sulfate  (90 Base) MCG/ACT inhaler, Inhale 2 puffs Every 4 (Four) Hours As Needed for Wheezing., Disp: 54 g, Rfl: 3  •  amitriptyline (ELAVIL) 100 MG tablet, Take 1 tablet by mouth every night at bedtime., Disp: 90 tablet, Rfl: 3  •  aspirin 81 MG EC tablet, Take 1 tablet by mouth Daily., Disp: 30 tablet, Rfl: 11  •  atorvastatin (LIPITOR) 20 MG tablet, Take 1 tablet by mouth Daily., Disp: 90 tablet, Rfl: 3  •  BD [employer] misc, Use daily to dispose needles, Disp: 1 each, Rfl: 3  •  calcium carbonate (OS-DIVYA) 600 MG tablet, Take 600 mg by mouth Daily., Disp: , Rfl:   •  carvedilol (COREG) 3.125 MG tablet, Take 1 tablet by mouth 2 (Two) Times a Day With Meals., Disp: 180 tablet, Rfl: 3  •  cholecalciferol (VITAMIN D3) 25 MCG (1000 UT) tablet, Take 2 tablets by mouth Daily., Disp: 180 tablet, Rfl: 3  •  Cobalamin Combinations (B-12) 100-5000 MCG sublingual tablet, B12  1000 daily, Disp: , Rfl:   •  cromolyn (OPTICROM) 4 % ophthalmic solution, Administer 1 drop to both eyes Daily., Disp: , Rfl:   •  cyclobenzaprine (FLEXERIL) 10 MG tablet, Take 1 tablet by mouth 2 (Two) Times a Day As Needed for Muscle Spasms., Disp: 180 tablet, Rfl: 3  •  docusate sodium 100  MG capsule, Take 1 capsule by mouth Daily., Disp: 90 each, Rfl: 0  •  DULoxetine (CYMBALTA) 60 MG capsule, Take 1 capsule by mouth Daily., Disp: 90 capsule, Rfl: 3  •  Emgality 120 MG/ML solution prefilled syringe, Inject 1 mL under the skin into the appropriate area as directed Every 30 (Thirty) Days., Disp: 3.36 mL, Rfl: 3  •  empagliflozin (Jardiance) 10 MG tablet tablet, Take 1 tablet by mouth Daily., Disp: 90 tablet, Rfl: 3  •  esomeprazole (nexIUM) 20 MG capsule, Take 1 capsule by mouth Every Morning Before Breakfast., Disp: 90 capsule, Rfl: 3  •  ferrous sulfate 325 (65 FE) MG tablet, Take 1 tablet by mouth Daily With Breakfast., Disp: 90 tablet, Rfl: 3  •  Fetzima 40 MG capsule sustained-release 24 hr, TAKE ONE CAPSULE BY MOUTH ONCE EVERY DAY, Disp: , Rfl:   •  fluticasone (FLONASE) 50 MCG/ACT nasal spray, 2 sprays into the nostril(s) as directed by provider Daily., Disp: , Rfl:   •  FREESTYLE LITE test strip, USE ONE STRIP TO CHECK BLOOD GLUCOSE TWICE DAILY, Disp: , Rfl:   •  furosemide (LASIX) 40 MG tablet, Take 40 mg by mouth Daily., Disp: , Rfl:   •  glucose blood (IGlucose Test Strips) test strip, Use as instructed, Disp: 50 each, Rfl: 11  •  hydrOXYzine (ATARAX) 25 MG tablet, Take 1 tablet by mouth Daily As Needed for Anxiety., Disp: 30 tablet, Rfl: 0  •  levothyroxine (SYNTHROID, LEVOTHROID) 175 MCG tablet, Take 1 tablet by mouth Daily., Disp: 90 tablet, Rfl: 3  •  Linzess 72 MCG capsule capsule, Take 1 capsule by mouth Daily., Disp: 90 capsule, Rfl: 3  •  lisinopril-hydrochlorothiazide (PRINZIDE,ZESTORETIC) 20-12.5 MG per tablet, Take 1 tablet by mouth Daily., Disp: 90 tablet, Rfl: 3  •  loratadine (CLARITIN) 10 MG tablet, Take 1 tablet by mouth Daily., Disp: 90 tablet, Rfl: 3  •  magnesium gluconate (MAGONATE) 500 MG tablet, magnesium 500 mg tablet  Take by oral route., Disp: , Rfl:   •  meloxicam (Mobic) 15 MG tablet, Take 1 tablet by mouth Daily., Disp: 30 tablet, Rfl: 3  •  metFORMIN (GLUCOPHAGE)  1000 MG tablet, Take 1 tablet by mouth 2 (Two) Times a Day With Meals., Disp: 180 tablet, Rfl: 3  •  naproxen (NAPROSYN) 500 MG tablet, Take 500 mg by mouth As Needed., Disp: , Rfl:   •  nystatin (MYCOSTATIN) 755970 UNIT/GM cream, APPLY ONE APPLICATION TOPICALLY TO  INCISION SITE TWICE DAILY FOR 30 DAYS, Disp: , Rfl:   •  pioglitazone (ACTOS) 15 MG tablet, Take 1 tablet by mouth Daily., Disp: 90 tablet, Rfl: 3  •  potassium chloride 10 MEQ CR tablet, TAKE 1 TABLET BY MOUTH EVERY DAY, Disp: 30 tablet, Rfl: 0  •  prochlorperazine (COMPAZINE) 10 MG tablet, Take 10 mg by mouth., Disp: , Rfl:   •  Semaglutide, 1 MG/DOSE, (Ozempic, 1 MG/DOSE,) 2 MG/1.5ML solution pen-injector, Inject 1 mg under the skin into the appropriate area as directed 1 (One) Time Per Week., Disp: 9 mL, Rfl: 3  •  Symbicort 80-4.5 MCG/ACT inhaler, Inhale 2 puffs 2 (Two) Times a Day., Disp: , Rfl:   •  TRUEplus Lancets 28G misc, Use up to 4 times daily to check blood sugar, Disp: 100 each, Rfl: 11  •  vitamin C (ASCORBIC ACID) 500 MG tablet, TAKE 2 TABLETS BY MOUTH EVERY DAY, Disp: 60 tablet, Rfl: 0    Allergies   Allergen Reactions   • Adhesive Tape Rash   • Other Rash   • Penicillins Rash, Hives and Itching       Past Medical History:   Diagnosis Date   • Arthritis    • Asthma    • Carpal tunnel syndrome    • CHF (congestive heart failure) ([facility])    • COPD (chronic obstructive pulmonary disease) ([facility])    • Diabetes mellitus ([facility])    • Disease of thyroid gland    • Elevated cholesterol    • GERD (gastroesophageal reflux disease)    • Hyperlipidemia    • Hypertension    • Sleep apnea      Past Surgical History:   Procedure Laterality Date   • CHOLECYSTECTOMY     • CHOLECYSTECTOMY     • FRACTURE SURGERY     • HYSTERECTOMY     • JOINT REPLACEMENT     • KIDNEY STONE SURGERY     • TIBIA FRACTURE SURGERY     • TUBAL ABDOMINAL LIGATION       Social History     Socioeconomic History   • Marital status:    Tobacco Use   • Smoking status:  "Never   • Smokeless tobacco: Never   Vaping Use   • Vaping Use: Never used   Substance and Sexual Activity   • Alcohol use: No   • Drug use: No   • Sexual activity: Defer     History reviewed. No pertinent family history.       Objective     Blood pressure 132/84, pulse 104, height 182.9 cm (72\"), weight (!) 166 kg (366 lb), SpO2 97 %.  Physical Exam  Vitals reviewed.   Constitutional:       Appearance: Normal appearance. She is obese.   HENT:      Head: Normocephalic and atraumatic.      Nose: Nose normal.      Mouth/Throat:      Mouth: Mucous membranes are moist.      Pharynx: Oropharynx is clear.   Eyes:      Conjunctiva/sclera: Conjunctivae normal.      Pupils: Pupils are equal, round, and reactive to light.   Cardiovascular:      Rate and Rhythm: Normal rate and regular rhythm.      Pulses: Normal pulses.      Heart sounds: Normal heart sounds.   Pulmonary:      Effort: Pulmonary effort is normal.      Breath sounds: Normal breath sounds.   Abdominal:      General: Abdomen is flat. Bowel sounds are normal.      Palpations: Abdomen is soft.   Musculoskeletal:         General: Normal range of motion.      Cervical back: Normal range of motion.   Skin:     General: Skin is warm and dry.   Neurological:      General: No focal deficit present.      Mental Status: She is alert and oriented to person, place, and time.   Psychiatric:         Mood and Affect: Mood normal.         Behavior: Behavior normal.         PFTs:  (independently reviewed and interpreted by me)  None    Radiology (independently reviewed and interpreted by me):   None     Assessment & Plan     Diagnoses and all orders for this visit:    1. Mild intermittent extrinsic asthma without complication (Primary)    2. Obstructive sleep apnea syndrome         Discussion/ Recommendations:   Patient with no symptoms at this time concerning for asthma. Last PFTs done in [place] showed restrictive pattern, likely from body habitus. Patient is doing fine " off Symbicort. Not needing rescue inhaler currently. Has it in case of emergencues. Will see her back one more time in 6 months, and if still doing ok can follow up PRN after that      -Continue prn albuterol               Return in about 6 months (around 7/16/2023) for f/u asthma.      Thank you for allowing me to participate in the care of Janette Chadwick. Please do not hesitate to contact me with any questions.         This document has been electronically signed by Amarilis Porter DO on January 16, 2023 10:37 CST

## 2023-01-18 ENCOUNTER — TELEPHONE (OUTPATIENT)
Dept: ENDOCRINOLOGY | Facility: CLINIC | Age: 58
End: 2023-01-18
Payer: MEDICARE

## 2023-01-20 DIAGNOSIS — F41.9 ANXIETY: ICD-10-CM

## 2023-01-20 RX ORDER — POTASSIUM CHLORIDE 750 MG/1
TABLET, FILM COATED, EXTENDED RELEASE ORAL
Qty: 30 TABLET | Refills: 0 | Status: SHIPPED | OUTPATIENT
Start: 2023-01-20 | End: 2023-02-21

## 2023-01-20 RX ORDER — HYDROXYZINE HYDROCHLORIDE 25 MG/1
TABLET, FILM COATED ORAL
Qty: 30 TABLET | Refills: 0 | Status: SHIPPED | OUTPATIENT
Start: 2023-01-20

## 2023-01-25 ENCOUNTER — OFFICE VISIT (OUTPATIENT)
Dept: ENDOCRINOLOGY | Facility: CLINIC | Age: 58
End: 2023-01-25
Payer: MEDICARE

## 2023-01-25 VITALS
SYSTOLIC BLOOD PRESSURE: 150 MMHG | BODY MASS INDEX: 39.68 KG/M2 | OXYGEN SATURATION: 97 % | DIASTOLIC BLOOD PRESSURE: 90 MMHG | HEIGHT: 72 IN | WEIGHT: 293 LBS | HEART RATE: 106 BPM

## 2023-01-25 DIAGNOSIS — E78.2 MIXED HYPERLIPIDEMIA: ICD-10-CM

## 2023-01-25 DIAGNOSIS — E03.9 ACQUIRED HYPOTHYROIDISM: ICD-10-CM

## 2023-01-25 DIAGNOSIS — I10 ESSENTIAL HYPERTENSION: ICD-10-CM

## 2023-01-25 DIAGNOSIS — E66.01 CLASS 3 SEVERE OBESITY WITHOUT SERIOUS COMORBIDITY WITH BODY MASS INDEX (BMI) OF 45.0 TO 49.9 IN ADULT, UNSPECIFIED OBESITY TYPE: ICD-10-CM

## 2023-01-25 DIAGNOSIS — E11.65 TYPE 2 DIABETES MELLITUS WITH HYPERGLYCEMIA, WITHOUT LONG-TERM CURRENT USE OF INSULIN: Primary | ICD-10-CM

## 2023-01-25 PROCEDURE — 99214 OFFICE O/P EST MOD 30 MIN: CPT | Performed by: NURSE PRACTITIONER

## 2023-01-25 RX ORDER — ASCORBIC ACID 500 MG
TABLET ORAL
Qty: 60 TABLET | Refills: 3 | Status: SHIPPED | OUTPATIENT
Start: 2023-01-25

## 2023-01-25 NOTE — PROGRESS NOTES
"Chief Complaint  Diabetes    Subjective          Janette Chadwick presents to [facility] ENDOCRINOLOGY  History of Present Illness       In office visit     Referring provider INES Azevedo       Chief Complaint   Patient presents with   • Diabetes       HPI    57 year old female presents for consultation         Reason -- diabetes mellitus      Duration-- diagnosed in 2002     Context --routine lab work     Timing -- constant     Quality  Not controlled     Severity -- moderate      Macrovascular complications--- none     Microvascular complications -+ neuropathy, no DR     Current diabetes regimen     GLP-1 and  Jardiance     Current glucose monitoring     fingerstick     Once daily     States 102 up to 127       Hypothyroidism     Duration  -- diagnosed 30 years     Timing constant     quality - not controlled         Review of Systems - General ROS: negative              Objective   Vital Signs:   /90   Pulse 106   Ht 182.9 cm (72\")   Wt (!) 166 kg (365 lb 11.2 oz)   SpO2 97%   BMI 49.60 kg/m²     Physical Exam  Constitutional:       Appearance: Normal appearance.   Cardiovascular:      Rate and Rhythm: Regular rhythm.      Heart sounds: Normal heart sounds.   Musculoskeletal:         General: Normal range of motion.      Cervical back: Normal range of motion.   Neurological:      Mental Status: She is alert.        Result Review :   The following data was reviewed by: INES Marino on 01/25/2023:  Common labs    Common Labs 11/30/22 11/30/22 11/30/22 11/30/22 11/30/22    1134 1135 1135 1135 1135   Glucose  108 (A)      BUN  19      Creatinine  1.18 (A)      Sodium  136      Potassium  4.2      Chloride  94 (A)      Calcium  10.3      Albumin  4.60      Total Bilirubin  0.9      Alkaline Phosphatase  164 (A)      AST (SGOT)  33 (A)      ALT (SGPT)  31      WBC    13.57 (A)    Hemoglobin    14.3    Hematocrit    43.8    Platelets    474 (A)    Total " Cholesterol   198     Triglycerides   167 (A)     HDL Cholesterol   36 (A)     LDL Cholesterol    132 (A)     Hemoglobin A1C     7.30 (A)   Microalbumin, Urine 3.3       (A) Abnormal value                      Assessment and Plan    Diagnoses and all orders for this visit:    1. Type 2 diabetes mellitus with hyperglycemia, without long-term current use of insulin (HCC) (Primary)  -     CBC & Differential; Future  -     Comprehensive Metabolic Panel; Future  -     Hemoglobin A1c; Future  -     Microalbumin / Creatinine Urine Ratio - Urine, Clean Catch; Future  -     Lipid Panel; Future  -     TSH; Future  -     Vitamin B12; Future    2. Mixed hyperlipidemia  -     CBC & Differential; Future  -     Comprehensive Metabolic Panel; Future  -     Hemoglobin A1c; Future  -     Microalbumin / Creatinine Urine Ratio - Urine, Clean Catch; Future  -     Lipid Panel; Future  -     TSH; Future  -     Vitamin B12; Future    3. Essential hypertension  -     CBC & Differential; Future  -     Comprehensive Metabolic Panel; Future  -     Hemoglobin A1c; Future  -     Microalbumin / Creatinine Urine Ratio - Urine, Clean Catch; Future  -     Lipid Panel; Future  -     TSH; Future  -     Vitamin B12; Future               Glycemic Management:    Diabetes mellitus type 2    Lab Results   Component Value Date    HGBA1C 7.30 (H) 11/30/2022       Taking ozempic 1 mg weekly     Taking jardiance 10 mg daily -      Taking actos 15 mg daily     No Changes today    Her sugars are back in target    We did discuss going up on the Ozempic but she wants to hold off on that          Goals for sugar    Fasting and before meals 80 to 130    2 hours after meals 180 or less      Aim for 45 grams of carbohydrate per meal    Aim for 15 grams of carbohydrate per snack             Microvascular Complications Monitoring       Last eye exam--- scheduled for March 2022     Neuropathy --yes      No renal disease         Lipid Management:       Taking lipitor 20  mg one at night         Total Cholesterol   Date Value Ref Range Status   11/30/2022 198 0 - 200 mg/dL Final     Triglycerides   Date Value Ref Range Status   11/30/2022 167 (H) 0 - 150 mg/dL Final   08/17/2020 181 (H) 0 - 149 mg/dL Final     HDL Cholesterol   Date Value Ref Range Status   11/30/2022 36 (L) 40 - 60 mg/dL Final   08/17/2020 38 (L) 65 - 121 mg/dL Final     Comment:     VALUES>60 MG/DL ARE ASSOCIATED WITH A DECREASED RISK OF  ATHEROSCLEROTIC CARDIOVASCULAR DISEASE     LDL Cholesterol    Date Value Ref Range Status   11/30/2022 132 (H) 0 - 100 mg/dL Final   08/17/2020 80 <100 mg/dL Final     Comment:     <100 MG/DL=OPITIMAL    100-129 MG/DL=DESIRABLE    130-159 MG/DL BORDERLINE=INCREASED RISK OF ATHEROSCLEROTIC  CARDIOVASCULAR DISEASE    > OR = 160 MG/DL=ASSOCIATED WITH AN INCREASE RISK OF  ATHEROSCLEROTIC CARDIOVASCULAR DISEASE         Blood Pressure Management:      Taking lisinopril-hctz 20-12.5 mg daily       Thyroid Health      Lab Results   Component Value Date    TSH 8.330 (H) 11/30/2022       Taking levothyroxine 175 mcg daily  Was increased by primary --        Bone Health     Component      Latest Ref Rng & Units 11/30/2022   25 Hydroxy, Vitamin D      30.0 - 100.0 ng/ml 31.4       Weight Management:    Obesity     Body mass index is 49.6 kg/m².    Dietary changes discussed    Preventive Care:     Non-smoker        Records received and reviewed from Mrs. Morgan  from 2022    Thank you for this consultation          Follow Up   Return in about 3 months (around 4/25/2023) for Recheck.  Patient was given instructions and counseling regarding her condition or for health maintenance advice. Please see specific information pulled into the AVS if appropriate.         This document has been electronically signed by INES Marino on January 25, 2023 15:30 CST.

## 2023-01-26 DIAGNOSIS — I73.9 PVD (PERIPHERAL VASCULAR DISEASE) WITH CLAUDICATION: Primary | ICD-10-CM

## 2023-01-26 DIAGNOSIS — R22.43 LOCALIZED SWELLING OF BOTH LOWER LEGS: ICD-10-CM

## 2023-02-10 ENCOUNTER — OFFICE VISIT (OUTPATIENT)
Dept: CARDIAC SURGERY | Facility: CLINIC | Age: 58
End: 2023-02-10
Payer: MEDICARE

## 2023-02-10 VITALS
BODY MASS INDEX: 39.68 KG/M2 | WEIGHT: 293 LBS | HEART RATE: 90 BPM | DIASTOLIC BLOOD PRESSURE: 84 MMHG | HEIGHT: 72 IN | SYSTOLIC BLOOD PRESSURE: 149 MMHG | OXYGEN SATURATION: 98 %

## 2023-02-10 DIAGNOSIS — I87.8 VENOUS ENGORGEMENT: Primary | ICD-10-CM

## 2023-02-10 DIAGNOSIS — E66.01 CLASS 3 SEVERE OBESITY DUE TO EXCESS CALORIES WITHOUT SERIOUS COMORBIDITY WITH BODY MASS INDEX (BMI) OF 45.0 TO 49.9 IN ADULT: ICD-10-CM

## 2023-02-10 DIAGNOSIS — G60.9 IDIOPATHIC PERIPHERAL NEUROPATHY: ICD-10-CM

## 2023-02-10 PROBLEM — I73.9 PERIPHERAL VASCULAR DISEASE: Status: RESOLVED | Noted: 2022-10-18 | Resolved: 2023-02-10

## 2023-02-10 PROBLEM — G62.9 PERIPHERAL NEUROPATHY: Status: ACTIVE | Noted: 2023-02-10

## 2023-02-10 PROCEDURE — 99214 OFFICE O/P EST MOD 30 MIN: CPT | Performed by: NURSE PRACTITIONER

## 2023-02-10 NOTE — PATIENT INSTRUCTIONS
Venous Engorgement Bilateral lower extremities  -Weight loss Efforts  -Normal arterial perfusion  -Normal venous duplex    Follow up PCP

## 2023-02-10 NOTE — PROGRESS NOTES
CVTS Office Progress Note       Subjective   Patient ID: Janette Chadwick is a 57 y.o. female is being seen for follow up.    Chief Complaint:    Chief Complaint   Patient presents with   • Leg Pain       The following portions of the patient's history were reviewed and updated as appropriate: allergies, current medications, past family history, past medical history, past social history, past surgical history and problem list.  Recent images independently reviewed.  Available laboratory values reviewed.    PCP:  Nata Morgan APRN  Cardiology:  Jamey    57 y.o. female with HTN(stable, increased risk stroke, rupture), Hyperlipidemia(stable, increased risk cardiovascular events), Diabetes Mellitus(stable, increased risk cardiovascular events), Obesity(uncontrolled, increased risk cardiovascular events) and COPD(stable, increased risk pulmonary complications).  never smoked.  Referred for vascular evaluation numbness/tingling (chronic), reports dusky toes when feet hanging down. BLE swelling/pain..  No TIA stroke amaurosis.  No MI. Reports claudication 100ft (jello legs). No other associated signs, symptoms or modifying factors.    10/2022: MARCO ANTONIO: RIGHT 0.93 Triphasic LEFT 0.84 Triphasic   2/2023: MARCO ANTONIO: RIGHT 1.1 Triphasic LEFT 1.1 Triphasic    2/2023: LE Venous duplex: Normal. Negative DVT/Reflux     Past Medical History:   Diagnosis Date   • Arthritis    • Asthma    • Carpal tunnel syndrome 2020   • CHF (congestive heart failure) (HCC)    • COPD (chronic obstructive pulmonary disease) (HCC)    • Diabetes mellitus (HCC)    • Disease of thyroid gland    • Elevated cholesterol    • GERD (gastroesophageal reflux disease)    • Hyperlipidemia    • Hypertension    • Sleep apnea      Past Surgical History:   Procedure Laterality Date   • CHOLECYSTECTOMY     • CHOLECYSTECTOMY     • FRACTURE SURGERY     • HYSTERECTOMY     • JOINT REPLACEMENT     • KIDNEY STONE SURGERY     • TIBIA FRACTURE SURGERY     • TUBAL ABDOMINAL LIGATION        History reviewed. No pertinent family history.  Social History     Tobacco Use   • Smoking status: Never     Passive exposure: Never   • Smokeless tobacco: Never   Vaping Use   • Vaping Use: Never used   Substance Use Topics   • Alcohol use: No   • Drug use: No       ALLERGIES:   Adhesive tape, Other, and Penicillins    MEDICATIONS:      Current Outpatient Medications:   •  albuterol sulfate  (90 Base) MCG/ACT inhaler, Inhale 2 puffs Every 4 (Four) Hours As Needed for Wheezing., Disp: 54 g, Rfl: 3  •  amitriptyline (ELAVIL) 100 MG tablet, Take 1 tablet by mouth every night at bedtime., Disp: 90 tablet, Rfl: 3  •  aspirin 81 MG EC tablet, Take 1 tablet by mouth Daily., Disp: 30 tablet, Rfl: 11  •  atorvastatin (LIPITOR) 20 MG tablet, Take 1 tablet by mouth Daily., Disp: 90 tablet, Rfl: 3  •  BD [employer] misc, Use daily to dispose needles, Disp: 1 each, Rfl: 3  •  calcium carbonate (OS-DIVYA) 600 MG tablet, Take 600 mg by mouth Daily., Disp: , Rfl:   •  carvedilol (COREG) 3.125 MG tablet, Take 1 tablet by mouth 2 (Two) Times a Day With Meals., Disp: 180 tablet, Rfl: 3  •  cholecalciferol (VITAMIN D3) 25 MCG (1000 UT) tablet, Take 2 tablets by mouth Daily., Disp: 180 tablet, Rfl: 3  •  Cobalamin Combinations (B-12) 100-5000 MCG sublingual tablet, B12  1000 daily, Disp: , Rfl:   •  cromolyn (OPTICROM) 4 % ophthalmic solution, Administer 1 drop to both eyes Daily., Disp: , Rfl:   •  cyclobenzaprine (FLEXERIL) 10 MG tablet, Take 1 tablet by mouth 2 (Two) Times a Day As Needed for Muscle Spasms., Disp: 180 tablet, Rfl: 3  •  docusate sodium 100 MG capsule, Take 1 capsule by mouth Daily., Disp: 90 each, Rfl: 0  •  DULoxetine (CYMBALTA) 60 MG capsule, Take 1 capsule by mouth Daily., Disp: 90 capsule, Rfl: 3  •  Emgality 120 MG/ML solution prefilled syringe, Inject 1 mL under the skin into the appropriate area as directed Every 30 (Thirty) Days., Disp: 3.36 mL, Rfl: 3  •  empagliflozin (Jardiance) 10 MG  tablet tablet, Take 1 tablet by mouth Daily., Disp: 90 tablet, Rfl: 3  •  esomeprazole (nexIUM) 20 MG capsule, Take 1 capsule by mouth Every Morning Before Breakfast., Disp: 90 capsule, Rfl: 3  •  ferrous sulfate 325 (65 FE) MG tablet, Take 1 tablet by mouth Daily With Breakfast., Disp: 90 tablet, Rfl: 3  •  Fetzima 40 MG capsule sustained-release 24 hr, TAKE ONE CAPSULE BY MOUTH ONCE EVERY DAY, Disp: , Rfl:   •  fluticasone (FLONASE) 50 MCG/ACT nasal spray, 2 sprays into the nostril(s) as directed by provider Daily., Disp: , Rfl:   •  FREESTYLE LITE test strip, USE ONE STRIP TO CHECK BLOOD GLUCOSE TWICE DAILY, Disp: , Rfl:   •  furosemide (LASIX) 40 MG tablet, Take 40 mg by mouth Daily., Disp: , Rfl:   •  glucose blood (IGlucose Test Strips) test strip, Use as instructed, Disp: 50 each, Rfl: 11  •  hydrOXYzine (ATARAX) 25 MG tablet, TAKE 1 TABLET EVERY DAY AS NEEDED FOR ANXIETY, Disp: 30 tablet, Rfl: 0  •  levothyroxine (SYNTHROID, LEVOTHROID) 175 MCG tablet, Take 1 tablet by mouth Daily., Disp: 90 tablet, Rfl: 3  •  Linzess 72 MCG capsule capsule, Take 1 capsule by mouth Daily., Disp: 90 capsule, Rfl: 3  •  lisinopril-hydrochlorothiazide (PRINZIDE,ZESTORETIC) 20-12.5 MG per tablet, Take 1 tablet by mouth Daily., Disp: 90 tablet, Rfl: 3  •  loratadine (CLARITIN) 10 MG tablet, Take 1 tablet by mouth Daily., Disp: 90 tablet, Rfl: 3  •  magnesium gluconate (MAGONATE) 500 MG tablet, magnesium 500 mg tablet  Take by oral route., Disp: , Rfl:   •  meloxicam (Mobic) 15 MG tablet, Take 1 tablet by mouth Daily., Disp: 30 tablet, Rfl: 3  •  metFORMIN (GLUCOPHAGE) 1000 MG tablet, Take 1 tablet by mouth 2 (Two) Times a Day With Meals., Disp: 180 tablet, Rfl: 3  •  naproxen (NAPROSYN) 500 MG tablet, Take 500 mg by mouth As Needed., Disp: , Rfl:   •  nystatin (MYCOSTATIN) 576925 UNIT/GM cream, APPLY ONE APPLICATION TOPICALLY TO  INCISION SITE TWICE DAILY FOR 30 DAYS, Disp: , Rfl:   •  pioglitazone (ACTOS) 15 MG tablet,  "Take 1 tablet by mouth Daily., Disp: 90 tablet, Rfl: 3  •  potassium chloride 10 MEQ CR tablet, TAKE 1 TABLET EVERY DAY, Disp: 30 tablet, Rfl: 0  •  prochlorperazine (COMPAZINE) 10 MG tablet, Take 10 mg by mouth., Disp: , Rfl:   •  Semaglutide, 1 MG/DOSE, (Ozempic, 1 MG/DOSE,) 2 MG/1.5ML solution pen-injector, Inject 1 mg under the skin into the appropriate area as directed 1 (One) Time Per Week., Disp: 9 mL, Rfl: 3  •  Symbicort 80-4.5 MCG/ACT inhaler, Inhale 2 puffs As Needed., Disp: , Rfl:   •  TRUEplus Lancets 28G misc, Use up to 4 times daily to check blood sugar, Disp: 100 each, Rfl: 11  •  vitamin C (ASCORBIC ACID) 500 MG tablet, TAKE 2 TABLETS EVERY DAY, Disp: 60 tablet, Rfl: 3    Review of Systems   Eyes: Negative for visual disturbance.   Cardiovascular: Negative for claudication and cyanosis.   Respiratory: Negative for shortness of breath.    Skin: Positive for color change, dry skin and nail changes.   Musculoskeletal: Positive for muscle weakness.   Gastrointestinal: Negative for dysphagia.   Neurological: Positive for numbness and paresthesias. Negative for focal weakness, light-headedness, loss of balance and weakness.   Psychiatric/Behavioral: Negative for altered mental status.   All other systems reviewed and are negative.       Objective   Vitals:    02/10/23 1414   BP: 149/84   BP Location: Left arm   Patient Position: Sitting   Cuff Size: Adult   Pulse: 90   SpO2: 98%   Weight: (!) 166 kg (365 lb)   Height: 182.9 cm (72\")     Body mass index is 49.5 kg/m².  Physical Exam  Vitals reviewed.   Constitutional:       Appearance: Normal appearance.   HENT:      Head: Normocephalic.      Nose: Nose normal.      Mouth/Throat:      Mouth: Mucous membranes are moist.   Eyes:      Extraocular Movements: Extraocular movements intact.   Neck:      Vascular: No carotid bruit.   Cardiovascular:      Rate and Rhythm: Normal rate and regular rhythm.      Pulses:           Dorsalis pedis pulses are 2+ on the " right side and 2+ on the left side.        Posterior tibial pulses are 2+ on the right side and 2+ on the left side.      Heart sounds: Normal heart sounds.   Pulmonary:      Effort: Pulmonary effort is normal.      Breath sounds: Normal breath sounds.   Abdominal:      General: Bowel sounds are normal.      Palpations: Abdomen is soft.   Musculoskeletal:         General: Normal range of motion.      Cervical back: Normal range of motion.      Right lower leg: Edema present.      Left lower leg: Edema present.   Skin:     General: Skin is warm and dry.      Capillary Refill: Capillary refill takes less than 2 seconds.   Neurological:      General: No focal deficit present.      Mental Status: She is alert and oriented to person, place, and time.      Gait: Gait normal.   Psychiatric:         Mood and Affect: Mood normal.         Behavior: Behavior normal.           Assessment & Plan     Independent Review of Radiographic Studies:  Detailed discussion regarding risks, benefits, and treatment plan. Images independently reviewed. Patient understands, agrees, and wishes to proceed with plan.     1. Venous engorgement  -Weight loss Efforts  -Normal arterial perfusion  -Normal venous duplex    Follow up PCP    2. Idiopathic peripheral neuropathy  Trial Gabapentin/Lyrica  Will discuss with PCP    3. Class 3 severe obesity due to excess calories without serious comorbidity with body mass index (BMI) of 45.0 to 49.9 in adult (HCC)  Your BMI is Body mass index is 49.5 kg/m². and falls within  Obese Class III extreme obesity: > or equal to 40kg/m2. BMI is strongly correlated with increased health risks. Review options for weight management, heart healthy diet, and exercise programs.        Advance Care Planning discussed and  Informational packet given to patient. Advance Care Plan on file: No    Time spent 30 minutes in face to face evaluation, reviewing of medical history, tests, and procedures. Independent interpretation of  vascular studies, ordering additional tests, documentation, and coordination of care.   Counseling and education with the patient and family regarding treatment options, plan of care, and hoped for outcomes. All questions answered.           This document has been electronically signed by INES Romero on February 13, 2023 12:06 CST

## 2023-02-21 RX ORDER — POTASSIUM CHLORIDE 750 MG/1
TABLET, FILM COATED, EXTENDED RELEASE ORAL
Qty: 30 TABLET | Refills: 0 | Status: SHIPPED | OUTPATIENT
Start: 2023-02-21 | End: 2023-03-22

## 2023-02-23 ENCOUNTER — OFFICE VISIT (OUTPATIENT)
Dept: PODIATRY | Facility: CLINIC | Age: 58
End: 2023-02-23
Payer: MEDICARE

## 2023-02-23 VITALS — HEIGHT: 72 IN | WEIGHT: 293 LBS | BODY MASS INDEX: 39.68 KG/M2

## 2023-02-23 DIAGNOSIS — B35.1 ONYCHOMYCOSIS: ICD-10-CM

## 2023-02-23 DIAGNOSIS — E11.8 DIABETIC FOOT: Primary | ICD-10-CM

## 2023-02-23 DIAGNOSIS — E11.65 TYPE 2 DIABETES MELLITUS WITH HYPERGLYCEMIA, UNSPECIFIED WHETHER LONG TERM INSULIN USE: ICD-10-CM

## 2023-02-23 DIAGNOSIS — M20.41 HAMMER TOES OF BOTH FEET: ICD-10-CM

## 2023-02-23 DIAGNOSIS — M20.42 HAMMER TOES OF BOTH FEET: ICD-10-CM

## 2023-02-23 PROCEDURE — 11721 DEBRIDE NAIL 6 OR MORE: CPT | Performed by: NURSE PRACTITIONER

## 2023-02-23 PROCEDURE — 99212 OFFICE O/P EST SF 10 MIN: CPT | Performed by: NURSE PRACTITIONER

## 2023-02-23 NOTE — PROGRESS NOTES
Janette Chadwick  1965  57 y.o. female   PCP: Nata Morgan 01/09/23  BS: 112 per pt       02/23/2023      Chief Complaint   Patient presents with   • Right Foot - Pain, Follow-up     Diabetic foot care      • Left Foot - Pain, Follow-up     Diabetic foot care        History of Present Illness    Janette Chadwick is a 57 y.o.female presents to the clinic today for a diabetic foot exam and diabetic foot care       Past Medical History:   Diagnosis Date   • Arthritis    • Asthma    • Carpal tunnel syndrome 2020   • CHF (congestive heart failure) (HCC)    • COPD (chronic obstructive pulmonary disease) (HCC)    • Diabetes mellitus (HCC)    • Disease of thyroid gland    • Elevated cholesterol    • GERD (gastroesophageal reflux disease)    • Hyperlipidemia    • Hypertension    • Sleep apnea          Past Surgical History:   Procedure Laterality Date   • CHOLECYSTECTOMY     • CHOLECYSTECTOMY     • FRACTURE SURGERY     • HYSTERECTOMY     • JOINT REPLACEMENT     • KIDNEY STONE SURGERY     • TIBIA FRACTURE SURGERY     • TUBAL ABDOMINAL LIGATION           History reviewed. No pertinent family history.    Allergies   Allergen Reactions   • Adhesive Tape Rash   • Other Rash   • Penicillins Rash, Hives and Itching       Social History     Socioeconomic History   • Marital status:    Tobacco Use   • Smoking status: Never     Passive exposure: Never   • Smokeless tobacco: Never   Vaping Use   • Vaping Use: Never used   Substance and Sexual Activity   • Alcohol use: No   • Drug use: No   • Sexual activity: Defer         Current Outpatient Medications   Medication Sig Dispense Refill   • albuterol sulfate  (90 Base) MCG/ACT inhaler Inhale 2 puffs Every 4 (Four) Hours As Needed for Wheezing. 54 g 3   • amitriptyline (ELAVIL) 100 MG tablet Take 1 tablet by mouth every night at bedtime. 90 tablet 3   • aspirin 81 MG EC tablet Take 1 tablet by mouth Daily. 30 tablet 11   • atorvastatin (LIPITOR) 20 MG tablet Take 1  tablet by mouth Daily. 90 tablet 3   • BD Sharps Container Home misc Use daily to dispose needles 1 each 3   • calcium carbonate (OS-DIVYA) 600 MG tablet Take 600 mg by mouth Daily.     • carvedilol (COREG) 3.125 MG tablet Take 1 tablet by mouth 2 (Two) Times a Day With Meals. 180 tablet 3   • cholecalciferol (VITAMIN D3) 25 MCG (1000 UT) tablet Take 2 tablets by mouth Daily. 180 tablet 3   • Cobalamin Combinations (B-12) 100-5000 MCG sublingual tablet B12   1000 daily     • cromolyn (OPTICROM) 4 % ophthalmic solution Administer 1 drop to both eyes Daily.     • cyclobenzaprine (FLEXERIL) 10 MG tablet Take 1 tablet by mouth 2 (Two) Times a Day As Needed for Muscle Spasms. 180 tablet 3   • docusate sodium 100 MG capsule Take 1 capsule by mouth Daily. 90 each 0   • DULoxetine (CYMBALTA) 60 MG capsule Take 1 capsule by mouth Daily. 90 capsule 3   • Emgality 120 MG/ML solution prefilled syringe Inject 1 mL under the skin into the appropriate area as directed Every 30 (Thirty) Days. 3.36 mL 3   • empagliflozin (Jardiance) 10 MG tablet tablet Take 1 tablet by mouth Daily. 90 tablet 3   • esomeprazole (nexIUM) 20 MG capsule Take 1 capsule by mouth Every Morning Before Breakfast. 90 capsule 3   • ferrous sulfate 325 (65 FE) MG tablet Take 1 tablet by mouth Daily With Breakfast. 90 tablet 3   • Fetzima 40 MG capsule sustained-release 24 hr TAKE ONE CAPSULE BY MOUTH ONCE EVERY DAY     • fluticasone (FLONASE) 50 MCG/ACT nasal spray 2 sprays into the nostril(s) as directed by provider Daily.     • FREESTYLE LITE test strip USE ONE STRIP TO CHECK BLOOD GLUCOSE TWICE DAILY     • furosemide (LASIX) 40 MG tablet Take 40 mg by mouth Daily.     • glucose blood (IGlucose Test Strips) test strip Use as instructed 50 each 11   • hydrOXYzine (ATARAX) 25 MG tablet TAKE 1 TABLET EVERY DAY AS NEEDED FOR ANXIETY 30 tablet 0   • levothyroxine (SYNTHROID, LEVOTHROID) 175 MCG tablet Take 1 tablet by mouth Daily. 90 tablet 3   • Linzess 72 MCG  "capsule capsule Take 1 capsule by mouth Daily. 90 capsule 3   • lisinopril-hydrochlorothiazide (PRINZIDE,ZESTORETIC) 20-12.5 MG per tablet Take 1 tablet by mouth Daily. 90 tablet 3   • loratadine (CLARITIN) 10 MG tablet Take 1 tablet by mouth Daily. 90 tablet 3   • magnesium gluconate (MAGONATE) 500 MG tablet magnesium 500 mg tablet   Take by oral route.     • meloxicam (Mobic) 15 MG tablet Take 1 tablet by mouth Daily. 30 tablet 3   • metFORMIN (GLUCOPHAGE) 1000 MG tablet Take 1 tablet by mouth 2 (Two) Times a Day With Meals. 180 tablet 3   • naproxen (NAPROSYN) 500 MG tablet Take 500 mg by mouth As Needed.     • nystatin (MYCOSTATIN) 543738 UNIT/GM cream APPLY ONE APPLICATION TOPICALLY TO  INCISION SITE TWICE DAILY FOR 30 DAYS     • pioglitazone (ACTOS) 15 MG tablet Take 1 tablet by mouth Daily. 90 tablet 3   • potassium chloride 10 MEQ CR tablet TAKE 1 TABLET EVERY DAY 30 tablet 0   • prochlorperazine (COMPAZINE) 10 MG tablet Take 10 mg by mouth.     • Semaglutide, 1 MG/DOSE, (Ozempic, 1 MG/DOSE,) 2 MG/1.5ML solution pen-injector Inject 1 mg under the skin into the appropriate area as directed 1 (One) Time Per Week. 9 mL 3   • Symbicort 80-4.5 MCG/ACT inhaler Inhale 2 puffs As Needed.     • TRUEplus Lancets 28G misc Use up to 4 times daily to check blood sugar 100 each 11   • vitamin C (ASCORBIC ACID) 500 MG tablet TAKE 2 TABLETS EVERY DAY 60 tablet 3     No current facility-administered medications for this visit.       Review of Systems   Constitutional: Negative.    HENT: Negative.    Eyes: Negative.    Respiratory: Negative.    Cardiovascular: Negative.    Gastrointestinal: Negative.    Endocrine: Negative.    Genitourinary: Negative.    Musculoskeletal:        Foot pain   Skin: Negative.    Allergic/Immunologic: Negative.    Neurological: Negative.    Hematological: Negative.    Psychiatric/Behavioral: Negative.          OBJECTIVE    Ht 182.9 cm (72\")   Wt (!) 166 kg (365 lb)   BMI 49.50 kg/m² "     Physical Exam  Vitals reviewed.   Constitutional:       Appearance: Normal appearance. She is well-developed.   HENT:      Head: Normocephalic and atraumatic.   Neck:      Trachea: Trachea and phonation normal.   Cardiovascular:      Pulses:           Dorsalis pedis pulses are detected w/ Doppler on the right side and detected w/ Doppler on the left side.        Posterior tibial pulses are detected w/ Doppler on the right side and detected w/ Doppler on the left side.   Pulmonary:      Effort: Pulmonary effort is normal. No respiratory distress.   Abdominal:      General: There is no distension.      Palpations: Abdomen is soft.   Musculoskeletal:      Right foot: Normal range of motion.      Left foot: Normal range of motion.   Feet:      Right foot:      Protective Sensation: 10 sites tested. 10 sites sensed.      Skin integrity: Skin integrity normal.      Toenail Condition: Right toenails are long.      Left foot:      Protective Sensation: 10 sites tested. 10 sites sensed.      Skin integrity: Skin integrity normal.      Toenail Condition: Left toenails are long.   Skin:     General: Skin is warm and dry.   Neurological:      Mental Status: She is alert and oriented to person, place, and time.      GCS: GCS eye subscore is 4. GCS verbal subscore is 5. GCS motor subscore is 6.   Psychiatric:         Speech: Speech normal.         Behavior: Behavior normal. Behavior is cooperative.         Thought Content: Thought content normal.         Judgment: Judgment normal.                Procedures        ASSESSMENT AND PLAN    Diagnoses and all orders for this visit:    1. Diabetic foot (HCC) (Primary)    2. Hammer toes of both feet    3. Type 2 diabetes mellitus with hyperglycemia, unspecified whether long term insulin use (HCC)    4. Onychomycosis      - A diabetic foot screening exam was performed and the patient was educated on the foot complications related to diabetes,  preventative foot care and what signs and  symptoms to watch for.  Instructed to contact our office if any foot problems develop before next visit.  -Discussed treatments for  painful toenails. Treatment options discussed included nail removal versus debridement. Patient elected for routine nail debridement. An ABN has been signed by the patient.  - Toenails 1-5 bilateral were debrided in length and thickness with nail nipper and electric  to decrease fungal load and risk of infection.  - All the patients questions were answered.  - RTC 3 months or sooner if needed.           This document has been electronically signed by Arsh CHACON, FNP-C, ONP-C on February 23, 2023 10:31 CST

## 2023-03-02 ENCOUNTER — LAB (OUTPATIENT)
Dept: LAB | Facility: HOSPITAL | Age: 58
End: 2023-03-02
Payer: MEDICARE

## 2023-03-02 ENCOUNTER — OFFICE VISIT (OUTPATIENT)
Dept: FAMILY MEDICINE CLINIC | Facility: CLINIC | Age: 58
End: 2023-03-02
Payer: MEDICARE

## 2023-03-02 VITALS
BODY MASS INDEX: 39.68 KG/M2 | HEIGHT: 72 IN | SYSTOLIC BLOOD PRESSURE: 110 MMHG | OXYGEN SATURATION: 93 % | DIASTOLIC BLOOD PRESSURE: 68 MMHG | WEIGHT: 293 LBS | HEART RATE: 102 BPM

## 2023-03-02 DIAGNOSIS — K21.9 GASTRO-ESOPHAGEAL REFLUX DISEASE WITHOUT ESOPHAGITIS: ICD-10-CM

## 2023-03-02 DIAGNOSIS — G89.29 CHRONIC MIDLINE BACK PAIN, UNSPECIFIED BACK LOCATION: ICD-10-CM

## 2023-03-02 DIAGNOSIS — E55.9 VITAMIN D DEFICIENCY: ICD-10-CM

## 2023-03-02 DIAGNOSIS — M54.9 CHRONIC MIDLINE BACK PAIN, UNSPECIFIED BACK LOCATION: ICD-10-CM

## 2023-03-02 DIAGNOSIS — E78.2 MIXED HYPERLIPIDEMIA: ICD-10-CM

## 2023-03-02 DIAGNOSIS — E11.65 TYPE 2 DIABETES MELLITUS WITH HYPERGLYCEMIA, WITHOUT LONG-TERM CURRENT USE OF INSULIN: ICD-10-CM

## 2023-03-02 DIAGNOSIS — I10 ESSENTIAL HYPERTENSION: ICD-10-CM

## 2023-03-02 DIAGNOSIS — E11.8 DM (DIABETES MELLITUS), TYPE 2 WITH COMPLICATIONS: ICD-10-CM

## 2023-03-02 DIAGNOSIS — Z79.899 OTHER LONG TERM (CURRENT) DRUG THERAPY: ICD-10-CM

## 2023-03-02 DIAGNOSIS — E11.42 DM TYPE 2 WITH DIABETIC PERIPHERAL NEUROPATHY: ICD-10-CM

## 2023-03-02 DIAGNOSIS — F41.9 ANXIETY: Primary | ICD-10-CM

## 2023-03-02 DIAGNOSIS — J44.9 CHRONIC OBSTRUCTIVE PULMONARY DISEASE, UNSPECIFIED COPD TYPE: ICD-10-CM

## 2023-03-02 DIAGNOSIS — B37.2 SKIN YEAST INFECTION: ICD-10-CM

## 2023-03-02 DIAGNOSIS — N62 LARGE BREASTS: ICD-10-CM

## 2023-03-02 DIAGNOSIS — E03.9 HYPOTHYROIDISM, UNSPECIFIED TYPE: ICD-10-CM

## 2023-03-02 LAB
ALBUMIN SERPL-MCNC: 4.2 G/DL (ref 3.5–5.2)
ALBUMIN UR-MCNC: 2.6 MG/DL
ALBUMIN/GLOB SERPL: 0.9 G/DL
ALP SERPL-CCNC: 117 U/L (ref 39–117)
ALT SERPL W P-5'-P-CCNC: 41 U/L (ref 1–33)
AMPHET+METHAMPHET UR QL: NEGATIVE
AMPHETAMINES UR QL: NEGATIVE
ANION GAP SERPL CALCULATED.3IONS-SCNC: 15 MMOL/L (ref 5–15)
AST SERPL-CCNC: 39 U/L (ref 1–32)
BARBITURATES UR QL SCN: NEGATIVE
BASOPHILS # BLD AUTO: 0.08 10*3/MM3 (ref 0–0.2)
BASOPHILS NFR BLD AUTO: 0.6 % (ref 0–1.5)
BENZODIAZ UR QL SCN: NEGATIVE
BILIRUB SERPL-MCNC: 1.5 MG/DL (ref 0–1.2)
BUN SERPL-MCNC: 9 MG/DL (ref 6–20)
BUN/CREAT SERPL: 12.5 (ref 7–25)
BUPRENORPHINE SERPL-MCNC: NEGATIVE NG/ML
CALCIUM SPEC-SCNC: 9.9 MG/DL (ref 8.6–10.5)
CANNABINOIDS SERPL QL: NEGATIVE
CHLORIDE SERPL-SCNC: 95 MMOL/L (ref 98–107)
CHOLEST SERPL-MCNC: 154 MG/DL (ref 0–200)
CO2 SERPL-SCNC: 27 MMOL/L (ref 22–29)
COCAINE UR QL: NEGATIVE
CREAT SERPL-MCNC: 0.72 MG/DL (ref 0.57–1)
CREAT UR-MCNC: 113.8 MG/DL
DEPRECATED RDW RBC AUTO: 47.4 FL (ref 37–54)
EGFRCR SERPLBLD CKD-EPI 2021: 97.1 ML/MIN/1.73
EOSINOPHIL # BLD AUTO: 0.42 10*3/MM3 (ref 0–0.4)
EOSINOPHIL NFR BLD AUTO: 3.4 % (ref 0.3–6.2)
ERYTHROCYTE [DISTWIDTH] IN BLOOD BY AUTOMATED COUNT: 17.1 % (ref 12.3–15.4)
GLOBULIN UR ELPH-MCNC: 4.5 GM/DL
GLUCOSE SERPL-MCNC: 132 MG/DL (ref 65–99)
HBA1C MFR BLD: 6.2 % (ref 4.8–5.6)
HCT VFR BLD AUTO: 46.9 % (ref 34–46.6)
HDLC SERPL-MCNC: 33 MG/DL (ref 40–60)
HGB BLD-MCNC: 15.1 G/DL (ref 12–15.9)
IMM GRANULOCYTES # BLD AUTO: 0.04 10*3/MM3 (ref 0–0.05)
IMM GRANULOCYTES NFR BLD AUTO: 0.3 % (ref 0–0.5)
LDLC SERPL CALC-MCNC: 95 MG/DL (ref 0–100)
LDLC/HDLC SERPL: 2.78 {RATIO}
LYMPHOCYTES # BLD AUTO: 3.84 10*3/MM3 (ref 0.7–3.1)
LYMPHOCYTES NFR BLD AUTO: 31.1 % (ref 19.6–45.3)
MCH RBC QN AUTO: 26.4 PG (ref 26.6–33)
MCHC RBC AUTO-ENTMCNC: 32.2 G/DL (ref 31.5–35.7)
MCV RBC AUTO: 82.1 FL (ref 79–97)
METHADONE UR QL SCN: NEGATIVE
MICROALBUMIN/CREAT UR: 22.8 MG/G
MONOCYTES # BLD AUTO: 0.91 10*3/MM3 (ref 0.1–0.9)
MONOCYTES NFR BLD AUTO: 7.4 % (ref 5–12)
NEUTROPHILS NFR BLD AUTO: 57.2 % (ref 42.7–76)
NEUTROPHILS NFR BLD AUTO: 7.06 10*3/MM3 (ref 1.7–7)
NRBC BLD AUTO-RTO: 0 /100 WBC (ref 0–0.2)
OPIATES UR QL: NEGATIVE
OXYCODONE UR QL SCN: NEGATIVE
PCP UR QL SCN: NEGATIVE
PLATELET # BLD AUTO: 411 10*3/MM3 (ref 140–450)
PMV BLD AUTO: 10.3 FL (ref 6–12)
POTASSIUM SERPL-SCNC: 3 MMOL/L (ref 3.5–5.2)
PROPOXYPH UR QL: NEGATIVE
PROT SERPL-MCNC: 8.7 G/DL (ref 6–8.5)
RBC # BLD AUTO: 5.71 10*6/MM3 (ref 3.77–5.28)
SODIUM SERPL-SCNC: 137 MMOL/L (ref 136–145)
TRICYCLICS UR QL SCN: NEGATIVE
TRIGL SERPL-MCNC: 147 MG/DL (ref 0–150)
TSH SERPL DL<=0.05 MIU/L-ACNC: 0.67 UIU/ML (ref 0.27–4.2)
VIT B12 BLD-MCNC: 894 PG/ML (ref 211–946)
VLDLC SERPL-MCNC: 26 MG/DL (ref 5–40)
WBC NRBC COR # BLD: 12.35 10*3/MM3 (ref 3.4–10.8)

## 2023-03-02 PROCEDURE — 99214 OFFICE O/P EST MOD 30 MIN: CPT | Performed by: NURSE PRACTITIONER

## 2023-03-02 PROCEDURE — 80306 DRUG TEST PRSMV INSTRMNT: CPT

## 2023-03-02 PROCEDURE — 80061 LIPID PANEL: CPT

## 2023-03-02 PROCEDURE — 85025 COMPLETE CBC W/AUTO DIFF WBC: CPT

## 2023-03-02 PROCEDURE — 82570 ASSAY OF URINE CREATININE: CPT

## 2023-03-02 PROCEDURE — 82043 UR ALBUMIN QUANTITATIVE: CPT

## 2023-03-02 PROCEDURE — 82607 VITAMIN B-12: CPT

## 2023-03-02 PROCEDURE — 84443 ASSAY THYROID STIM HORMONE: CPT

## 2023-03-02 PROCEDURE — 80053 COMPREHEN METABOLIC PANEL: CPT

## 2023-03-02 PROCEDURE — 83036 HEMOGLOBIN GLYCOSYLATED A1C: CPT

## 2023-03-02 RX ORDER — SEMAGLUTIDE 2.68 MG/ML
2 INJECTION, SOLUTION SUBCUTANEOUS WEEKLY
Qty: 3 ML | Refills: 11 | Status: SHIPPED | OUTPATIENT
Start: 2023-03-02

## 2023-03-02 RX ORDER — PREGABALIN 50 MG/1
50 CAPSULE ORAL 3 TIMES DAILY
Qty: 90 CAPSULE | Refills: 0 | Status: SHIPPED | OUTPATIENT
Start: 2023-03-02 | End: 2023-04-03 | Stop reason: SDUPTHER

## 2023-03-02 NOTE — PROGRESS NOTES
Chief Complaint  Follow-up (3 month )    Subjective          Janette Chadwick presents to [facility] [place] For follow up visit. She would like to discuss neuropathy pain as well getting a breast reduction. Her breast are causing her back pain, and she frequently gets yeast infections under both breast. Her breast make it difficult for her to ambulate.   Hypertension  This is a chronic problem. The current episode started more than 1 year ago. The problem is controlled. Associated symptoms include anxiety, headaches, malaise/fatigue and shortness of breath. There are no associated agents to hypertension. Risk factors for coronary artery disease include diabetes mellitus, dyslipidemia and obesity. Current antihypertension treatment includes beta blockers, alpha 1 blockers and diuretics. The current treatment provides moderate improvement. Hypertensive end-organ damage includes heart failure. Identifiable causes of hypertension include sleep apnea and a thyroid problem.   Anxiety  Presents for follow-up visit. Onset was at an unknown time. The problem has been waxing and waning. Symptoms include depressed mood, irritability, nervous/anxious behavior and shortness of breath. Symptoms occur most days. The severity of symptoms is mild. Nothing aggravates the symptoms.     Her past medical history is significant for anxiety/panic attacks and depression. Past treatments include SSRIs and non-SSRI antidepressants. The treatment provided mild relief. Compliance with prior treatments has been good.   Hyperlipidemia  This is a chronic problem. The current episode started more than 1 year ago. Exacerbating diseases include diabetes, hypothyroidism and obesity. Factors aggravating her hyperlipidemia include beta blockers. Associated symptoms include shortness of breath. Current antihyperlipidemic treatment includes statins. Risk factors for coronary artery disease include  diabetes mellitus, dyslipidemia, obesity and hypertension.   Diabetes  She presents for her follow-up diabetic visit. She has type 2 diabetes mellitus. Her disease course has been worsening. Hypoglycemia symptoms include headaches and nervousness/anxiousness. Associated symptoms include fatigue. Symptoms are worsening. Risk factors for coronary artery disease include dyslipidemia, obesity and hypertension. Current diabetic treatment includes oral agent (dual therapy). She is compliant with treatment most of the time. An ACE inhibitor/angiotensin II receptor blocker is being taken. She sees a podiatrist.Eye exam is current.   COPD  She complains of shortness of breath. This is a chronic problem. The current episode started more than 1 year ago. The problem occurs intermittently. The problem has been waxing and waning. Associated symptoms include dyspnea on exertion, headaches and malaise/fatigue. Her symptoms are aggravated by change in weather and any activity. Her symptoms are alleviated by steroid inhaler. She reports moderate improvement on treatment. Her past medical history is significant for COPD.     Outpatient Medications Prior to Visit   Medication Sig Dispense Refill   • albuterol sulfate  (90 Base) MCG/ACT inhaler Inhale 2 puffs Every 4 (Four) Hours As Needed for Wheezing. 54 g 3   • amitriptyline (ELAVIL) 100 MG tablet Take 1 tablet by mouth every night at bedtime. 90 tablet 3   • aspirin 81 MG EC tablet Take 1 tablet by mouth Daily. 30 tablet 11   • atorvastatin (LIPITOR) 20 MG tablet Take 1 tablet by mouth Daily. 90 tablet 3   • BD Sharps Container Home misc Use daily to dispose needles 1 each 3   • calcium carbonate (OS-DIVYA) 600 MG tablet Take 1 tablet by mouth Daily.     • carvedilol (COREG) 3.125 MG tablet Take 1 tablet by mouth 2 (Two) Times a Day With Meals. 180 tablet 3   • cholecalciferol (VITAMIN D3) 25 MCG (1000 UT) tablet Take 2 tablets by mouth Daily. 180 tablet 3   • Cobalamin  Combinations (B-12) 100-5000 MCG sublingual tablet B12   1000 daily     • cromolyn (OPTICROM) 4 % ophthalmic solution Administer 1 drop to both eyes Daily.     • cyclobenzaprine (FLEXERIL) 10 MG tablet Take 1 tablet by mouth 2 (Two) Times a Day As Needed for Muscle Spasms. 180 tablet 3   • docusate sodium 100 MG capsule Take 1 capsule by mouth Daily. 90 each 0   • DULoxetine (CYMBALTA) 60 MG capsule Take 1 capsule by mouth Daily. 90 capsule 3   • Emgality 120 MG/ML solution prefilled syringe Inject 1 mL under the skin into the appropriate area as directed Every 30 (Thirty) Days. 3.36 mL 3   • empagliflozin (Jardiance) 10 MG tablet tablet Take 1 tablet by mouth Daily. 90 tablet 3   • esomeprazole (nexIUM) 20 MG capsule Take 1 capsule by mouth Every Morning Before Breakfast. 90 capsule 3   • ferrous sulfate 325 (65 FE) MG tablet Take 1 tablet by mouth Daily With Breakfast. 90 tablet 3   • Fetzima 40 MG capsule sustained-release 24 hr TAKE ONE CAPSULE BY MOUTH ONCE EVERY DAY     • fluticasone (FLONASE) 50 MCG/ACT nasal spray 2 sprays into the nostril(s) as directed by provider Daily.     • FREESTYLE LITE test strip USE ONE STRIP TO CHECK BLOOD GLUCOSE TWICE DAILY     • furosemide (LASIX) 40 MG tablet Take 1 tablet by mouth Daily.     • glucose blood (IGlucose Test Strips) test strip Use as instructed 50 each 11   • hydrOXYzine (ATARAX) 25 MG tablet TAKE 1 TABLET EVERY DAY AS NEEDED FOR ANXIETY 30 tablet 0   • levothyroxine (SYNTHROID, LEVOTHROID) 175 MCG tablet Take 1 tablet by mouth Daily. 90 tablet 3   • Linzess 72 MCG capsule capsule Take 1 capsule by mouth Daily. 90 capsule 3   • lisinopril-hydrochlorothiazide (PRINZIDE,ZESTORETIC) 20-12.5 MG per tablet Take 1 tablet by mouth Daily. 90 tablet 3   • loratadine (CLARITIN) 10 MG tablet Take 1 tablet by mouth Daily. 90 tablet 3   • magnesium gluconate (MAGONATE) 500 MG tablet magnesium 500 mg tablet   Take by oral route.     • meloxicam (Mobic) 15 MG tablet Take 1  "tablet by mouth Daily. 30 tablet 3   • metFORMIN (GLUCOPHAGE) 1000 MG tablet Take 1 tablet by mouth 2 (Two) Times a Day With Meals. 180 tablet 3   • naproxen (NAPROSYN) 500 MG tablet Take 1 tablet by mouth As Needed.     • nystatin (MYCOSTATIN) 166534 UNIT/GM cream APPLY ONE APPLICATION TOPICALLY TO  INCISION SITE TWICE DAILY FOR 30 DAYS     • pioglitazone (ACTOS) 15 MG tablet Take 1 tablet by mouth Daily. 90 tablet 3   • potassium chloride 10 MEQ CR tablet TAKE 1 TABLET EVERY DAY 30 tablet 0   • prochlorperazine (COMPAZINE) 10 MG tablet Take 1 tablet by mouth.     • Symbicort 80-4.5 MCG/ACT inhaler Inhale 2 puffs As Needed.     • TRUEplus Lancets 28G misc Use up to 4 times daily to check blood sugar 100 each 11   • vitamin C (ASCORBIC ACID) 500 MG tablet TAKE 2 TABLETS EVERY DAY 60 tablet 3   • Semaglutide, 1 MG/DOSE, (Ozempic, 1 MG/DOSE,) 2 MG/1.5ML solution pen-injector Inject 1 mg under the skin into the appropriate area as directed 1 (One) Time Per Week. 9 mL 3     No facility-administered medications prior to visit.       Review of Systems   Constitutional: Positive for fatigue, irritability and malaise/fatigue.   Respiratory: Positive for shortness of breath.    Cardiovascular: Positive for dyspnea on exertion.   Neurological: Positive for headaches.   Psychiatric/Behavioral: The patient is nervous/anxious.          Objective   Vital Signs:   Visit Vitals  /68 (BP Location: Right arm, Patient Position: Sitting, Cuff Size: Large Adult)   Pulse 102   Ht 182.9 cm (72.01\")   Wt (!) 166 kg (365 lb)   SpO2 93%   BMI 49.49 kg/m²     Physical Exam  Vitals and nursing note reviewed.   Constitutional:       Appearance: She is well-developed.   HENT:      Head: Normocephalic and atraumatic.   Eyes:      General: Lids are normal.      Conjunctiva/sclera: Conjunctivae normal.   Neck:      Thyroid: No thyroid mass or thyromegaly.      Trachea: Trachea normal. No tracheal tenderness.   Cardiovascular:      " Rate and Rhythm: Normal rate.      Pulses: Normal pulses.      Heart sounds: Normal heart sounds.   Pulmonary:      Effort: Pulmonary effort is normal. No respiratory distress.      Breath sounds: Normal breath sounds. No wheezing.   Chest:   Breasts:     Right: Tenderness present.      Left: Tenderness present.       Abdominal:      General: There is no distension.      Palpations: Abdomen is soft. There is no mass.   Musculoskeletal:         General: Normal range of motion.      Cervical back: Normal range of motion. No edema.   Skin:     General: Skin is warm and dry.      Coloration: Skin is not pale.      Findings: No abrasion, erythema or lesion.   Neurological:      Mental Status: She is alert and oriented to person, place, and time.   Psychiatric:         Mood and Affect: Mood is not anxious. Affect is not inappropriate.         Speech: Speech normal.         Behavior: Behavior normal.         Thought Content: Thought content normal.         Judgment: Judgment normal. Judgment is not impulsive.        Result Review :                 Assessment and Plan    Diagnoses and all orders for this visit:    1. Anxiety (Primary)    2. DM (diabetes mellitus), type 2 with complications (HCC)  -     Semaglutide, 2 MG/DOSE, (Ozempic, 2 MG/DOSE,) 8 MG/3ML solution pen-injector; Inject 2 mg under the skin into the appropriate area as directed 1 (One) Time Per Week.  Dispense: 3 mL; Refill: 11  -     Urine Drug Screen - Urine, Clean Catch; Future    3. Vitamin D deficiency    4. Mixed hyperlipidemia    5. Essential hypertension    6. Hypothyroidism, unspecified type    7. Chronic obstructive pulmonary disease, unspecified COPD type ([facility])    8. Gastro-esophageal reflux disease without esophagitis    9. DM type 2 with diabetic peripheral neuropathy (HCC)  -     pregabalin (Lyrica) 50 MG capsule; Take 1 capsule by mouth 3 (Three) Times a Day.  Dispense: 90 capsule; Refill: 0    10. Other long term (current) drug therapy  -      Urine Drug Screen - Urine, Clean Catch; Future    11. Skin yeast infection  -     Cancel: Ambulatory Referral to Plastic Surgery  -     Ambulatory Referral to Plastic Surgery    12. Chronic midline back pain, unspecified back location  -     Cancel: Ambulatory Referral to Plastic Surgery  -     Ambulatory Referral to Plastic Surgery    13. Large breasts  -     Ambulatory Referral to Plastic Surgery        The current medical regimen is effective;  continue present plan and medications.    Lyrica started, Controlled contract signed     UDS today     Discussed referral to plastic surgery for breast reduction       Follow Up   Return in about 3 months (around 6/2/2023), or if symptoms worsen or fail to improve, for Next scheduled follow up.  Patient was given instructions and counseling regarding her condition or for health maintenance advice. Please see specific information pulled into the AVS if appropriate.           This document has been electronically signed by INES Azevedo on March 2, 2023 09:14 CST  Answers for HPI/ROS submitted by the patient on 2/23/2023  Please describe your symptoms.: Follow up check a1c levels  Have you had these symptoms before?: Yes  How long have you been having these symptoms?: Greater than 2 weeks  Please list any medications you are currently taking for this condition.: You have list  What is the primary reason for your visit?: Other

## 2023-03-22 RX ORDER — POTASSIUM CHLORIDE 750 MG/1
TABLET, FILM COATED, EXTENDED RELEASE ORAL
Qty: 30 TABLET | Refills: 0 | Status: SHIPPED | OUTPATIENT
Start: 2023-03-22

## 2023-03-27 DIAGNOSIS — E11.8 DM (DIABETES MELLITUS), TYPE 2 WITH COMPLICATIONS: ICD-10-CM

## 2023-03-27 RX ORDER — FLUTICASONE PROPIONATE 50 MCG
2 SPRAY, SUSPENSION (ML) NASAL DAILY
Qty: 16 G | Refills: 3 | Status: SHIPPED | OUTPATIENT
Start: 2023-03-27

## 2023-03-27 RX ORDER — LEVOMILNACIPRAN HYDROCHLORIDE 40 MG/1
CAPSULE, EXTENDED RELEASE ORAL
Qty: 30 CAPSULE | Refills: 4 | Status: SHIPPED | OUTPATIENT
Start: 2023-03-27

## 2023-03-27 NOTE — TELEPHONE ENCOUNTER
Incoming Refill Request      Medication requested (name and dose):     Pharmacy where request should be sent:     Additional details provided by patient:     Best call back number:     Does the patient have less than a 3 day supply:  [] Yes  [] No    Socorro Lopez MA  03/27/23, 16:27 CDT

## 2023-04-03 DIAGNOSIS — E11.42 DM TYPE 2 WITH DIABETIC PERIPHERAL NEUROPATHY: ICD-10-CM

## 2023-04-03 RX ORDER — PREGABALIN 50 MG/1
50 CAPSULE ORAL 3 TIMES DAILY
Qty: 90 CAPSULE | Refills: 0 | Status: SHIPPED | OUTPATIENT
Start: 2023-04-03

## 2023-04-18 RX ORDER — POTASSIUM CHLORIDE 750 MG/1
TABLET, FILM COATED, EXTENDED RELEASE ORAL
Qty: 30 TABLET | Refills: 2 | Status: SHIPPED | OUTPATIENT
Start: 2023-04-18

## 2023-05-05 ENCOUNTER — OFFICE VISIT (OUTPATIENT)
Dept: CARDIOLOGY | Facility: CLINIC | Age: 58
End: 2023-05-05
Payer: MEDICARE

## 2023-05-05 VITALS
HEIGHT: 72 IN | HEART RATE: 96 BPM | DIASTOLIC BLOOD PRESSURE: 60 MMHG | TEMPERATURE: 98 F | OXYGEN SATURATION: 98 % | BODY MASS INDEX: 39.68 KG/M2 | WEIGHT: 293 LBS | SYSTOLIC BLOOD PRESSURE: 100 MMHG

## 2023-05-05 DIAGNOSIS — Z86.69 HISTORY OF SLEEP APNEA: ICD-10-CM

## 2023-05-05 DIAGNOSIS — I10 ESSENTIAL HYPERTENSION: Primary | ICD-10-CM

## 2023-05-05 DIAGNOSIS — E66.01 CLASS 3 SEVERE OBESITY DUE TO EXCESS CALORIES WITHOUT SERIOUS COMORBIDITY WITH BODY MASS INDEX (BMI) OF 45.0 TO 49.9 IN ADULT: ICD-10-CM

## 2023-05-05 DIAGNOSIS — E78.2 MIXED HYPERLIPIDEMIA: ICD-10-CM

## 2023-05-05 DIAGNOSIS — I50.32 CHRONIC HEART FAILURE WITH PRESERVED EJECTION FRACTION (HFPEF): ICD-10-CM

## 2023-05-05 RX ORDER — LISINOPRIL AND HYDROCHLOROTHIAZIDE 20; 12.5 MG/1; MG/1
0.5 TABLET ORAL DAILY
Qty: 90 TABLET | Refills: 3
Start: 2023-05-05

## 2023-05-05 RX ORDER — LISINOPRIL AND HYDROCHLOROTHIAZIDE 20; 12.5 MG/1; MG/1
0.5 TABLET ORAL DAILY
Qty: 90 TABLET | Refills: 3 | Status: SHIPPED | OUTPATIENT
Start: 2023-05-05 | End: 2023-05-05

## 2023-05-05 NOTE — PROGRESS NOTES
Janette Chadwick  57 y.o. female      1. Essential hypertension    2. Mixed hyperlipidemia    3. Class 3 severe obesity due to excess calories without serious comorbidity with body mass index (BMI) of 45.0 to 49.9 in adult    4. History of sleep apnea    5. Chronic heart failure with preserved ejection fraction (HFpEF)        History of Present Illness  Janette Chadwick is a 57-year-old female with  morbid obesity, hypertension, hyperlipidemia, diabetes mellitus, COPD, sleep apnea, hypothyroidism, degenerative joint disease, peripheral vascular disease.  She carries a diagnosis of congestive heart failure in the past, most likely secondary to HFpEF.  She had been following up with a cardiologist in [place] on a yearly basis.    Patient denies any chest pain but does have chronic dyspnea on exertion.  She denied any previous documented coronary artery disease and for the most part able to perform her activities of daily living.  She does have circulation issues in the lower extremities and some discoloration of the toes.  She has seen vascular surgery.    Her predominant complaint relates to fluctuating blood pressures and there are times when it is less than 100 systolic.  She has not had any dizziness or syncope.  She has been very compliant with medicines.      MARCO ANTONIO performed on 10/18/2022 showed:  •  Right Conclusion: The right MARCO ANTONIO is normal.  •  Left Conclusion: The left MARCO ANTONIO is moderately reduced.  MARCO ANTONIO on the right side was 0.93 and on the left side 0.84.    She has had a dobutamine stress echocardiogram in June 2019 which showed no definite wall motion abnormalities.    Echocardiogram in January 2023 showed:  •  Left ventricular ejection fraction appears to be 51 - 55%.  •  Left ventricular diastolic function is consistent with (grade I) impaired relaxation.  •  Estimated right ventricular systolic pressure from tricuspid regurgitation is normal (<35 mmHg).  •  Mild dilation of the aortic root is  present.     SUBJECTIVE    Allergies   Allergen Reactions   • Adhesive Tape Rash   • Other Rash   • Penicillins Rash, Hives and Itching         Past Medical History:   Diagnosis Date   • Arthritis    • Asthma    • Carpal tunnel syndrome 2020   • CHF (congestive heart failure)    • COPD (chronic obstructive pulmonary disease)    • Diabetes mellitus    • Disease of thyroid gland    • Elevated cholesterol    • GERD (gastroesophageal reflux disease)    • Hyperlipidemia    • Hypertension    • Sleep apnea          Past Surgical History:   Procedure Laterality Date   • CHOLECYSTECTOMY     • CHOLECYSTECTOMY     • FRACTURE SURGERY     • HYSTERECTOMY     • JOINT REPLACEMENT     • KIDNEY STONE SURGERY     • TIBIA FRACTURE SURGERY     • TUBAL ABDOMINAL LIGATION           History reviewed. No pertinent family history.      Social History     Socioeconomic History   • Marital status:    Tobacco Use   • Smoking status: Never     Passive exposure: Never   • Smokeless tobacco: Never   Vaping Use   • Vaping Use: Never used   Substance and Sexual Activity   • Alcohol use: No   • Drug use: No   • Sexual activity: Defer         Current Outpatient Medications   Medication Sig Dispense Refill   • albuterol sulfate  (90 Base) MCG/ACT inhaler Inhale 2 puffs Every 4 (Four) Hours As Needed for Wheezing. 54 g 3   • amitriptyline (ELAVIL) 100 MG tablet Take 1 tablet by mouth every night at bedtime. 90 tablet 3   • aspirin 81 MG EC tablet Take 1 tablet by mouth Daily. 30 tablet 11   • atorvastatin (LIPITOR) 20 MG tablet Take 1 tablet by mouth Daily. 90 tablet 3   • BD [employer] Home misc Use daily to dispose needles 1 each 3   • calcium carbonate (OS-DIVYA) 600 MG tablet Take 1 tablet by mouth Daily.     • carvedilol (COREG) 3.125 MG tablet Take 1 tablet by mouth 2 (Two) Times a Day With Meals. 180 tablet 3   • cholecalciferol (VITAMIN D3) 25 MCG (1000 UT) tablet Take 2 tablets by mouth Daily. 180 tablet 3   • Cobalamin  Combinations (B-12) 100-5000 MCG sublingual tablet B12   1000 daily     • cyclobenzaprine (FLEXERIL) 10 MG tablet Take 1 tablet by mouth 2 (Two) Times a Day As Needed for Muscle Spasms. 180 tablet 3   • docusate sodium 100 MG capsule Take 1 capsule by mouth Daily. 90 each 0   • DULoxetine (CYMBALTA) 60 MG capsule Take 1 capsule by mouth Daily. 90 capsule 3   • Emgality 120 MG/ML solution prefilled syringe Inject 1 mL under the skin into the appropriate area as directed Every 30 (Thirty) Days. 3.36 mL 3   • empagliflozin (Jardiance) 10 MG tablet tablet Take 1 tablet by mouth Daily. 90 tablet 3   • esomeprazole (nexIUM) 20 MG capsule Take 1 capsule by mouth Every Morning Before Breakfast. 90 capsule 3   • ferrous sulfate 325 (65 FE) MG tablet Take 1 tablet by mouth Daily With Breakfast. 90 tablet 3   • Fetzima 40 MG capsule sustained-release 24 hr TAKE 1 CAPSULE EVERY DAY 30 capsule 4   • fluticasone (FLONASE) 50 MCG/ACT nasal spray 2 sprays into the nostril(s) as directed by provider Daily. 16 g 3   • FREESTYLE LITE test strip USE ONE STRIP TO CHECK BLOOD GLUCOSE TWICE DAILY     • furosemide (LASIX) 40 MG tablet Take 1 tablet by mouth Daily.     • glucose blood (IGlucose Test Strips) test strip Use as instructed 50 each 11   • hydrOXYzine (ATARAX) 25 MG tablet TAKE 1 TABLET EVERY DAY AS NEEDED FOR ANXIETY 30 tablet 0   • levothyroxine (SYNTHROID, LEVOTHROID) 175 MCG tablet Take 1 tablet by mouth Daily. 90 tablet 3   • Linzess 72 MCG capsule capsule Take 1 capsule by mouth Daily. 90 capsule 3   • lisinopril-hydrochlorothiazide (PRINZIDE,ZESTORETIC) 20-12.5 MG per tablet Take 1 tablet by mouth Daily. 90 tablet 3   • loratadine (CLARITIN) 10 MG tablet Take 1 tablet by mouth Daily. 90 tablet 3   • magnesium gluconate (MAGONATE) 500 MG tablet magnesium 500 mg tablet   Take by oral route.     • meloxicam (Mobic) 15 MG tablet Take 1 tablet by mouth Daily. 30 tablet 3   • metFORMIN (GLUCOPHAGE) 1000 MG tablet TAKE 1 TABLET  "BY MOUTH TWICE DAILY WITH MEALS 60 tablet 3   • naproxen (NAPROSYN) 500 MG tablet Take 1 tablet by mouth As Needed.     • nystatin (MYCOSTATIN) 729403 UNIT/GM cream APPLY ONE APPLICATION TOPICALLY TO  INCISION SITE TWICE DAILY FOR 30 DAYS     • pioglitazone (ACTOS) 15 MG tablet Take 1 tablet by mouth Daily. 90 tablet 3   • potassium chloride 10 MEQ CR tablet TAKE 1 TABLET BY MOUTH ONCE DAILY 30 tablet 2   • pregabalin (Lyrica) 50 MG capsule Take 1 capsule by mouth 3 (Three) Times a Day. 90 capsule 0   • prochlorperazine (COMPAZINE) 10 MG tablet Take 1 tablet by mouth.     • Semaglutide, 2 MG/DOSE, (Ozempic, 2 MG/DOSE,) 8 MG/3ML solution pen-injector Inject 2 mg under the skin into the appropriate area as directed 1 (One) Time Per Week. 3 mL 11   • Symbicort 80-4.5 MCG/ACT inhaler Inhale 2 puffs As Needed.     • TRUEplus Lancets 28G misc Use up to 4 times daily to check blood sugar 100 each 11   • vitamin C (ASCORBIC ACID) 500 MG tablet TAKE 2 TABLETS EVERY DAY 60 tablet 3     No current facility-administered medications for this visit.         OBJECTIVE    /60 (BP Location: Left arm, Patient Position: Sitting, Cuff Size: Adult)   Pulse 96   Temp 98 °F (36.7 °C)   Ht 182.9 cm (72\")   Wt (!) 166 kg (365 lb)   SpO2 98%   BMI 49.50 kg/m²         Review of Systems: The following systems reviewed and no changes noted    Constitutional:  Denies recent weight loss, weight gain, fever or chills, no change in exercise tolerance     HENT:  Denies any hearing loss, epistaxis, hoarseness, or difficulty speaking.     Eyes: Wears eyeglasses or contact lenses     Respiratory:  Dyspnea with exertion, no cough, wheezing, or hemoptysis.     Cardiovascular: Negative for palpitations, chest pain, orthopnea, PND    Gastrointestinal:  Denies change in bowel habits, dyspepsia, ulcer disease, hematochezia, or melena.     Endocrine: Negative for cold intolerance, heat intolerance, polydipsia, polyphagia and polyuria.  " Diabetes mellitus, hyperlipidemia, hypothyroidism    Genitourinary: Negative.      Musculoskeletal: DJD    Skin:  Denies any change in hair or nails, rashes, or skin lesions.     Allergic/Immunologic: Negative.  Negative for environmental allergies, food allergies and/or immunocompromised state.     Neurological:  Denies any history of recurrent headaches, strokes, TIA, or seizure disorder.     Hematological: Denies any food allergies, seasonal allergies, bleeding disorders, or lymphadenopathy.     Psychiatric/Behavioral: Denies any history of depression, substance abuse, or change in cognitive function.       Physical Exam: The following systems were reviewed and no changes    Constitutional: Cooperative, alert and oriented, morbidly obese, BMI 49.5    HENT:   Head: Normocephalic, normal hair patterns, no masses or tenderness.  Ears, Nose, and Throat: No gross abnormalities. No pallor or cyanosis. Dentition good.   Eyes: EOMS intact, PERRL, conjunctivae and lids unremarkable. Fundoscopic exam and visual fields not performed.   Neck: No palpable masses or adenopathy, no thyromegaly, no JVD, carotid pulses are full and equal bilaterally and without bruit.     Cardiovascular: Regular rhythm, S1 and S2 normal, no S3 or S4.  No murmurs, gallops, or rubs detected.     Pulmonary/Chest: Chest: normal symmetry, normal respiratory excursion, no intercostal retraction, no use of accessory muscles.     Pulmonary: Normal breath sounds. No rales or rhonchi.    Abdominal: Abdomen soft, bowel sounds normoactive, no masses, no hepatosplenomegaly, nontender, no bruit.     Musculoskeletal: No deformities, clubbing, cyanosis, erythema.  Dusky appearance of the toes both feet    Neurological: No gross motor or sensory deficits noted, affect appropriate, oriented to time, person, place.     Skin: Warm and dry to the touch, no apparent skin lesion or mass noted.     Psychiatric: She has a normal mood and affect. Her behavior is normal.  Judgment and thought content normal.         Procedures      Lab Results   Component Value Date    WBC 12.35 (H) 03/02/2023    HGB 15.1 03/02/2023    HCT 46.9 (H) 03/02/2023    MCV 82.1 03/02/2023     03/02/2023     Lab Results   Component Value Date    GLUCOSE 132 (H) 03/02/2023    BUN 9 03/02/2023    CREATININE 0.72 03/02/2023    EGFRIFNONA >60 01/14/2020    BCR 12.5 03/02/2023    CO2 27.0 03/02/2023    CALCIUM 9.9 03/02/2023    ALBUMIN 4.2 03/02/2023    AST 39 (H) 03/02/2023    ALT 41 (H) 03/02/2023     Lab Results   Component Value Date    CHOL 154 03/02/2023    CHOL 198 11/30/2022    CHOL 153 06/24/2019     Lab Results   Component Value Date    TRIG 147 03/02/2023    TRIG 167 (H) 11/30/2022    TRIG 181 (H) 08/17/2020     Lab Results   Component Value Date    HDL 33 (L) 03/02/2023    HDL 36 (L) 11/30/2022    HDL 38 (L) 08/17/2020     No components found for: LDLCALC  Lab Results   Component Value Date    LDL 95 03/02/2023     (H) 11/30/2022    LDL 80 08/17/2020     No results found for: HDLLDLRATIO  No components found for: CHOLHDL  Lab Results   Component Value Date    HGBA1C 6.20 (H) 03/02/2023     Lab Results   Component Value Date    TSH 0.666 03/02/2023           ASSESSMENT AND PLAN  Janette Chadwick is a 57-year-old female with multiple medical issues as discussed in detail and history of present illness visit established with the practice.  She has no previous documented coronary artery disease.  She carries a diagnosis of congestive heart failure in the background of longstanding morbid obesity, hypertension, hyperlipidemia, diabetes mellitus, peripheral vascular disease, COPD, hypothyroidism.     She is compensated with no evidence of congestive heart failure at this time.  I have encouraged her to maintain a high fluid intake and after reviewing her medicines have cut back on the dose of lisinopril HCTZ to half a tablet of 20/12.5 mg daily with the hope that her blood pressure will improve.   She has been advised to make sure that her diabetes is under control.    Her lab results from March 2023 were reviewed and hemoglobin A1c was 6.2.  LDL was 95 and HDL 35.  CBC was within normal limits and CMP showed BUN of 9 and creatinine 1.72 AST was 39 and ALT 41.    I have continued her current medicines including antiplatelet therapy with aspirin, lipid-lowering therapy with atorvastatin, management of hypertension with carvedilol, lisinopril HCTZ.  She is on both Ozempic and Jardiance.     Diagnoses and all orders for this visit:    1. Essential hypertension (Primary)    2. Mixed hyperlipidemia    3. Class 3 severe obesity due to excess calories without serious comorbidity with body mass index (BMI) of 45.0 to 49.9 in adult    4. History of sleep apnea    5. Chronic heart failure with preserved ejection fraction (HFpEF)        Class 3 Severe Obesity (BMI >=40). Obesity-related health conditions include the following: obstructive sleep apnea, hypertension, diabetes mellitus, dyslipidemias, peripheral vascular disease and osteoarthritis. Obesity is unchanged. BMI is is above average; BMI management plan is completed. We discussed portion control and increasing exercise.      Janette Chadwick  reports that she has never smoked. She has never been exposed to tobacco smoke. She has never used smokeless tobacco..        Marlon Smiley MD  5/5/2023  10:10 CDT

## 2023-05-08 DIAGNOSIS — E11.42 DM TYPE 2 WITH DIABETIC PERIPHERAL NEUROPATHY: ICD-10-CM

## 2023-05-09 ENCOUNTER — OFFICE VISIT (OUTPATIENT)
Dept: ENDOCRINOLOGY | Facility: CLINIC | Age: 58
End: 2023-05-09
Payer: MEDICARE

## 2023-05-09 VITALS
HEIGHT: 72 IN | BODY MASS INDEX: 39.68 KG/M2 | HEART RATE: 106 BPM | WEIGHT: 293 LBS | DIASTOLIC BLOOD PRESSURE: 90 MMHG | OXYGEN SATURATION: 98 % | SYSTOLIC BLOOD PRESSURE: 110 MMHG

## 2023-05-09 DIAGNOSIS — I10 ESSENTIAL HYPERTENSION: ICD-10-CM

## 2023-05-09 DIAGNOSIS — E55.9 VITAMIN D DEFICIENCY: ICD-10-CM

## 2023-05-09 DIAGNOSIS — E78.2 MIXED HYPERLIPIDEMIA: ICD-10-CM

## 2023-05-09 DIAGNOSIS — E03.9 ACQUIRED HYPOTHYROIDISM: ICD-10-CM

## 2023-05-09 DIAGNOSIS — E11.42 DIABETIC POLYNEUROPATHY ASSOCIATED WITH TYPE 2 DIABETES MELLITUS: Primary | ICD-10-CM

## 2023-05-09 PROCEDURE — 3044F HG A1C LEVEL LT 7.0%: CPT | Performed by: NURSE PRACTITIONER

## 2023-05-09 PROCEDURE — 3080F DIAST BP >= 90 MM HG: CPT | Performed by: NURSE PRACTITIONER

## 2023-05-09 PROCEDURE — 99214 OFFICE O/P EST MOD 30 MIN: CPT | Performed by: NURSE PRACTITIONER

## 2023-05-09 PROCEDURE — 1159F MED LIST DOCD IN RCRD: CPT | Performed by: NURSE PRACTITIONER

## 2023-05-09 PROCEDURE — 3074F SYST BP LT 130 MM HG: CPT | Performed by: NURSE PRACTITIONER

## 2023-05-09 PROCEDURE — 1160F RVW MEDS BY RX/DR IN RCRD: CPT | Performed by: NURSE PRACTITIONER

## 2023-05-09 RX ORDER — PREGABALIN 50 MG/1
CAPSULE ORAL
Qty: 90 CAPSULE | Refills: 0 | Status: SHIPPED | OUTPATIENT
Start: 2023-05-09

## 2023-05-09 NOTE — PROGRESS NOTES
"Chief Complaint  Diabetes    Subjective          Janette Edwige Chadwick presents to [facility]  History of Present Illness         In office visit     Referring provider INES Azevedo       Chief Complaint   Patient presents with   • Diabetes       HPI    57 year old female presents for follow up       Diabetes mellitus type 2    Diagnosed in 2002    Timing constant     Quality now controlled     severity moderate     Complications -- + neuropathy       Current diabetes regimen     GLP-1 , jardiance , metformin         Current glucose monitoring     fingerstick     Once daily     Am readings 100 up to 120       Hypothyroidism     Duration  -- diagnosed 30 years     Timing constant     quality - not controlled         Review of Systems - General ROS: negative              Objective   Vital Signs:   /90   Pulse 106   Ht 182.9 cm (72\")   Wt (!) 162 kg (357 lb 1.6 oz)   SpO2 98%   BMI 48.43 kg/m²     Physical Exam  Constitutional:       Appearance: Normal appearance.   Cardiovascular:      Rate and Rhythm: Regular rhythm.      Heart sounds: Normal heart sounds.   Musculoskeletal:         General: Normal range of motion.      Cervical back: Normal range of motion.   Neurological:      Mental Status: She is alert.        Result Review :   The following data was reviewed by: INES Marino on 01/25/2023:  Common labs        11/30/2022    11:34 11/30/2022    11:35 3/2/2023    09:22 3/2/2023    09:50   Common Labs   Glucose  108   132      BUN  19   9      Creatinine  1.18   0.72      Sodium  136   137      Potassium  4.2   3.0      Chloride  94   95      Calcium  10.3   9.9      Albumin  4.60   4.2      Total Bilirubin  0.9   1.5      Alkaline Phosphatase  164   117      AST (SGOT)  33   39      ALT (SGPT)  31   41      WBC  13.57   12.35      Hemoglobin  14.3   15.1      Hematocrit  43.8   46.9      Platelets  474   411      Total Cholesterol  198   154    "   Triglycerides  167   147      HDL Cholesterol  36   33      LDL Cholesterol   132   95      Hemoglobin A1C  7.30   6.20      Microalbumin, Urine 3.3     2.6                   Assessment and Plan    Diagnoses and all orders for this visit:    1. Diabetic polyneuropathy associated with type 2 diabetes mellitus (Primary)    2. Acquired hypothyroidism    3. Mixed hyperlipidemia    4. Essential hypertension    5. Vitamin D deficiency               Glycemic Management:    Diabetes mellitus type 2    Lab Results   Component Value Date    HGBA1C 6.20 (H) 03/02/2023       Taking ozempic 2 mg weekly     Taking jardiance 10 mg daily -      Taking actos 15 mg daily     Taking metformin 1000 mg bid       Her sugars are back in target    We did discuss going up on the Ozempic but she wants to hold off on that          Goals for sugar    Fasting and before meals 80 to 130    2 hours after meals 180 or less      Aim for 45 grams of carbohydrate per meal    Aim for 15 grams of carbohydrate per snack             Microvascular Complications Monitoring       Last eye exam---  March 2022 , no DR     Neuropathy --yes    Taking Lyrica 50 mg TID       No renal disease         Lipid Management:       Taking lipitor 20 mg one at night         Total Cholesterol   Date Value Ref Range Status   03/02/2023 154 0 - 200 mg/dL Final     Triglycerides   Date Value Ref Range Status   03/02/2023 147 0 - 150 mg/dL Final   08/17/2020 181 (H) 0 - 149 mg/dL Final     HDL Cholesterol   Date Value Ref Range Status   03/02/2023 33 (L) 40 - 60 mg/dL Final   08/17/2020 38 (L) 65 - 121 mg/dL Final     Comment:     VALUES>60 MG/DL ARE ASSOCIATED WITH A DECREASED RISK OF  ATHEROSCLEROTIC CARDIOVASCULAR DISEASE     LDL Cholesterol    Date Value Ref Range Status   03/02/2023 95 0 - 100 mg/dL Final   08/17/2020 80 <100 mg/dL Final     Comment:     <100 MG/DL=OPITIMAL    100-129 MG/DL=DESIRABLE    130-159 MG/DL BORDERLINE=INCREASED RISK OF  ATHEROSCLEROTIC  CARDIOVASCULAR DISEASE    > OR = 160 MG/DL=ASSOCIATED WITH AN INCREASE RISK OF  ATHEROSCLEROTIC CARDIOVASCULAR DISEASE         Blood Pressure Management:      Taking lisinopril-hctz 20-12.5 mg daily --- take 1/2 tablet daily       Thyroid Health      Lab Results   Component Value Date    TSH 0.666 03/02/2023       Taking levothyroxine 175 mcg daily          Bone Health       Vitamin d def    Taking supplement       Preventive Care:     Non-smoker                Follow Up   Return in about 3 months (around 8/9/2023) for Recheck.  Patient was given instructions and counseling regarding her condition or for health maintenance advice. Please see specific information pulled into the AVS if appropriate.         This document has been electronically signed by INES Marino on May 9, 2023 08:50 CDT.

## 2023-05-16 RX ORDER — ASCORBIC ACID 500 MG
TABLET ORAL
Qty: 60 TABLET | Refills: 2 | Status: SHIPPED | OUTPATIENT
Start: 2023-05-16

## 2023-05-25 ENCOUNTER — OFFICE VISIT (OUTPATIENT)
Dept: PODIATRY | Facility: CLINIC | Age: 58
End: 2023-05-25
Payer: MEDICARE

## 2023-05-25 VITALS
SYSTOLIC BLOOD PRESSURE: 117 MMHG | HEART RATE: 107 BPM | OXYGEN SATURATION: 97 % | HEIGHT: 72 IN | DIASTOLIC BLOOD PRESSURE: 83 MMHG | BODY MASS INDEX: 48.43 KG/M2

## 2023-05-25 DIAGNOSIS — M20.42 HAMMER TOES OF BOTH FEET: ICD-10-CM

## 2023-05-25 DIAGNOSIS — M20.41 HAMMER TOES OF BOTH FEET: ICD-10-CM

## 2023-05-25 DIAGNOSIS — B35.1 ONYCHOMYCOSIS: Primary | ICD-10-CM

## 2023-05-25 DIAGNOSIS — E11.8 DIABETIC FOOT: ICD-10-CM

## 2023-05-25 DIAGNOSIS — E11.65 TYPE 2 DIABETES MELLITUS WITH HYPERGLYCEMIA, UNSPECIFIED WHETHER LONG TERM INSULIN USE: ICD-10-CM

## 2023-05-25 NOTE — PROGRESS NOTES
Janette Chadwick  1965  57 y.o. female   PCP: Nata Morgan 03/02/2023  BS: 144 per patient       05/25/2023      Chief Complaint   Patient presents with   • Right Foot - Follow-up     Diabetic foot care   • Left Foot - Follow-up     Diabetic foot care       History of Present Illness    Janette Chadwick is a 57 y.o.female presents to the clinic today for a diabetic foot care. patient states she thinks there is a splinter in the side of right hallux. Patient is also experiencing tingling.       Past Medical History:   Diagnosis Date   • Arthritis    • Asthma    • Carpal tunnel syndrome 2020   • CHF (congestive heart failure)    • COPD (chronic obstructive pulmonary disease)    • Diabetes mellitus    • Disease of thyroid gland    • Elevated cholesterol    • GERD (gastroesophageal reflux disease)    • Hyperlipidemia    • Hypertension    • Sleep apnea          Past Surgical History:   Procedure Laterality Date   • CHOLECYSTECTOMY     • CHOLECYSTECTOMY     • FRACTURE SURGERY     • HYSTERECTOMY     • JOINT REPLACEMENT     • KIDNEY STONE SURGERY     • TIBIA FRACTURE SURGERY     • TUBAL ABDOMINAL LIGATION           History reviewed. No pertinent family history.    Allergies   Allergen Reactions   • Adhesive Tape Rash   • Other Rash   • Penicillins Rash, Hives and Itching       Social History     Socioeconomic History   • Marital status:    Tobacco Use   • Smoking status: Never     Passive exposure: Never   • Smokeless tobacco: Never   Vaping Use   • Vaping Use: Never used   Substance and Sexual Activity   • Alcohol use: No   • Drug use: No   • Sexual activity: Defer         Current Outpatient Medications   Medication Sig Dispense Refill   • albuterol sulfate  (90 Base) MCG/ACT inhaler Inhale 2 puffs Every 4 (Four) Hours As Needed for Wheezing. 54 g 3   • amitriptyline (ELAVIL) 100 MG tablet Take 1 tablet by mouth every night at bedtime. 90 tablet 3   • aspirin 81 MG EC tablet Take 1 tablet by mouth  Daily. 30 tablet 11   • atorvastatin (LIPITOR) 20 MG tablet Take 1 tablet by mouth Daily. 90 tablet 3   • BD Sharps Container Home misc Use daily to dispose needles 1 each 3   • calcium carbonate (OS-DIVYA) 600 MG tablet Take 1 tablet by mouth Daily.     • carvedilol (COREG) 3.125 MG tablet Take 1 tablet by mouth 2 (Two) Times a Day With Meals. 180 tablet 3   • cholecalciferol (VITAMIN D3) 25 MCG (1000 UT) tablet Take 2 tablets by mouth Daily. 180 tablet 3   • Cobalamin Combinations (B-12) 100-5000 MCG sublingual tablet B12   1000 daily     • cyclobenzaprine (FLEXERIL) 10 MG tablet Take 1 tablet by mouth 2 (Two) Times a Day As Needed for Muscle Spasms. 180 tablet 3   • docusate sodium 100 MG capsule Take 1 capsule by mouth Daily. 90 each 0   • DULoxetine (CYMBALTA) 60 MG capsule Take 1 capsule by mouth Daily. 90 capsule 3   • Emgality 120 MG/ML solution prefilled syringe Inject 1 mL under the skin into the appropriate area as directed Every 30 (Thirty) Days. 3.36 mL 3   • empagliflozin (Jardiance) 10 MG tablet tablet Take 1 tablet by mouth Daily. 90 tablet 3   • esomeprazole (nexIUM) 20 MG capsule Take 1 capsule by mouth Every Morning Before Breakfast. 90 capsule 3   • ferrous sulfate 325 (65 FE) MG tablet Take 1 tablet by mouth Daily With Breakfast. 90 tablet 3   • Fetzima 40 MG capsule sustained-release 24 hr TAKE 1 CAPSULE EVERY DAY 30 capsule 4   • fluticasone (FLONASE) 50 MCG/ACT nasal spray 2 sprays into the nostril(s) as directed by provider Daily. 16 g 3   • FREESTYLE LITE test strip USE ONE STRIP TO CHECK BLOOD GLUCOSE TWICE DAILY     • furosemide (LASIX) 40 MG tablet Take 1 tablet by mouth Daily.     • glucose blood (IGlucose Test Strips) test strip Use as instructed 50 each 11   • hydrOXYzine (ATARAX) 25 MG tablet TAKE 1 TABLET EVERY DAY AS NEEDED FOR ANXIETY 30 tablet 0   • levothyroxine (SYNTHROID, LEVOTHROID) 175 MCG tablet Take 1 tablet by mouth Daily. 90 tablet 3   • Linzess 72 MCG capsule capsule Take  1 capsule by mouth Daily. 90 capsule 3   • lisinopril-hydrochlorothiazide (PRINZIDE,ZESTORETIC) 20-12.5 MG per tablet Take 0.5 tablets by mouth Daily. 90 tablet 3   • loratadine (CLARITIN) 10 MG tablet Take 1 tablet by mouth Daily. 90 tablet 3   • magnesium gluconate (MAGONATE) 500 MG tablet magnesium 500 mg tablet   Take by oral route.     • meloxicam (Mobic) 15 MG tablet Take 1 tablet by mouth Daily. 30 tablet 3   • metFORMIN (GLUCOPHAGE) 1000 MG tablet TAKE 1 TABLET BY MOUTH TWICE DAILY WITH MEALS 60 tablet 3   • naproxen (NAPROSYN) 500 MG tablet Take 1 tablet by mouth As Needed.     • nystatin (MYCOSTATIN) 896189 UNIT/GM cream APPLY ONE APPLICATION TOPICALLY TO  INCISION SITE TWICE DAILY FOR 30 DAYS     • pioglitazone (ACTOS) 15 MG tablet Take 1 tablet by mouth Daily. 90 tablet 3   • potassium chloride 10 MEQ CR tablet TAKE 1 TABLET BY MOUTH ONCE DAILY 30 tablet 2   • pregabalin (LYRICA) 50 MG capsule TAKE 1 CAPSULE BY MOUTH THREE TIMES DAILY 90 capsule 0   • prochlorperazine (COMPAZINE) 10 MG tablet Take 1 tablet by mouth.     • Semaglutide, 2 MG/DOSE, (Ozempic, 2 MG/DOSE,) 8 MG/3ML solution pen-injector Inject 2 mg under the skin into the appropriate area as directed 1 (One) Time Per Week. 3 mL 11   • Symbicort 80-4.5 MCG/ACT inhaler Inhale 2 puffs As Needed.     • TRUEplus Lancets 28G misc Use up to 4 times daily to check blood sugar 100 each 11   • vitamin C (ASCORBIC ACID) 500 MG tablet TAKE 2 TABLETS EVERY DAY 60 tablet 2     No current facility-administered medications for this visit.       Review of Systems   Constitutional: Negative.    HENT: Negative.    Eyes: Negative.    Respiratory: Negative.    Cardiovascular: Negative.    Gastrointestinal: Negative.    Endocrine: Negative.    Genitourinary: Negative.    Musculoskeletal:        Foot pain   Skin: Negative.    Allergic/Immunologic: Negative.    Neurological: Negative.    Hematological: Negative.    Psychiatric/Behavioral: Negative.    All  "other systems reviewed and are negative.        OBJECTIVE    /83   Pulse 107   Ht 182.9 cm (72\")   SpO2 97%   BMI 48.43 kg/m²     Physical Exam  Vitals reviewed.   Constitutional:       Appearance: Normal appearance. She is well-developed.   HENT:      Head: Normocephalic and atraumatic.   Neck:      Trachea: Trachea and phonation normal.   Cardiovascular:      Pulses:           Dorsalis pedis pulses are detected w/ Doppler on the right side and detected w/ Doppler on the left side.        Posterior tibial pulses are detected w/ Doppler on the right side and detected w/ Doppler on the left side.   Pulmonary:      Effort: Pulmonary effort is normal. No respiratory distress.   Abdominal:      General: There is no distension.      Palpations: Abdomen is soft.   Musculoskeletal:      Right foot: Normal range of motion.      Left foot: Normal range of motion.   Feet:      Right foot:      Protective Sensation: 10 sites tested. 10 sites sensed.      Skin integrity: Skin integrity normal.      Toenail Condition: Right toenails are long.      Left foot:      Protective Sensation: 10 sites tested. 10 sites sensed.      Skin integrity: Skin integrity normal.      Toenail Condition: Left toenails are long.   Skin:     General: Skin is warm and dry.   Neurological:      Mental Status: She is alert and oriented to person, place, and time.      GCS: GCS eye subscore is 4. GCS verbal subscore is 5. GCS motor subscore is 6.   Psychiatric:         Speech: Speech normal.         Behavior: Behavior normal. Behavior is cooperative.         Thought Content: Thought content normal.         Judgment: Judgment normal.                Procedures          ASSESSMENT AND PLAN    Diagnoses and all orders for this visit:    1. Onychomycosis (Primary)    2. Diabetic foot    3. Hammer toes of both feet    4. Type 2 diabetes mellitus with hyperglycemia, unspecified whether long term insulin use         - A diabetic foot screening exam was " performed and the patient was educated on the foot complications related to diabetes,  preventative foot care and what signs and symptoms to watch for.  Instructed to contact our office if any foot problems develop before next visit.  -Discussed treatments for  painful toenails. Treatment options discussed included nail removal versus debridement. Patient elected for routine nail debridement. An ABN has been signed by the patient.  - Toenails 1-5 bilateral were debrided in length and thickness with nail nipper and electric  to decrease fungal load and risk of infection.  - All the patients questions were answered.  - RTC 3 months or sooner if needed.           This document has been electronically signed by Arsh CHACON, FNP-C, ONP-C on May 25, 2023 10:39 CDT

## 2023-06-02 ENCOUNTER — LAB (OUTPATIENT)
Dept: LAB | Facility: HOSPITAL | Age: 58
End: 2023-06-02
Payer: MEDICARE

## 2023-06-02 ENCOUNTER — OFFICE VISIT (OUTPATIENT)
Dept: FAMILY MEDICINE CLINIC | Facility: CLINIC | Age: 58
End: 2023-06-02

## 2023-06-02 VITALS
SYSTOLIC BLOOD PRESSURE: 100 MMHG | HEART RATE: 105 BPM | OXYGEN SATURATION: 97 % | BODY MASS INDEX: 39.68 KG/M2 | WEIGHT: 293 LBS | HEIGHT: 72 IN | DIASTOLIC BLOOD PRESSURE: 80 MMHG

## 2023-06-02 DIAGNOSIS — E78.2 MIXED HYPERLIPIDEMIA: ICD-10-CM

## 2023-06-02 DIAGNOSIS — E55.9 VITAMIN D DEFICIENCY: ICD-10-CM

## 2023-06-02 DIAGNOSIS — Z23 NEED FOR SHINGLES VACCINE: ICD-10-CM

## 2023-06-02 DIAGNOSIS — E11.42 DM TYPE 2 WITH DIABETIC PERIPHERAL NEUROPATHY: ICD-10-CM

## 2023-06-02 DIAGNOSIS — E11.42 DM TYPE 2 WITH DIABETIC PERIPHERAL NEUROPATHY: Primary | ICD-10-CM

## 2023-06-02 DIAGNOSIS — I10 ESSENTIAL HYPERTENSION: ICD-10-CM

## 2023-06-02 DIAGNOSIS — M25.512 ACUTE PAIN OF LEFT SHOULDER: ICD-10-CM

## 2023-06-02 DIAGNOSIS — Z23 NEED FOR PNEUMOCOCCAL VACCINATION: ICD-10-CM

## 2023-06-02 PROBLEM — J45.909 ALLERGIC ASTHMA: Status: ACTIVE | Noted: 2022-07-07

## 2023-06-02 PROCEDURE — 3074F SYST BP LT 130 MM HG: CPT | Performed by: NURSE PRACTITIONER

## 2023-06-02 PROCEDURE — 82306 VITAMIN D 25 HYDROXY: CPT

## 2023-06-02 PROCEDURE — 80061 LIPID PANEL: CPT

## 2023-06-02 PROCEDURE — 80053 COMPREHEN METABOLIC PANEL: CPT

## 2023-06-02 PROCEDURE — 83036 HEMOGLOBIN GLYCOSYLATED A1C: CPT

## 2023-06-02 PROCEDURE — 84443 ASSAY THYROID STIM HORMONE: CPT

## 2023-06-02 PROCEDURE — 82607 VITAMIN B-12: CPT

## 2023-06-02 PROCEDURE — 36415 COLL VENOUS BLD VENIPUNCTURE: CPT

## 2023-06-02 PROCEDURE — 85025 COMPLETE CBC W/AUTO DIFF WBC: CPT

## 2023-06-02 PROCEDURE — 3079F DIAST BP 80-89 MM HG: CPT | Performed by: NURSE PRACTITIONER

## 2023-06-02 PROCEDURE — 3044F HG A1C LEVEL LT 7.0%: CPT | Performed by: NURSE PRACTITIONER

## 2023-06-02 PROCEDURE — 1160F RVW MEDS BY RX/DR IN RCRD: CPT | Performed by: NURSE PRACTITIONER

## 2023-06-02 PROCEDURE — 1159F MED LIST DOCD IN RCRD: CPT | Performed by: NURSE PRACTITIONER

## 2023-06-02 RX ORDER — PREGABALIN 75 MG/1
75 CAPSULE ORAL 3 TIMES DAILY
Qty: 90 CAPSULE | Refills: 0 | Status: SHIPPED | OUTPATIENT
Start: 2023-06-02

## 2023-06-02 RX ORDER — METHYLPREDNISOLONE 4 MG/1
TABLET ORAL
Qty: 21 EACH | Refills: 0 | Status: SHIPPED | OUTPATIENT
Start: 2023-06-02

## 2023-06-02 RX ORDER — LISINOPRIL AND HYDROCHLOROTHIAZIDE 12.5; 1 MG/1; MG/1
1 TABLET ORAL DAILY
Qty: 30 TABLET | Refills: 11 | Status: SHIPPED | OUTPATIENT
Start: 2023-06-02

## 2023-06-02 NOTE — PROGRESS NOTES
Chief Complaint  Follow-up (3 month /Lyrica dose increase. Left shoulder pain. Immunizations. Mammogram )    Subjective          aJnette Chadwick presents to [facility] for follow up. She has been having some issues with her blood pressure going low. She has also noticed left shoulder pain, limited range of motion, and soreness, nothing is helping relieve the pain much. She feels like her Lyrica isnt working as well as it use to.  Hypertension  This is a chronic problem. The current episode started more than 1 year ago. The problem is controlled. Associated symptoms include anxiety, headaches, malaise/fatigue and shortness of breath. There are no associated agents to hypertension. Risk factors for coronary artery disease include diabetes mellitus, dyslipidemia and obesity. Current antihypertension treatment includes beta blockers, alpha 1 blockers and diuretics. The current treatment provides moderate improvement. Hypertensive end-organ damage includes heart failure. Identifiable causes of hypertension include sleep apnea and a thyroid problem.   Anxiety  Presents for follow-up visit. Onset was at an unknown time. The problem has been waxing and waning. Symptoms include depressed mood, irritability, nervous/anxious behavior and shortness of breath. Symptoms occur occasionally. The severity of symptoms is mild. Nothing aggravates the symptoms.     Her past medical history is significant for anxiety/panic attacks and depression. Past treatments include SSRIs and non-SSRI antidepressants. The treatment provided mild relief. Compliance with prior treatments has been good.   Hyperlipidemia  This is a chronic problem. The current episode started more than 1 year ago. Exacerbating diseases include diabetes, hypothyroidism and obesity. Factors aggravating her hyperlipidemia include beta blockers. Associated symptoms include shortness of breath. Current  antihyperlipidemic treatment includes statins. Risk factors for coronary artery disease include diabetes mellitus, dyslipidemia, obesity and hypertension.   Diabetes  She presents for her follow-up diabetic visit. She has type 2 diabetes mellitus. Her disease course has been stable. Hypoglycemia symptoms include headaches and nervousness/anxiousness. Associated symptoms include fatigue. Symptoms are worsening. Risk factors for coronary artery disease include dyslipidemia, obesity and hypertension. Current diabetic treatment includes oral agent (dual therapy). She is compliant with treatment most of the time. An ACE inhibitor/angiotensin II receptor blocker is being taken. She sees a podiatrist.Eye exam is current.   COPD  She complains of shortness of breath. This is a chronic problem. The current episode started more than 1 year ago. The problem occurs intermittently. The problem has been waxing and waning. Associated symptoms include dyspnea on exertion, headaches and malaise/fatigue. Her symptoms are aggravated by change in weather and any activity. Her symptoms are alleviated by steroid inhaler. She reports moderate improvement on treatment. Her past medical history is significant for COPD.     Outpatient Medications Prior to Visit   Medication Sig Dispense Refill   • albuterol sulfate  (90 Base) MCG/ACT inhaler Inhale 2 puffs Every 4 (Four) Hours As Needed for Wheezing. 54 g 3   • amitriptyline (ELAVIL) 100 MG tablet Take 1 tablet by mouth every night at bedtime. 90 tablet 3   • aspirin 81 MG EC tablet Take 1 tablet by mouth Daily. 30 tablet 11   • atorvastatin (LIPITOR) 20 MG tablet Take 1 tablet by mouth Daily. 90 tablet 3   • BD Sharps Container Home misc Use daily to dispose needles 1 each 3   • calcium carbonate (OS-DIVYA) 600 MG tablet Take 1 tablet by mouth Daily.     • carvedilol (COREG) 3.125 MG tablet Take 1 tablet by mouth 2 (Two) Times a Day With Meals. 180 tablet 3   • cholecalciferol (VITAMIN  D3) 25 MCG (1000 UT) tablet Take 2 tablets by mouth Daily. 180 tablet 3   • Cobalamin Combinations (B-12) 100-5000 MCG sublingual tablet B12   1000 daily     • cyclobenzaprine (FLEXERIL) 10 MG tablet Take 1 tablet by mouth 2 (Two) Times a Day As Needed for Muscle Spasms. 180 tablet 3   • docusate sodium 100 MG capsule Take 1 capsule by mouth Daily. 90 each 0   • DULoxetine (CYMBALTA) 60 MG capsule Take 1 capsule by mouth Daily. 90 capsule 3   • Emgality 120 MG/ML solution prefilled syringe Inject 1 mL under the skin into the appropriate area as directed Every 30 (Thirty) Days. 3.36 mL 3   • empagliflozin (Jardiance) 10 MG tablet tablet Take 1 tablet by mouth Daily. 90 tablet 3   • esomeprazole (nexIUM) 20 MG capsule Take 1 capsule by mouth Every Morning Before Breakfast. 90 capsule 3   • ferrous sulfate 325 (65 FE) MG tablet Take 1 tablet by mouth Daily With Breakfast. 90 tablet 3   • Fetzima 40 MG capsule sustained-release 24 hr TAKE 1 CAPSULE EVERY DAY 30 capsule 4   • fluticasone (FLONASE) 50 MCG/ACT nasal spray 2 sprays into the nostril(s) as directed by provider Daily. 16 g 3   • FREESTYLE LITE test strip USE ONE STRIP TO CHECK BLOOD GLUCOSE TWICE DAILY     • furosemide (LASIX) 40 MG tablet Take 1 tablet by mouth Daily.     • glucose blood (IGlucose Test Strips) test strip Use as instructed 50 each 11   • hydrOXYzine (ATARAX) 25 MG tablet TAKE 1 TABLET EVERY DAY AS NEEDED FOR ANXIETY 30 tablet 0   • levothyroxine (SYNTHROID, LEVOTHROID) 175 MCG tablet Take 1 tablet by mouth Daily. 90 tablet 3   • Linzess 72 MCG capsule capsule Take 1 capsule by mouth Daily. 90 capsule 3   • loratadine (CLARITIN) 10 MG tablet Take 1 tablet by mouth Daily. 90 tablet 3   • magnesium gluconate (MAGONATE) 500 MG tablet magnesium 500 mg tablet   Take by oral route.     • meloxicam (Mobic) 15 MG tablet Take 1 tablet by mouth Daily. 30 tablet 3   • metFORMIN (GLUCOPHAGE) 1000 MG tablet TAKE 1 TABLET BY MOUTH TWICE DAILY WITH MEALS 60  "tablet 3   • naproxen (NAPROSYN) 500 MG tablet Take 1 tablet by mouth As Needed.     • nystatin (MYCOSTATIN) 017395 UNIT/GM cream APPLY ONE APPLICATION TOPICALLY TO  INCISION SITE TWICE DAILY FOR 30 DAYS     • pioglitazone (ACTOS) 15 MG tablet Take 1 tablet by mouth Daily. 90 tablet 3   • potassium chloride 10 MEQ CR tablet TAKE 1 TABLET BY MOUTH ONCE DAILY 30 tablet 2   • prochlorperazine (COMPAZINE) 10 MG tablet Take 1 tablet by mouth.     • Semaglutide, 2 MG/DOSE, (Ozempic, 2 MG/DOSE,) 8 MG/3ML solution pen-injector Inject 2 mg under the skin into the appropriate area as directed 1 (One) Time Per Week. 3 mL 11   • Symbicort 80-4.5 MCG/ACT inhaler Inhale 2 puffs As Needed.     • TRUEplus Lancets 28G misc Use up to 4 times daily to check blood sugar 100 each 11   • vitamin C (ASCORBIC ACID) 500 MG tablet TAKE 2 TABLETS EVERY DAY 60 tablet 2   • lisinopril-hydrochlorothiazide (PRINZIDE,ZESTORETIC) 20-12.5 MG per tablet Take 0.5 tablets by mouth Daily. 90 tablet 3   • pregabalin (LYRICA) 50 MG capsule TAKE 1 CAPSULE BY MOUTH THREE TIMES DAILY 90 capsule 0     No facility-administered medications prior to visit.       Review of Systems   Constitutional: Positive for fatigue, irritability and malaise/fatigue.   Respiratory: Positive for shortness of breath.    Cardiovascular: Positive for dyspnea on exertion.   Neurological: Positive for headaches.   Psychiatric/Behavioral: The patient is nervous/anxious.          Objective   Vital Signs:   Visit Vitals  /80 (BP Location: Right arm, Patient Position: Sitting, Cuff Size: Large Adult)   Pulse 105   Ht 182.9 cm (72.01\")   Wt (!) 157 kg (346 lb)   SpO2 97%   BMI 46.92 kg/m²     Physical Exam  Vitals and nursing note reviewed.   Constitutional:       Appearance: She is well-developed.   HENT:      Head: Normocephalic and atraumatic.   Eyes:      General: Lids are normal.      Conjunctiva/sclera: Conjunctivae normal.   Neck:      Thyroid: No thyroid mass or " thyromegaly.      Trachea: Trachea normal. No tracheal tenderness.   Cardiovascular:      Rate and Rhythm: Normal rate.      Pulses: Normal pulses.      Heart sounds: Normal heart sounds.   Pulmonary:      Effort: Pulmonary effort is normal. No respiratory distress.      Breath sounds: Normal breath sounds. No wheezing.   Abdominal:      General: There is no distension.      Palpations: Abdomen is soft. There is no mass.   Musculoskeletal:         General: Normal range of motion.      Cervical back: Normal range of motion. No edema.   Skin:     General: Skin is warm and dry.      Coloration: Skin is not pale.      Findings: No abrasion, erythema or lesion.   Neurological:      Mental Status: She is alert and oriented to person, place, and time.   Psychiatric:         Mood and Affect: Mood is not anxious. Affect is not inappropriate.         Speech: Speech normal.         Behavior: Behavior normal.         Thought Content: Thought content normal.         Judgment: Judgment normal. Judgment is not impulsive.        Result Review :                 Assessment and Plan    Diagnoses and all orders for this visit:    1. DM type 2 with diabetic peripheral neuropathy (Primary)  -     pregabalin (Lyrica) 75 MG capsule; Take 1 capsule by mouth 3 (Three) Times a Day.  Dispense: 90 capsule; Refill: 0  -     Hemoglobin A1c; Future    2. Essential hypertension  -     TSH; Future  -     Vitamin D,25-Hydroxy; Future  -     Comprehensive Metabolic Panel; Future  -     Hemoglobin A1c; Future  -     CBC & Differential; Future  -     Vitamin B12; Future  -     Lipid Panel; Future  -     lisinopril-hydrochlorothiazide (PRINZIDE,ZESTORETIC) 10-12.5 MG per tablet; Take 1 tablet by mouth Daily.  Dispense: 30 tablet; Refill: 11    3. Vitamin D deficiency  -     TSH; Future  -     Vitamin D,25-Hydroxy; Future  -     Comprehensive Metabolic Panel; Future  -     Hemoglobin A1c; Future  -     CBC & Differential; Future  -     Vitamin B12;  Future  -     Lipid Panel; Future    4. Mixed hyperlipidemia  -     TSH; Future  -     Vitamin D,25-Hydroxy; Future  -     Comprehensive Metabolic Panel; Future  -     Hemoglobin A1c; Future  -     CBC & Differential; Future  -     Vitamin B12; Future  -     Lipid Panel; Future    5. Acute pain of left shoulder  -     methylPREDNISolone (MEDROL) 4 MG dose pack; Take as directed on package instructions.  Dispense: 21 each; Refill: 0  -     Ambulatory Referral to Orthopedic Surgery      Complete ordered lab work   We will call with results  Pending lab results we will discuss further treatment plan and monitoring     We will lower lisinopril dose, please continue to monitor blood pressure at home and call the office if you have any issues    We will increase Lyrica to 75mg TID, please call the office if you have any issues         Follow Up   Return in about 3 months (around 9/2/2023), or if symptoms worsen or fail to improve, for Next scheduled follow up.  Patient was given instructions and counseling regarding her condition or for health maintenance advice. Please see specific information pulled into the AVS if appropriate.           This document has been electronically signed by INES Azevedo on June 2, 2023 09:30 CDT  Answers for HPI/ROS submitted by the patient on 5/26/2023  Please describe your symptoms.: Folowr up and labs for a1c  Have you had these symptoms before?: Yes  How long have you been having these symptoms?: Greater than 2 weeks  Please list any medications you are currently taking for this condition.: You have that list  What is the primary reason for your visit?: Other

## 2023-06-03 LAB
25(OH)D3 SERPL-MCNC: 27.6 NG/ML (ref 30–100)
ALBUMIN SERPL-MCNC: 4.5 G/DL (ref 3.5–5.2)
ALBUMIN/GLOB SERPL: 1.2 G/DL
ALP SERPL-CCNC: 117 U/L (ref 39–117)
ALT SERPL W P-5'-P-CCNC: 33 U/L (ref 1–33)
ANION GAP SERPL CALCULATED.3IONS-SCNC: 16 MMOL/L (ref 5–15)
AST SERPL-CCNC: 32 U/L (ref 1–32)
BASOPHILS # BLD AUTO: 0.06 10*3/MM3 (ref 0–0.2)
BASOPHILS NFR BLD AUTO: 0.5 % (ref 0–1.5)
BILIRUB SERPL-MCNC: 1.9 MG/DL (ref 0–1.2)
BUN SERPL-MCNC: 12 MG/DL (ref 6–20)
BUN/CREAT SERPL: 15.4 (ref 7–25)
CALCIUM SPEC-SCNC: 10.1 MG/DL (ref 8.6–10.5)
CHLORIDE SERPL-SCNC: 92 MMOL/L (ref 98–107)
CHOLEST SERPL-MCNC: 166 MG/DL (ref 0–200)
CO2 SERPL-SCNC: 29 MMOL/L (ref 22–29)
CREAT SERPL-MCNC: 0.78 MG/DL (ref 0.57–1)
DEPRECATED RDW RBC AUTO: 44.8 FL (ref 37–54)
EGFRCR SERPLBLD CKD-EPI 2021: 88.2 ML/MIN/1.73
EOSINOPHIL # BLD AUTO: 0.18 10*3/MM3 (ref 0–0.4)
EOSINOPHIL NFR BLD AUTO: 1.6 % (ref 0.3–6.2)
ERYTHROCYTE [DISTWIDTH] IN BLOOD BY AUTOMATED COUNT: 15.6 % (ref 12.3–15.4)
GLOBULIN UR ELPH-MCNC: 3.9 GM/DL
GLUCOSE SERPL-MCNC: 112 MG/DL (ref 65–99)
HBA1C MFR BLD: 6.2 % (ref 4.8–5.6)
HCT VFR BLD AUTO: 49.2 % (ref 34–46.6)
HDLC SERPL-MCNC: 34 MG/DL (ref 40–60)
HGB BLD-MCNC: 16.4 G/DL (ref 12–15.9)
IMM GRANULOCYTES # BLD AUTO: 0.04 10*3/MM3 (ref 0–0.05)
IMM GRANULOCYTES NFR BLD AUTO: 0.3 % (ref 0–0.5)
LDLC SERPL CALC-MCNC: 105 MG/DL (ref 0–100)
LDLC/HDLC SERPL: 2.98 {RATIO}
LYMPHOCYTES # BLD AUTO: 4.12 10*3/MM3 (ref 0.7–3.1)
LYMPHOCYTES NFR BLD AUTO: 35.6 % (ref 19.6–45.3)
MCH RBC QN AUTO: 27.4 PG (ref 26.6–33)
MCHC RBC AUTO-ENTMCNC: 33.3 G/DL (ref 31.5–35.7)
MCV RBC AUTO: 82.3 FL (ref 79–97)
MONOCYTES # BLD AUTO: 0.88 10*3/MM3 (ref 0.1–0.9)
MONOCYTES NFR BLD AUTO: 7.6 % (ref 5–12)
NEUTROPHILS NFR BLD AUTO: 54.4 % (ref 42.7–76)
NEUTROPHILS NFR BLD AUTO: 6.28 10*3/MM3 (ref 1.7–7)
NRBC BLD AUTO-RTO: 0 /100 WBC (ref 0–0.2)
PLATELET # BLD AUTO: 489 10*3/MM3 (ref 140–450)
PMV BLD AUTO: 11.2 FL (ref 6–12)
POTASSIUM SERPL-SCNC: 3.2 MMOL/L (ref 3.5–5.2)
PROT SERPL-MCNC: 8.4 G/DL (ref 6–8.5)
RBC # BLD AUTO: 5.98 10*6/MM3 (ref 3.77–5.28)
SODIUM SERPL-SCNC: 137 MMOL/L (ref 136–145)
TRIGL SERPL-MCNC: 154 MG/DL (ref 0–150)
TSH SERPL DL<=0.05 MIU/L-ACNC: 0.98 UIU/ML (ref 0.27–4.2)
VIT B12 BLD-MCNC: 778 PG/ML (ref 211–946)
VLDLC SERPL-MCNC: 27 MG/DL (ref 5–40)
WBC NRBC COR # BLD: 11.56 10*3/MM3 (ref 3.4–10.8)

## 2023-06-15 DIAGNOSIS — E87.6 HYPOKALEMIA: Primary | ICD-10-CM

## 2023-06-15 DIAGNOSIS — R79.89 ABNORMAL CBC: Primary | ICD-10-CM

## 2023-06-16 ENCOUNTER — LAB (OUTPATIENT)
Dept: LAB | Facility: HOSPITAL | Age: 58
End: 2023-06-16
Payer: MEDICARE

## 2023-06-16 DIAGNOSIS — E87.6 HYPOKALEMIA: ICD-10-CM

## 2023-06-16 LAB
ALBUMIN SERPL-MCNC: 3.9 G/DL (ref 3.5–5.2)
ALBUMIN/GLOB SERPL: 1.1 G/DL
ALP SERPL-CCNC: 119 U/L (ref 39–117)
ALT SERPL W P-5'-P-CCNC: 23 U/L (ref 1–33)
ANION GAP SERPL CALCULATED.3IONS-SCNC: 12 MMOL/L (ref 5–15)
AST SERPL-CCNC: 20 U/L (ref 1–32)
BILIRUB SERPL-MCNC: 1 MG/DL (ref 0–1.2)
BUN SERPL-MCNC: 13 MG/DL (ref 6–20)
BUN/CREAT SERPL: 18.8 (ref 7–25)
CALCIUM SPEC-SCNC: 9.6 MG/DL (ref 8.6–10.5)
CHLORIDE SERPL-SCNC: 93 MMOL/L (ref 98–107)
CO2 SERPL-SCNC: 28 MMOL/L (ref 22–29)
CREAT SERPL-MCNC: 0.69 MG/DL (ref 0.57–1)
EGFRCR SERPLBLD CKD-EPI 2021: 100.7 ML/MIN/1.73
GLOBULIN UR ELPH-MCNC: 3.6 GM/DL
GLUCOSE SERPL-MCNC: 179 MG/DL (ref 65–99)
POTASSIUM SERPL-SCNC: 3.8 MMOL/L (ref 3.5–5.2)
PROT SERPL-MCNC: 7.5 G/DL (ref 6–8.5)
SODIUM SERPL-SCNC: 133 MMOL/L (ref 136–145)

## 2023-06-16 PROCEDURE — 80053 COMPREHEN METABOLIC PANEL: CPT

## 2023-06-16 PROCEDURE — 36415 COLL VENOUS BLD VENIPUNCTURE: CPT

## 2023-06-30 PROBLEM — D72.828 OTHER ELEVATED WHITE BLOOD CELL COUNT: Status: ACTIVE | Noted: 2023-06-30

## 2023-07-12 PROBLEM — D72.828 OTHER ELEVATED WHITE BLOOD CELL COUNT: Chronic | Status: ACTIVE | Noted: 2023-06-30

## 2023-07-12 PROBLEM — D75.839 THROMBOCYTOSIS: Chronic | Status: ACTIVE | Noted: 2023-07-12

## 2023-07-12 PROBLEM — D75.1 ERYTHROCYTOSIS: Status: ACTIVE | Noted: 2023-07-12

## 2023-07-12 PROBLEM — D75.839 THROMBOCYTOSIS: Status: ACTIVE | Noted: 2023-07-12

## 2023-07-12 PROBLEM — E61.1 IRON DEFICIENCY: Chronic | Status: ACTIVE | Noted: 2023-07-12

## 2023-07-12 PROBLEM — E61.1 IRON DEFICIENCY: Status: ACTIVE | Noted: 2023-07-12

## 2023-07-12 PROBLEM — D75.1 ERYTHROCYTOSIS: Chronic | Status: ACTIVE | Noted: 2023-07-12

## 2023-07-31 ENCOUNTER — OFFICE VISIT (OUTPATIENT)
Dept: ORTHOPEDIC SURGERY | Facility: CLINIC | Age: 58
End: 2023-07-31
Payer: MEDICARE

## 2023-07-31 VITALS — BODY MASS INDEX: 39.68 KG/M2 | WEIGHT: 293 LBS | HEIGHT: 72 IN

## 2023-07-31 DIAGNOSIS — M25.512 CHRONIC LEFT SHOULDER PAIN: Primary | ICD-10-CM

## 2023-07-31 DIAGNOSIS — G89.29 CHRONIC LEFT SHOULDER PAIN: Primary | ICD-10-CM

## 2023-07-31 DIAGNOSIS — M24.812 INTERNAL DERANGEMENT OF LEFT SHOULDER: ICD-10-CM

## 2023-07-31 PROCEDURE — 99214 OFFICE O/P EST MOD 30 MIN: CPT | Performed by: NURSE PRACTITIONER

## 2023-07-31 RX ORDER — MELOXICAM 15 MG/1
15 TABLET ORAL DAILY
Qty: 30 TABLET | Refills: 0 | Status: SHIPPED | OUTPATIENT
Start: 2023-07-31 | End: 2023-08-30

## 2023-08-02 RX ORDER — DIAZEPAM 10 MG/1
TABLET ORAL
Qty: 1 TABLET | Refills: 0 | Status: SHIPPED | OUTPATIENT
Start: 2023-08-02

## 2023-08-07 DIAGNOSIS — E11.42 DM TYPE 2 WITH DIABETIC PERIPHERAL NEUROPATHY: ICD-10-CM

## 2023-08-07 RX ORDER — PREGABALIN 75 MG/1
CAPSULE ORAL
Qty: 90 CAPSULE | Refills: 0 | Status: SHIPPED | OUTPATIENT
Start: 2023-08-07

## 2023-08-15 DIAGNOSIS — I10 ESSENTIAL HYPERTENSION: ICD-10-CM

## 2023-08-15 RX ORDER — LISINOPRIL AND HYDROCHLOROTHIAZIDE 12.5; 1 MG/1; MG/1
1 TABLET ORAL DAILY
Qty: 90 TABLET | Refills: 3 | Status: SHIPPED | OUTPATIENT
Start: 2023-08-15

## 2023-08-15 NOTE — TELEPHONE ENCOUNTER
Incoming Refill Request      Medication requested (name and dose):     Pharmacy where request should be sent:     Additional details provided by patient:     Best call back number:     Does the patient have less than a 3 day supply:  [] Yes  [] No    Socorro Lopez MA  08/15/23, 08:45 CDT

## 2023-08-16 ENCOUNTER — HOSPITAL ENCOUNTER (OUTPATIENT)
Dept: MRI IMAGING | Facility: HOSPITAL | Age: 58
Discharge: HOME | End: 2023-08-16
Payer: MEDICARE

## 2023-08-16 DIAGNOSIS — G89.29 CHRONIC LEFT SHOULDER PAIN: ICD-10-CM

## 2023-08-16 DIAGNOSIS — M25.512 CHRONIC LEFT SHOULDER PAIN: ICD-10-CM

## 2023-08-16 DIAGNOSIS — M24.812 INTERNAL DERANGEMENT OF LEFT SHOULDER: ICD-10-CM

## 2023-08-17 RX ORDER — DIAZEPAM 10 MG/1
TABLET ORAL
Qty: 1 TABLET | Refills: 0 | Status: SHIPPED | OUTPATIENT
Start: 2023-08-17

## 2023-08-17 RX ORDER — ASCORBIC ACID 500 MG
TABLET ORAL
Qty: 60 TABLET | Refills: 1 | Status: SHIPPED | OUTPATIENT
Start: 2023-08-17

## 2023-08-17 RX ORDER — LEVOMILNACIPRAN HYDROCHLORIDE 40 MG/1
CAPSULE, EXTENDED RELEASE ORAL
Qty: 30 CAPSULE | Refills: 3 | Status: SHIPPED | OUTPATIENT
Start: 2023-08-17

## 2023-08-17 NOTE — TELEPHONE ENCOUNTER
----- Message from Janette Yadav sent at 8/16/2023  7:11 PM CDT -----  Regarding: Mri  Contact: [phone]  I tried do mri on Wed 1 I didn't fit in machine they said refer over to [facility] or [facility]ri suppose have a bigger e r machine and also scudule that for me and also can you send a value for that appointment  I took 1 for today plse thanks u Janette yadav

## 2023-08-21 ENCOUNTER — OFFICE VISIT (OUTPATIENT)
Dept: ENDOCRINOLOGY | Facility: CLINIC | Age: 58
End: 2023-08-21
Payer: MEDICARE

## 2023-08-21 VITALS
WEIGHT: 293 LBS | DIASTOLIC BLOOD PRESSURE: 86 MMHG | SYSTOLIC BLOOD PRESSURE: 148 MMHG | OXYGEN SATURATION: 98 % | BODY MASS INDEX: 39.68 KG/M2 | HEIGHT: 72 IN | HEART RATE: 100 BPM

## 2023-08-21 DIAGNOSIS — E78.2 MIXED HYPERLIPIDEMIA: ICD-10-CM

## 2023-08-21 DIAGNOSIS — I10 ESSENTIAL HYPERTENSION: ICD-10-CM

## 2023-08-21 DIAGNOSIS — E55.9 VITAMIN D DEFICIENCY: ICD-10-CM

## 2023-08-21 DIAGNOSIS — E11.42 TYPE 2 DIABETES MELLITUS WITH DIABETIC POLYNEUROPATHY, WITHOUT LONG-TERM CURRENT USE OF INSULIN: Primary | ICD-10-CM

## 2023-08-21 PROCEDURE — 3077F SYST BP >= 140 MM HG: CPT | Performed by: NURSE PRACTITIONER

## 2023-08-21 PROCEDURE — 3044F HG A1C LEVEL LT 7.0%: CPT | Performed by: NURSE PRACTITIONER

## 2023-08-21 PROCEDURE — 3079F DIAST BP 80-89 MM HG: CPT | Performed by: NURSE PRACTITIONER

## 2023-08-21 PROCEDURE — 99214 OFFICE O/P EST MOD 30 MIN: CPT | Performed by: NURSE PRACTITIONER

## 2023-08-21 NOTE — PROGRESS NOTES
"Chief Complaint  Diabetes (Fingerstick/)    Subjective          Janette Chadwick presents to [facility] ENDOCRINOLOGY  Diabetes         In office visit     Referring provider INES Azevedo       Chief Complaint   Patient presents with    Diabetes     Fingerstick         HPI      57-year-old female presents for follow-up    Reason diabetes mellitus type 2    Duration since 2002    Timing constant    Quality great control    Severity is high    Complications neuropathy        Current diabetes regimen     GLP-1 , jardiance , metformin         Current glucose monitoring     fingerstick     Once daily           Hypothyroidism     Duration  -- diagnosed 30 years     Timing constant     quality - controlled    Review of Systems - General ROS: negative                Objective   Vital Signs:   /86   Pulse 100   Ht 182.9 cm (72\")   Wt (!) 158 kg (348 lb)   SpO2 98%   BMI 47.20 kg/mý     Physical Exam  Constitutional:       Appearance: Normal appearance.   Cardiovascular:      Rate and Rhythm: Regular rhythm.      Heart sounds: Normal heart sounds.   Musculoskeletal:         General: Normal range of motion.      Cervical back: Normal range of motion.   Neurological:      Mental Status: She is alert.      Result Review :   The following data was reviewed by: INES Marino on 01/25/2023:  Common labs          6/2/2023    09:27 6/16/2023    13:12 6/30/2023    15:58   Common Labs   Glucose 112  179  114    BUN 12  13  9    Creatinine 0.78  0.69  0.75    Sodium 137  133  136    Potassium 3.2  3.8  3.9    Chloride 92  93  98    Calcium 10.1  9.6  10.0    Albumin 4.5  3.9     Total Bilirubin 1.9  1.0     Alkaline Phosphatase 117  119     AST (SGOT) 32  20     ALT (SGPT) 33  23     WBC 11.56   10.73    Hemoglobin 16.4   14.9    Hematocrit 49.2   44.7    Platelets 489   394    Total Cholesterol 166      Triglycerides 154      HDL Cholesterol 34      LDL Cholesterol  105    "   Hemoglobin A1C 6.20                  Assessment and Plan    Diagnoses and all orders for this visit:    1. Type 2 diabetes mellitus with diabetic polyneuropathy, without long-term current use of insulin (Primary)  -     CBC & Differential  -     Comprehensive Metabolic Panel  -     Hemoglobin A1c  -     Lipid Panel  -     Microalbumin / Creatinine Urine Ratio - Urine, Clean Catch  -     TSH  -     Vitamin B12  -     Vitamin D,25-Hydroxy    2. Mixed hyperlipidemia  -     CBC & Differential  -     Comprehensive Metabolic Panel  -     Hemoglobin A1c  -     Lipid Panel  -     Microalbumin / Creatinine Urine Ratio - Urine, Clean Catch  -     TSH  -     Vitamin B12  -     Vitamin D,25-Hydroxy    3. Essential hypertension  -     CBC & Differential  -     Comprehensive Metabolic Panel  -     Hemoglobin A1c  -     Lipid Panel  -     Microalbumin / Creatinine Urine Ratio - Urine, Clean Catch  -     TSH  -     Vitamin B12  -     Vitamin D,25-Hydroxy    4. Vitamin D deficiency  -     CBC & Differential  -     Comprehensive Metabolic Panel  -     Hemoglobin A1c  -     Lipid Panel  -     Microalbumin / Creatinine Urine Ratio - Urine, Clean Catch  -     TSH  -     Vitamin B12  -     Vitamin D,25-Hydroxy               Glycemic Management:    Diabetes mellitus type 2    Lab Results   Component Value Date    HGBA1C 6.20 (H) 06/02/2023       Taking ozempic 2 mg weekly     Taking jardiance 10 mg daily -      Taking actos 15 mg daily     Taking metformin 1000 mg bid       Goals for sugar    Fasting and before meals 80 to 130    2 hours after meals 180 or less      Aim for 45 grams of carbohydrate per meal    Aim for 15 grams of carbohydrate per snack             Microvascular Complications Monitoring       Last eye exam---  March 2023, no DR     Neuropathy --yes    Taking Lyrica 50 mg TID             No renal disease         Lipid Management:       Taking lipitor 20 mg one at night         Total Cholesterol   Date Value Ref Range  Status   06/02/2023 166 0 - 200 mg/dL Final     Triglycerides   Date Value Ref Range Status   06/02/2023 154 (H) 0 - 150 mg/dL Final   08/17/2020 181 (H) 0 - 149 mg/dL Final     HDL Cholesterol   Date Value Ref Range Status   06/02/2023 34 (L) 40 - 60 mg/dL Final   08/17/2020 38 (L) 65 - 121 mg/dL Final     Comment:     VALUES>60 MG/DL ARE ASSOCIATED WITH A DECREASED RISK OF  ATHEROSCLEROTIC CARDIOVASCULAR DISEASE     LDL Cholesterol    Date Value Ref Range Status   06/02/2023 105 (H) 0 - 100 mg/dL Final   08/17/2020 80 <100 mg/dL Final     Comment:     <100 MG/DL=OPITIMAL    100-129 MG/DL=DESIRABLE    130-159 MG/DL BORDERLINE=INCREASED RISK OF ATHEROSCLEROTIC  CARDIOVASCULAR DISEASE    > OR = 160 MG/DL=ASSOCIATED WITH AN INCREASE RISK OF  ATHEROSCLEROTIC CARDIOVASCULAR DISEASE         Blood Pressure Management:      Taking lisinopril-hctz 20-12.5 mg daily --- take 1/2 tablet daily       Thyroid Health      Lab Results   Component Value Date    TSH 0.984 06/02/2023       Taking levothyroxine 175 mcg daily          Bone Health       Vitamin d def    Taking supplement       Preventive Care:     Non-smoker                Follow Up   Return in about 3 months (around 11/21/2023) for Recheck.  Patient was given instructions and counseling regarding her condition or for health maintenance advice. Please see specific information pulled into the AVS if appropriate.         This document has been electronically signed by INES Marino on August 21, 2023 08:40 CDT.

## 2023-08-25 ENCOUNTER — OFFICE VISIT (OUTPATIENT)
Dept: PODIATRY | Facility: CLINIC | Age: 58
End: 2023-08-25
Payer: MEDICARE

## 2023-08-25 VITALS
OXYGEN SATURATION: 99 % | BODY MASS INDEX: 67.81 KG/M2 | SYSTOLIC BLOOD PRESSURE: 117 MMHG | DIASTOLIC BLOOD PRESSURE: 81 MMHG | HEIGHT: 55 IN | WEIGHT: 293 LBS | HEART RATE: 95 BPM

## 2023-08-25 DIAGNOSIS — E11.8 DIABETIC FOOT: ICD-10-CM

## 2023-08-25 DIAGNOSIS — E11.65 TYPE 2 DIABETES MELLITUS WITH HYPERGLYCEMIA, UNSPECIFIED WHETHER LONG TERM INSULIN USE: ICD-10-CM

## 2023-08-25 DIAGNOSIS — B35.1 ONYCHOMYCOSIS: Primary | ICD-10-CM

## 2023-08-25 DIAGNOSIS — M20.42 HAMMER TOES OF BOTH FEET: ICD-10-CM

## 2023-08-25 DIAGNOSIS — M20.41 HAMMER TOES OF BOTH FEET: ICD-10-CM

## 2023-08-25 PROCEDURE — 3074F SYST BP LT 130 MM HG: CPT | Performed by: NURSE PRACTITIONER

## 2023-08-25 PROCEDURE — 99213 OFFICE O/P EST LOW 20 MIN: CPT | Performed by: NURSE PRACTITIONER

## 2023-08-25 PROCEDURE — 3079F DIAST BP 80-89 MM HG: CPT | Performed by: NURSE PRACTITIONER

## 2023-08-25 NOTE — PROGRESS NOTES
Janette hCadwick  1965  57 y.o. female   PCP: Nata Morgan 03/02/2023  A1c:  6.2 per patient     Reviewed prior exam notes/labs/tracings    08/25/2023    Chief Complaint   Patient presents with    Left Foot - Follow-up    Right Foot - Follow-up       History of Present Illness    Janette Chadwick is a 57 y.o.female presents to the clinic today for a diabetic foot care.  Past Medical History:   Diagnosis Date    Arthritis     Asthma     Carpal tunnel syndrome 2020    CHF (congestive heart failure)     COPD (chronic obstructive pulmonary disease)     Diabetes mellitus     Disease of thyroid gland     Elevated cholesterol     GERD (gastroesophageal reflux disease)     Hyperlipidemia     Hypertension     Sleep apnea          Past Surgical History:   Procedure Laterality Date    CHOLECYSTECTOMY      CHOLECYSTECTOMY      FRACTURE SURGERY      HYSTERECTOMY      JOINT REPLACEMENT      KIDNEY STONE SURGERY      TIBIA FRACTURE SURGERY      TUBAL ABDOMINAL LIGATION           History reviewed. No pertinent family history.    Allergies   Allergen Reactions    Adhesive Tape Rash    Other Rash    Penicillins Rash, Hives and Itching       Social History     Socioeconomic History    Marital status:    Tobacco Use    Smoking status: Never     Passive exposure: Past    Smokeless tobacco: Never   Vaping Use    Vaping Use: Never used   Substance and Sexual Activity    Alcohol use: No    Drug use: No    Sexual activity: Defer         Current Outpatient Medications   Medication Sig Dispense Refill    albuterol sulfate  (90 Base) MCG/ACT inhaler Inhale 2 puffs Every 4 (Four) Hours As Needed for Wheezing. 54 g 3    amitriptyline (ELAVIL) 100 MG tablet Take 1 tablet by mouth every night at bedtime. 90 tablet 3    aspirin 81 MG EC tablet Take 1 tablet by mouth Daily. 30 tablet 11    atorvastatin (LIPITOR) 20 MG tablet Take 1 tablet by mouth Daily. 90 tablet 3    calcium carbonate (OS-DIVYA) 600 MG tablet Take 1 tablet by  mouth Daily.      carvedilol (COREG) 3.125 MG tablet Take 1 tablet by mouth 2 (Two) Times a Day With Meals. 180 tablet 3    cholecalciferol (VITAMIN D3) 25 MCG (1000 UT) tablet Take 2 tablets by mouth Daily. 180 tablet 3    Cobalamin Combinations (B-12) 100-5000 MCG sublingual tablet B12   1000 daily      cyclobenzaprine (FLEXERIL) 10 MG tablet Take 1 tablet by mouth 2 (Two) Times a Day As Needed for Muscle Spasms. 180 tablet 3    diazePAM (Valium) 10 MG tablet Take 1 tablet 30 min prior to MRI 1 tablet 0    docusate sodium 100 MG capsule Take 1 capsule by mouth Daily. 90 each 0    DULoxetine (CYMBALTA) 60 MG capsule Take 1 capsule by mouth Daily. 90 capsule 3    Emgality 120 MG/ML solution prefilled syringe Inject 1 mL under the skin into the appropriate area as directed Every 30 (Thirty) Days. 3.36 mL 3    empagliflozin (Jardiance) 10 MG tablet tablet Take 1 tablet by mouth Daily. 90 tablet 3    esomeprazole (nexIUM) 20 MG capsule Take 1 capsule by mouth Every Morning Before Breakfast. 90 capsule 3    ferrous sulfate 325 (65 FE) MG tablet Take 1 tablet by mouth Daily With Breakfast. 90 tablet 3    Fetzima 40 MG capsule sustained-release 24 hr TAKE 1 CAPSULE BY MOUTH EVERY DAY 30 capsule 3    fluticasone (FLONASE) 50 MCG/ACT nasal spray 2 sprays into the nostril(s) as directed by provider Daily. 16 g 3    FREESTYLE LITE test strip USE ONE STRIP TO CHECK BLOOD GLUCOSE TWICE DAILY      furosemide (LASIX) 40 MG tablet TAKE 1 TABLET BY MOUTH EVERY DAY 30 tablet 7    glucose blood (IGlucose Test Strips) test strip Use as instructed 50 each 11    hydrOXYzine (ATARAX) 25 MG tablet TAKE 1 TABLET EVERY DAY AS NEEDED FOR ANXIETY 30 tablet 0    Incontinence Supply Disposable ([facility] Incontinence Pads) misc 4 each Daily. 150 each 11    Incontinence Supply Disposable (Underpads) misc 3 each Daily. 100 each 11    levothyroxine (SYNTHROID, LEVOTHROID) 175 MCG tablet Take 1 tablet by mouth Daily. 90 tablet 3    Linzess 72 MCG  capsule capsule TAKE 1 CAPSULE BY MOUTH EVERY DAY 30 capsule 5    lisinopril-hydrochlorothiazide (PRINZIDE,ZESTORETIC) 10-12.5 MG per tablet Take 1 tablet by mouth Daily. 90 tablet 3    loratadine (CLARITIN) 10 MG tablet Take 1 tablet by mouth Daily. 90 tablet 3    magnesium gluconate (MAGONATE) 500 MG tablet magnesium 500 mg tablet   Take by oral route.      meloxicam (Mobic) 15 MG tablet Take 1 tablet by mouth Daily for 30 days. 30 tablet 0    metFORMIN (GLUCOPHAGE) 1000 MG tablet TAKE 1 TABLET BY MOUTH TWICE DAILY WITH MEALS 60 tablet 3    nystatin (MYCOSTATIN) 995409 UNIT/GM cream APPLY ONE APPLICATION TOPICALLY TO  INCISION SITE TWICE DAILY FOR 30 DAYS      pioglitazone (ACTOS) 15 MG tablet Take 1 tablet by mouth Daily. 90 tablet 3    potassium chloride 10 MEQ CR tablet TAKE 1 TABLET BY MOUTH EVERY DAY 30 tablet 1    pregabalin (LYRICA) 75 MG capsule TAKE 1 CAPSULE THREE TIMES DAILY 90 capsule 0    prochlorperazine (COMPAZINE) 10 MG tablet Take 1 tablet by mouth.      Semaglutide, 2 MG/DOSE, (Ozempic, 2 MG/DOSE,) 8 MG/3ML solution pen-injector Inject 2 mg under the skin into the appropriate area as directed 1 (One) Time Per Week. 3 mL 11    Symbicort 80-4.5 MCG/ACT inhaler Inhale 2 puffs As Needed.      TRUEplus Lancets 28G misc Use up to 4 times daily to check blood sugar 100 each 11    vitamin C (ASCORBIC ACID) 500 MG tablet TAKE 2 TABLETS EVERY DAY 60 tablet 1     No current facility-administered medications for this visit.       Review of Systems   Constitutional: Negative.    HENT: Negative.     Eyes: Negative.    Respiratory: Negative.     Cardiovascular: Negative.    Gastrointestinal: Negative.    Endocrine: Negative.    Genitourinary: Negative.    Musculoskeletal:         Foot pain   Skin: Negative.    Allergic/Immunologic: Negative.    Neurological: Negative.    Hematological: Negative.    Psychiatric/Behavioral: Negative.     All other systems reviewed and are negative.      OBJECTIVE    BP  "117/81   Pulse 95   Ht 72 cm (28.35\")   Wt (!) 158 kg (348 lb)   SpO2 99%   .51 kg/mý     Physical Exam  Vitals reviewed.   Constitutional:       Appearance: Normal appearance. She is well-developed.   HENT:      Head: Normocephalic and atraumatic.   Neck:      Trachea: Trachea and phonation normal.   Cardiovascular:      Pulses:           Dorsalis pedis pulses are detected w/ Doppler on the right side and detected w/ Doppler on the left side.        Posterior tibial pulses are detected w/ Doppler on the right side and detected w/ Doppler on the left side.   Pulmonary:      Effort: Pulmonary effort is normal. No respiratory distress.   Abdominal:      General: There is no distension.      Palpations: Abdomen is soft.   Musculoskeletal:      Right foot: Normal range of motion.      Left foot: Normal range of motion.   Feet:      Right foot:      Protective Sensation: 10 sites tested.  10 sites sensed.      Skin integrity: Skin integrity normal.      Toenail Condition: Right toenails are long.      Left foot:      Protective Sensation: 10 sites tested.  10 sites sensed.      Skin integrity: Skin integrity normal.      Toenail Condition: Left toenails are long.   Skin:     General: Skin is warm and dry.   Neurological:      Mental Status: She is alert and oriented to person, place, and time.      GCS: GCS eye subscore is 4. GCS verbal subscore is 5. GCS motor subscore is 6.   Psychiatric:         Speech: Speech normal.         Behavior: Behavior normal. Behavior is cooperative.         Thought Content: Thought content normal.         Judgment: Judgment normal.              Procedures        ASSESSMENT AND PLAN    Diagnoses and all orders for this visit:    1. Onychomycosis (Primary)    2. Type 2 diabetes mellitus with hyperglycemia, unspecified whether long term insulin use    3. Diabetic foot    4. Hammer toes of both feet         - A diabetic foot screening exam was performed and the patient was educated " on the foot complications related to diabetes,  preventative foot care and what signs and symptoms to watch for.  Instructed to contact our office if any foot problems develop before next visit.  -Discussed treatments for  painful toenails. Treatment options discussed included nail removal versus debridement. Patient elected for routine nail debridement. An ABN has been signed by the patient.  - Toenails 1-5 bilateral were debrided in length and thickness with nail nipper and electric  to decrease fungal load and risk of infection.  - All the patients questions were answered.  - RTC 3 months or sooner if needed.           This document has been electronically signed by Arsh CHACON, FNP-C, ONP-C on August 25, 2023 10:14 CDT

## 2023-09-05 ENCOUNTER — OFFICE VISIT (OUTPATIENT)
Dept: FAMILY MEDICINE CLINIC | Facility: CLINIC | Age: 58
End: 2023-09-05
Payer: MEDICARE

## 2023-09-05 ENCOUNTER — LAB (OUTPATIENT)
Dept: LAB | Facility: HOSPITAL | Age: 58
End: 2023-09-05
Payer: MEDICARE

## 2023-09-05 VITALS
SYSTOLIC BLOOD PRESSURE: 98 MMHG | WEIGHT: 293 LBS | OXYGEN SATURATION: 98 % | BODY MASS INDEX: 67.81 KG/M2 | DIASTOLIC BLOOD PRESSURE: 76 MMHG | HEART RATE: 100 BPM | HEIGHT: 55 IN

## 2023-09-05 DIAGNOSIS — E78.2 MIXED HYPERLIPIDEMIA: ICD-10-CM

## 2023-09-05 DIAGNOSIS — I50.9 CONGESTIVE HEART FAILURE, UNSPECIFIED HF CHRONICITY, UNSPECIFIED HEART FAILURE TYPE: ICD-10-CM

## 2023-09-05 DIAGNOSIS — E11.42 DM TYPE 2 WITH DIABETIC PERIPHERAL NEUROPATHY: Primary | ICD-10-CM

## 2023-09-05 DIAGNOSIS — I10 ESSENTIAL HYPERTENSION: ICD-10-CM

## 2023-09-05 DIAGNOSIS — Z23 NEED FOR HEPATITIS B VACCINATION: ICD-10-CM

## 2023-09-05 LAB
25(OH)D3 SERPL-MCNC: 22.2 NG/ML (ref 30–100)
ALBUMIN SERPL-MCNC: 4.1 G/DL (ref 3.5–5.2)
ALBUMIN UR-MCNC: <1.2 MG/DL
ALBUMIN/GLOB SERPL: 1 G/DL
ALP SERPL-CCNC: 138 U/L (ref 39–117)
ALT SERPL W P-5'-P-CCNC: 24 U/L (ref 1–33)
ANION GAP SERPL CALCULATED.3IONS-SCNC: 9 MMOL/L (ref 5–15)
AST SERPL-CCNC: 21 U/L (ref 1–32)
BASOPHILS # BLD AUTO: 0.06 10*3/MM3 (ref 0–0.2)
BASOPHILS NFR BLD AUTO: 0.6 % (ref 0–1.5)
BILIRUB SERPL-MCNC: 1.3 MG/DL (ref 0–1.2)
BUN SERPL-MCNC: 12 MG/DL (ref 6–20)
BUN/CREAT SERPL: 15.6 (ref 7–25)
CALCIUM SPEC-SCNC: 10.1 MG/DL (ref 8.6–10.5)
CHLORIDE SERPL-SCNC: 94 MMOL/L (ref 98–107)
CHOLEST SERPL-MCNC: 165 MG/DL (ref 0–200)
CO2 SERPL-SCNC: 35 MMOL/L (ref 22–29)
CREAT SERPL-MCNC: 0.77 MG/DL (ref 0.57–1)
CREAT UR-MCNC: 130.8 MG/DL
DEPRECATED RDW RBC AUTO: 49.1 FL (ref 37–54)
EGFRCR SERPLBLD CKD-EPI 2021: 89.5 ML/MIN/1.73
EOSINOPHIL # BLD AUTO: 0.17 10*3/MM3 (ref 0–0.4)
EOSINOPHIL NFR BLD AUTO: 1.7 % (ref 0.3–6.2)
ERYTHROCYTE [DISTWIDTH] IN BLOOD BY AUTOMATED COUNT: 15.1 % (ref 12.3–15.4)
GLOBULIN UR ELPH-MCNC: 4.3 GM/DL
GLUCOSE SERPL-MCNC: 113 MG/DL (ref 65–99)
HBA1C MFR BLD: 5.9 % (ref 4.8–5.6)
HCT VFR BLD AUTO: 48.2 % (ref 34–46.6)
HDLC SERPL-MCNC: 39 MG/DL (ref 40–60)
HGB BLD-MCNC: 15.5 G/DL (ref 12–15.9)
IMM GRANULOCYTES # BLD AUTO: 0.03 10*3/MM3 (ref 0–0.05)
IMM GRANULOCYTES NFR BLD AUTO: 0.3 % (ref 0–0.5)
LDLC SERPL CALC-MCNC: 100 MG/DL (ref 0–100)
LDLC/HDLC SERPL: 2.49 {RATIO}
LYMPHOCYTES # BLD AUTO: 2.82 10*3/MM3 (ref 0.7–3.1)
LYMPHOCYTES NFR BLD AUTO: 28.9 % (ref 19.6–45.3)
MCH RBC QN AUTO: 28.1 PG (ref 26.6–33)
MCHC RBC AUTO-ENTMCNC: 32.2 G/DL (ref 31.5–35.7)
MCV RBC AUTO: 87.5 FL (ref 79–97)
MICROALBUMIN/CREAT UR: NORMAL MG/G{CREAT}
MONOCYTES # BLD AUTO: 0.65 10*3/MM3 (ref 0.1–0.9)
MONOCYTES NFR BLD AUTO: 6.7 % (ref 5–12)
NEUTROPHILS NFR BLD AUTO: 6.03 10*3/MM3 (ref 1.7–7)
NEUTROPHILS NFR BLD AUTO: 61.8 % (ref 42.7–76)
NRBC BLD AUTO-RTO: 0 /100 WBC (ref 0–0.2)
PLATELET # BLD AUTO: 391 10*3/MM3 (ref 140–450)
PMV BLD AUTO: 10.3 FL (ref 6–12)
POTASSIUM SERPL-SCNC: 3.4 MMOL/L (ref 3.5–5.2)
PROT SERPL-MCNC: 8.4 G/DL (ref 6–8.5)
RBC # BLD AUTO: 5.51 10*6/MM3 (ref 3.77–5.28)
SODIUM SERPL-SCNC: 138 MMOL/L (ref 136–145)
TRIGL SERPL-MCNC: 144 MG/DL (ref 0–150)
TSH SERPL DL<=0.05 MIU/L-ACNC: 0.2 UIU/ML (ref 0.27–4.2)
VIT B12 BLD-MCNC: 778 PG/ML (ref 211–946)
VLDLC SERPL-MCNC: 26 MG/DL (ref 5–40)
WBC NRBC COR # BLD: 9.76 10*3/MM3 (ref 3.4–10.8)

## 2023-09-05 PROCEDURE — 82607 VITAMIN B-12: CPT | Performed by: NURSE PRACTITIONER

## 2023-09-05 PROCEDURE — 99214 OFFICE O/P EST MOD 30 MIN: CPT | Performed by: NURSE PRACTITIONER

## 2023-09-05 PROCEDURE — 3078F DIAST BP <80 MM HG: CPT | Performed by: NURSE PRACTITIONER

## 2023-09-05 PROCEDURE — 3044F HG A1C LEVEL LT 7.0%: CPT | Performed by: NURSE PRACTITIONER

## 2023-09-05 PROCEDURE — 1160F RVW MEDS BY RX/DR IN RCRD: CPT | Performed by: NURSE PRACTITIONER

## 2023-09-05 PROCEDURE — 3074F SYST BP LT 130 MM HG: CPT | Performed by: NURSE PRACTITIONER

## 2023-09-05 PROCEDURE — 1159F MED LIST DOCD IN RCRD: CPT | Performed by: NURSE PRACTITIONER

## 2023-09-05 PROCEDURE — 82043 UR ALBUMIN QUANTITATIVE: CPT | Performed by: NURSE PRACTITIONER

## 2023-09-05 PROCEDURE — 84443 ASSAY THYROID STIM HORMONE: CPT | Performed by: NURSE PRACTITIONER

## 2023-09-05 PROCEDURE — 90746 HEPB VACCINE 3 DOSE ADULT IM: CPT | Performed by: NURSE PRACTITIONER

## 2023-09-05 PROCEDURE — G0010 ADMIN HEPATITIS B VACCINE: HCPCS | Performed by: NURSE PRACTITIONER

## 2023-09-05 PROCEDURE — 80061 LIPID PANEL: CPT | Performed by: NURSE PRACTITIONER

## 2023-09-05 PROCEDURE — 83036 HEMOGLOBIN GLYCOSYLATED A1C: CPT | Performed by: NURSE PRACTITIONER

## 2023-09-05 PROCEDURE — 82306 VITAMIN D 25 HYDROXY: CPT | Performed by: NURSE PRACTITIONER

## 2023-09-05 PROCEDURE — 80053 COMPREHEN METABOLIC PANEL: CPT | Performed by: NURSE PRACTITIONER

## 2023-09-05 PROCEDURE — 85025 COMPLETE CBC W/AUTO DIFF WBC: CPT | Performed by: NURSE PRACTITIONER

## 2023-09-05 PROCEDURE — 82570 ASSAY OF URINE CREATININE: CPT | Performed by: NURSE PRACTITIONER

## 2023-09-05 RX ORDER — TIRZEPATIDE 5 MG/.5ML
5 INJECTION, SOLUTION SUBCUTANEOUS WEEKLY
Qty: 2 ML | Refills: 0 | Status: SHIPPED | OUTPATIENT
Start: 2023-09-05

## 2023-09-05 RX ORDER — TIZANIDINE HYDROCHLORIDE 4 MG/1
CAPSULE, GELATIN COATED ORAL
COMMUNITY

## 2023-09-05 RX ORDER — PREGABALIN 75 MG/1
75 CAPSULE ORAL 3 TIMES DAILY
Qty: 90 CAPSULE | Refills: 2 | Status: SHIPPED | OUTPATIENT
Start: 2023-09-05

## 2023-09-05 NOTE — PROGRESS NOTES
Chief Complaint  Follow-up (3 month bp meds )    Subjective          Janette Chadwick presents to [facility] [place] for follow up. She has noticed her blood pressure reading a little high at home.     Hypertension  This is a chronic problem. The current episode started more than 1 year ago. The problem is controlled. Associated symptoms include anxiety, headaches, malaise/fatigue and shortness of breath. There are no associated agents to hypertension. Risk factors for coronary artery disease include diabetes mellitus, dyslipidemia and obesity. Current antihypertension treatment includes beta blockers, alpha 1 blockers and diuretics. The current treatment provides moderate improvement. Hypertensive end-organ damage includes heart failure. Identifiable causes of hypertension include sleep apnea and a thyroid problem.   Anxiety  Presents for follow-up visit. Symptoms include depressed mood, irritability, nervous/anxious behavior and shortness of breath. Symptoms occur occasionally. The severity of symptoms is mild.       Hyperlipidemia  This is a chronic problem. The current episode started more than 1 year ago. Exacerbating diseases include diabetes, hypothyroidism and obesity. Factors aggravating her hyperlipidemia include beta blockers. Associated symptoms include shortness of breath. Current antihyperlipidemic treatment includes statins. Risk factors for coronary artery disease include diabetes mellitus, dyslipidemia, obesity and hypertension.   Diabetes  She presents for her follow-up diabetic visit. She has type 2 diabetes mellitus. The initial diagnosis of diabetes was made 0 years ago. Her disease course has been stable. Hypoglycemia symptoms include headaches and nervousness/anxiousness. Associated symptoms include fatigue. Symptoms are worsening. Risk factors for coronary artery disease include dyslipidemia, obesity and hypertension. Current diabetic  treatment includes oral agent (dual therapy). She is compliant with treatment most of the time. An ACE inhibitor/angiotensin II receptor blocker is being taken. She sees a podiatrist.Eye exam is current.   COPD  She complains of shortness of breath. This is a chronic problem. The current episode started more than 1 year ago. The problem occurs intermittently. The problem has been waxing and waning. Associated symptoms include dyspnea on exertion, headaches and malaise/fatigue. Her symptoms are aggravated by change in weather and any activity. Her symptoms are alleviated by steroid inhaler. She reports moderate improvement on treatment. Her past medical history is significant for COPD.   Outpatient Medications Prior to Visit   Medication Sig Dispense Refill    albuterol sulfate  (90 Base) MCG/ACT inhaler Inhale 2 puffs Every 4 (Four) Hours As Needed for Wheezing. 54 g 3    amitriptyline (ELAVIL) 100 MG tablet Take 1 tablet by mouth every night at bedtime. 90 tablet 3    aspirin 81 MG EC tablet Take 1 tablet by mouth Daily. 30 tablet 11    atorvastatin (LIPITOR) 20 MG tablet Take 1 tablet by mouth Daily. 90 tablet 3    calcium carbonate (OS-DIVYA) 600 MG tablet Take 1 tablet by mouth Daily.      carvedilol (COREG) 3.125 MG tablet Take 1 tablet by mouth 2 (Two) Times a Day With Meals. 180 tablet 3    cholecalciferol (VITAMIN D3) 25 MCG (1000 UT) tablet Take 2 tablets by mouth Daily. 180 tablet 3    Cobalamin Combinations (B-12) 100-5000 MCG sublingual tablet B12   1000 daily      cyclobenzaprine (FLEXERIL) 10 MG tablet Take 1 tablet by mouth 2 (Two) Times a Day As Needed for Muscle Spasms. 180 tablet 3    diazePAM (Valium) 10 MG tablet Take 1 tablet 30 min prior to MRI 1 tablet 0    docusate sodium 100 MG capsule Take 1 capsule by mouth Daily. 90 each 0    DULoxetine (CYMBALTA) 60 MG capsule Take 1 capsule by mouth Daily. 90 capsule 3    Emgality 120 MG/ML solution prefilled syringe Inject 1 mL under the skin into  the appropriate area as directed Every 30 (Thirty) Days. 3.36 mL 3    empagliflozin (Jardiance) 10 MG tablet tablet Take 1 tablet by mouth Daily. 90 tablet 3    esomeprazole (nexIUM) 20 MG capsule Take 1 capsule by mouth Every Morning Before Breakfast. 90 capsule 3    ferrous sulfate 325 (65 FE) MG tablet Take 1 tablet by mouth Daily With Breakfast. 90 tablet 3    Fetzima 40 MG capsule sustained-release 24 hr TAKE 1 CAPSULE BY MOUTH EVERY DAY 30 capsule 3    fluticasone (FLONASE) 50 MCG/ACT nasal spray 2 sprays into the nostril(s) as directed by provider Daily. 16 g 3    FREESTYLE LITE test strip USE ONE STRIP TO CHECK BLOOD GLUCOSE TWICE DAILY      furosemide (LASIX) 40 MG tablet TAKE 1 TABLET BY MOUTH EVERY DAY 30 tablet 7    glucose blood (IGlucose Test Strips) test strip Use as instructed 50 each 11    hydrOXYzine (ATARAX) 25 MG tablet TAKE 1 TABLET EVERY DAY AS NEEDED FOR ANXIETY 30 tablet 0    Incontinence Supply Disposable (SupplyBetterS health Incontinence Pads) misc 4 each Daily. 150 each 11    Incontinence Supply Disposable (Underpads) misc 3 each Daily. 100 each 11    levothyroxine (SYNTHROID, LEVOTHROID) 175 MCG tablet Take 1 tablet by mouth Daily. 90 tablet 3    Linzess 72 MCG capsule capsule TAKE 1 CAPSULE BY MOUTH EVERY DAY 30 capsule 5    lisinopril-hydrochlorothiazide (PRINZIDE,ZESTORETIC) 10-12.5 MG per tablet Take 1 tablet by mouth Daily. 90 tablet 3    loratadine (CLARITIN) 10 MG tablet Take 1 tablet by mouth Daily. 90 tablet 3    magnesium gluconate (MAGONATE) 500 MG tablet magnesium 500 mg tablet   Take by oral route.      metFORMIN (GLUCOPHAGE) 1000 MG tablet TAKE 1 TABLET BY MOUTH TWICE DAILY WITH MEALS 60 tablet 3    nystatin (MYCOSTATIN) 402351 UNIT/GM cream APPLY ONE APPLICATION TOPICALLY TO  INCISION SITE TWICE DAILY FOR 30 DAYS      pioglitazone (ACTOS) 15 MG tablet Take 1 tablet by mouth Daily. 90 tablet 3    potassium chloride 10 MEQ CR tablet TAKE 1 TABLET BY MOUTH EVERY DAY 30 tablet  "1    prochlorperazine (COMPAZINE) 10 MG tablet Take 1 tablet by mouth.      Symbicort 80-4.5 MCG/ACT inhaler Inhale 2 puffs As Needed.      TiZANidine (ZANAFLEX) 4 MG capsule Take 1 capsule every 6 hours by oral route.      TRUEplus Lancets 28G misc Use up to 4 times daily to check blood sugar 100 each 11    vitamin C (ASCORBIC ACID) 500 MG tablet TAKE 2 TABLETS EVERY DAY 60 tablet 1    pregabalin (LYRICA) 75 MG capsule TAKE 1 CAPSULE THREE TIMES DAILY 90 capsule 0    Semaglutide, 2 MG/DOSE, (Ozempic, 2 MG/DOSE,) 8 MG/3ML solution pen-injector Inject 2 mg under the skin into the appropriate area as directed 1 (One) Time Per Week. 3 mL 11     No facility-administered medications prior to visit.       Review of Systems   Constitutional:  Positive for fatigue, irritability and malaise/fatigue.   Respiratory:  Positive for shortness of breath.    Cardiovascular:  Positive for dyspnea on exertion.   Neurological:  Positive for headaches.   Psychiatric/Behavioral:  The patient is nervous/anxious.        Objective   Vital Signs:   Visit Vitals  BP 98/76 (BP Location: Left arm, Patient Position: Sitting, Cuff Size: Large Adult)   Pulse 100   Ht 72 cm (28.35\")   Wt (!) 159 kg (350 lb)   SpO2 98%   .26 kg/m²     Physical Exam  Vitals and nursing note reviewed. Chaperone present: in wheelchair.   Constitutional:       Appearance: She is well-developed.   HENT:      Head: Normocephalic and atraumatic.   Eyes:      General: Lids are normal.      Conjunctiva/sclera: Conjunctivae normal.   Neck:      Thyroid: No thyroid mass or thyromegaly.      Trachea: Trachea normal. No tracheal tenderness.   Cardiovascular:      Rate and Rhythm: Normal rate.      Pulses: Normal pulses.      Heart sounds: Normal heart sounds.   Pulmonary:      Effort: Pulmonary effort is normal. No respiratory distress.      Breath sounds: Normal breath sounds. No wheezing.   Abdominal:      General: There is no distension.      Palpations: Abdomen is " soft. There is no mass.   Musculoskeletal:      Right shoulder: Decreased range of motion.      Cervical back: Normal range of motion. No edema.   Skin:     General: Skin is warm and dry.      Coloration: Skin is not pale.      Findings: No abrasion, erythema or lesion.   Neurological:      Mental Status: She is alert and oriented to person, place, and time.   Psychiatric:         Mood and Affect: Mood is not anxious. Affect is not inappropriate.         Speech: Speech normal.         Behavior: Behavior normal.         Thought Content: Thought content normal.         Judgment: Judgment normal. Judgment is not impulsive.      Result Review :                 Assessment and Plan    Diagnoses and all orders for this visit:    1. DM type 2 with diabetic peripheral neuropathy (Primary)  -     pregabalin (LYRICA) 75 MG capsule; Take 1 capsule by mouth 3 (Three) Times a Day.  Dispense: 90 capsule; Refill: 2  -     Tirzepatide (Mounjaro) 5 MG/0.5ML solution pen-injector; Inject 0.5 mL under the skin into the appropriate area as directed 1 (One) Time Per Week.  Dispense: 2 mL; Refill: 0    2. Essential hypertension    3. Mixed hyperlipidemia    4. Congestive heart failure, unspecified HF chronicity, unspecified heart failure type      Complete ordered lab work   We will call with results  Pending lab results we will discuss further treatment plan and monitoring     The current medical regimen is effective;  continue present plan and medications.      Continue to monitor blood pressure at home, discussed with patient that sometimes automatic cuffs tend to read a little higher than manual    Continue to follow up with ortho regarding shoulder, she is getting set up for open MRI             Follow Up   Return in about 3 months (around 12/5/2023), or if symptoms worsen or fail to improve, for Next scheduled follow up.  Patient was given instructions and counseling regarding her condition or for health maintenance advice. Please  see specific information pulled into the AVS if appropriate.           This document has been electronically signed by INES Azevedo on September 5, 2023 09:02 CDT  Answers submitted by the patient for this visit:  Other (Submitted on 8/29/2023)  Please describe your symptoms.: Follow up and labs,, shots  Have you had these symptoms before?: Yes  How long have you been having these symptoms?: Greater than 2 weeks  Please list any medications you are currently taking for this condition.: You have that list  Please describe any probable cause for these symptoms. : Blood pressure running a little high  Primary Reason for Visit (Submitted on 8/29/2023)  What is the primary reason for your visit?: Other

## 2023-09-06 ENCOUNTER — TELEPHONE (OUTPATIENT)
Dept: ENDOCRINOLOGY | Facility: CLINIC | Age: 58
End: 2023-09-06
Payer: MEDICARE

## 2023-09-06 NOTE — TELEPHONE ENCOUNTER
----- Message from INES Marino sent at 9/6/2023  7:58 AM CDT -----  Please let her know her A1c was 5.9%, vitamin D was low at 22 for sure she is taking vitamin D replacement, thyroid is mildly overactive I have that she takes 175 mcg daily if this is correct continue 1 daily-every Sunday take half of a tablet

## 2023-09-14 RX ORDER — DIAZEPAM 10 MG/1
TABLET ORAL
Qty: 1 TABLET | Refills: 0 | Status: SHIPPED | OUTPATIENT
Start: 2023-09-14

## 2023-09-14 NOTE — TELEPHONE ENCOUNTER
[place]    Patient called and stated that she is having her open MRI in [place] on 9/27/23 and asked if you could send her in a valium to [facility] in [place].

## 2023-09-18 DIAGNOSIS — K21.9 GASTRO-ESOPHAGEAL REFLUX DISEASE WITHOUT ESOPHAGITIS: ICD-10-CM

## 2023-09-18 DIAGNOSIS — E78.2 MIXED HYPERLIPIDEMIA: ICD-10-CM

## 2023-09-18 DIAGNOSIS — E03.9 HYPOTHYROIDISM, UNSPECIFIED TYPE: ICD-10-CM

## 2023-09-18 DIAGNOSIS — E11.8 DM (DIABETES MELLITUS), TYPE 2 WITH COMPLICATIONS: ICD-10-CM

## 2023-09-18 RX ORDER — POTASSIUM CHLORIDE 750 MG/1
TABLET, FILM COATED, EXTENDED RELEASE ORAL
Qty: 30 TABLET | Refills: 0 | Status: SHIPPED | OUTPATIENT
Start: 2023-09-18

## 2023-09-18 RX ORDER — LINACLOTIDE 72 UG/1
72 CAPSULE, GELATIN COATED ORAL DAILY
Qty: 90 CAPSULE | Refills: 1 | Status: SHIPPED | OUTPATIENT
Start: 2023-09-18

## 2023-09-18 NOTE — TELEPHONE ENCOUNTER
Incoming Refill Request      Medication requested (name and dose):     Pharmacy where request should be sent:     Additional details provided by patient:     Best call back number:     Does the patient have less than a 3 day supply:  [] Yes  [] No    Socorro Lopez MA  09/18/23, 11:42 CDT

## 2023-09-29 RX ORDER — DILTIAZEM HYDROCHLORIDE 60 MG/1
TABLET, FILM COATED ORAL
Qty: 10.2 G | Refills: 0 | Status: SHIPPED | OUTPATIENT
Start: 2023-09-29

## 2023-11-26 NOTE — THERAPY TREATMENT NOTE
Outpatient Physical Therapy Ortho Treatment Note  [facility]     Patient Name: Janette Chadwick  : 1965  MRN: [id number]  Today's Date: 11/3/2022      Visit Date: 2022  Subjective Improvement: 45%  MD visit: MAKAYLA  Visit Number: 10/10  Total Approved:12  Recert Date: 11/10/2022  Visit Dx:    ICD-10-CM ICD-9-CM   1. Status post reverse total replacement of right shoulder  Z96.611 V43.61   2. Right shoulder pain, unspecified chronicity  M25.511 719.41       Patient Active Problem List   Diagnosis   • Chest pain   • Diabetes mellitus ([facility])   • Mixed hyperlipidemia   • Essential hypertension   • Chronic obstructive pulmonary disease ([facility])   • Refractory migraine without aura   • Chronic migraine   • Morbid obesity with BMI of 50.0-59.9, adult ([facility])   • Pain in right upper arm   • Hypothyroidism   • Abnormal cardiovascular stress test   • Congestive heart failure ([facility])   • Constipation   • Diabetic neuropathy ([facility])   • Diastolic dysfunction   • Dyspnea on exertion   • Gastro-esophageal reflux disease without esophagitis   • Headache   • Hearing loss   • Hyperglycemia due to type 2 diabetes mellitus ([facility])   • Insomnia   • Mixed anxiety and depressive disorder   • Never smoked tobacco   • Obstructive sleep apnea syndrome   • Osteoarthritis of knee   • History of sleep apnea   • Severe obesity ([facility])   • Polyarthritis   • Type 2 diabetes mellitus without complication ([facility])   • Vitamin D deficiency   • PVD (peripheral vascular disease) with claudication ([facility])   • Localized swelling of both lower legs        Past Medical History:   Diagnosis Date   • Arthritis    • Asthma    • Carpal tunnel syndrome    • CHF (congestive heart failure) ([facility])    • COPD (chronic obstructive pulmonary disease) ([facility])    • Diabetes mellitus ([facility])    • Disease of thyroid gland    • Elevated cholesterol    • GERD (gastroesophageal reflux disease)    • Hyperlipidemia    • Hypertension    • Sleep apnea         Past Surgical  History:   Procedure Laterality Date   • CHOLECYSTECTOMY     • CHOLECYSTECTOMY     • FRACTURE SURGERY     • HYSTERECTOMY     • JOINT REPLACEMENT     • KIDNEY STONE SURGERY     • TIBIA FRACTURE SURGERY     • TUBAL ABDOMINAL LIGATION          PT Ortho     Row Name 11/03/22 1100       Subjective Comments    Subjective Comments pt reported that she has been hurting secondary to weather the other day when it was raining.  Therapist reports that we can start strengthening.  -TL       Precautions and Contraindications    Precautions R RTSA  -TL       Subjective Pain    Able to rate subjective pain? yes  -TL    Pre-Treatment Pain Level 3  -TL    Post-Treatment Pain Level 0  -TL          User Key  (r) = Recorded By, (t) = Taken By, (c) = Cosigned By    Initials Name Provider Type    Mer Truong PTA Physical Therapist Assistant                             PT Assessment/Plan     Row Name 11/03/22 1100          PT Assessment    Assessment Comments pt only has 2 visits left. Pt will need to try to get more visits. Pt tolerated strengthening ex this date. Pt given updated HEP. See educational tab. No new goals met at this time.  -TL        PT Plan    PT Frequency 2x/week  -TL     PT Plan Comments continue with strengthening ex.  -TL           User Key  (r) = Recorded By, (t) = Taken By, (c) = Cosigned By    Initials Name Provider Type    Mer Truong PTA Physical Therapist Assistant                 Modalities     Row Name 11/03/22 1100             Ice    Ice Applied Yes  -TL      Location right shoulder  -TL      Ice S/P Rx Yes  -TL         ELECTRICAL STIMULATION    Attended/Unattended Unattended  -TL      Stimulation Type IFC  -TL      Location/Electrode Placement/Other right shld  -TL      PT E-Stim Unattended Minutes 10  -TL            User Key  (r) = Recorded By, (t) = Taken By, (c) = Cosigned By    Initials Name Provider Type    Mer Truong PTA Physical Therapist Assistant               OP Exercises      Row Name 11/03/22 1100             Subjective Comments    Subjective Comments pt reported that she has been hurting secondary to weather the other day when it was raining.  Therapist reports that we can start strengthening.  -TL         Subjective Pain    Able to rate subjective pain? yes  -TL      Pre-Treatment Pain Level 3  -TL      Post-Treatment Pain Level 0  -TL         Exercise 1    Exercise Name 1 pro ll strengthening  -TL      Time 1 5 fwd/5 back  -TL      Additional Comments level 3.5  -TL         Exercise 2    Exercise Name 2 wall slides flex/abd  -TL      Reps 2 20 each direction  -TL         Exercise 3    Exercise Name 3 isometric shld ex  6 way  -TL      Reps 3 10 each direction  -TL      Time 3 10 sec hold  -TL         Exercise 4    Exercise Name 4 shld rows YTB mid/high  -TL      Sets 4 1  -TL      Reps 4 10 each direction  -TL         Exercise 5    Exercise Name 5 wall push up  -TL      Sets 5 1  -TL      Reps 5 10  -TL         Exercise 6    Exercise Name 6 semi-reclined wand #2 shld flexion  -TL      Sets 6 2  -TL      Reps 6 10  -TL            User Key  (r) = Recorded By, (t) = Taken By, (c) = Cosigned By    Initials Name Provider Type    TL Mer Michelle, PTA Physical Therapist Assistant                              PT OP Goals     Row Name 11/03/22 1100          PT Short Term Goals    STG Date to Achieve 11/03/22  -TL     STG 1 I with HEP and have additions/changes by next re-certification.  -TL     STG 1 Progress Ongoing;Progressing  -TL     STG 2 Patient to be more aware of posture and posture correction technique.  -TL     STG 2 Progress Ongoing  -TL     STG 3 Patient to have WFL AAROM with 3-way pulley's for improved flexibility.  -TL     STG 3 Progress Ongoing  -TL     STG 4 AROM R Shoulder flexion >= 110°  -TL     STG 4 Progress Ongoing  -TL     STG 5 AROM R Shoulder abduction >= 100°  -TL     STG 5 Progress Ongoing  -TL        Long Term Goals    LTG Date to Achieve 11/17/22  -TL      LTG 1 AROM R Shoulder flexion >= 125°  -TL     LTG 2 AROM R Shoulder abduction >= 115°  -TL     LTG 3 AROM R shoulder ER >= 45° at 80-90° of shoulder abduction.  -TL     LTG 4 AROM R shoulder IR >= 55° at 80-90° of shoulder abduction.  -TL     LTG 5 R UE >= 4/5 in available range.  -TL     LTG 6 Patient able to perform 2 minutes of OH activities with no increase in pain.  -TL     LTG 7 I with final HEP.  -TL        Time Calculation    PT Goal Re-Cert Due Date 11/10/22  -TL           User Key  (r) = Recorded By, (t) = Taken By, (c) = Cosigned By    Initials Name Provider Type    Mer Truong PTA Physical Therapist Assistant                Therapy Education  Education Details: shld row mid/high ytb, lat pull down YTB, wall push up, isometric shld ex 6-way, encourage pt to ice at home  Given: HEP, Symptoms/condition management, Pain management  Program: New, Reinforced  How Provided: Verbal, Demonstration, Written  Provided to: Patient  Level of Understanding: Teach back education performed, Verbalized, Demonstrated              Time Calculation:   Start Time: 1100  Stop Time: 1155  Time Calculation (min): 55 min  Untimed Charges  PT E-Stim Unattended Minutes: 10  Total Minutes  Untimed Charges Total Minutes: 10   Total Minutes: 10  Therapy Charges for Today     Code Description Service Date Service Provider Modifiers Qty    08332096601 HC PT ELECTRICAL STIM UNATTENDED 11/3/2022 Mer Michelle PTA CQ 1    99676790643 HC PT THER PROC EA 15 MIN 11/3/2022 Mer Michelle PTA GP, CQ 3                    Mer Michelle PTA  11/3/2022      Warm
